# Patient Record
Sex: FEMALE | Race: WHITE | NOT HISPANIC OR LATINO | Employment: OTHER | URBAN - METROPOLITAN AREA
[De-identification: names, ages, dates, MRNs, and addresses within clinical notes are randomized per-mention and may not be internally consistent; named-entity substitution may affect disease eponyms.]

---

## 2023-06-08 ENCOUNTER — OFFICE VISIT (OUTPATIENT)
Dept: PRIMARY CARE | Facility: CLINIC | Age: 73
End: 2023-06-08
Payer: MEDICARE

## 2023-06-08 VITALS
DIASTOLIC BLOOD PRESSURE: 64 MMHG | HEART RATE: 63 BPM | SYSTOLIC BLOOD PRESSURE: 118 MMHG | TEMPERATURE: 96.3 F | BODY MASS INDEX: 28.22 KG/M2 | WEIGHT: 175.6 LBS | OXYGEN SATURATION: 98 % | HEIGHT: 66 IN

## 2023-06-08 DIAGNOSIS — L43.9 LICHEN PLANUS: ICD-10-CM

## 2023-06-08 DIAGNOSIS — F51.01 PRIMARY INSOMNIA: ICD-10-CM

## 2023-06-08 DIAGNOSIS — Z00.00 ROUTINE GENERAL MEDICAL EXAMINATION AT HEALTH CARE FACILITY: Primary | ICD-10-CM

## 2023-06-08 DIAGNOSIS — R39.9 UTI SYMPTOMS: ICD-10-CM

## 2023-06-08 DIAGNOSIS — Z79.899 CONTROLLED SUBSTANCE AGREEMENT SIGNED: ICD-10-CM

## 2023-06-08 LAB
APPEARANCE UR: ABNORMAL
BILIRUB UR QL STRIP: NEGATIVE
COLOR UR: YELLOW
GLUCOSE UR STRIP-MCNC: NEGATIVE MG/DL
HGB UR QL STRIP: ABNORMAL
KETONES UR STRIP-MCNC: NEGATIVE MG/DL
LEUKOCYTE ESTERASE UR QL STRIP: ABNORMAL
NITRITE UR QL STRIP: NEGATIVE
PH UR STRIP: 6 [PH]
PROT UR STRIP-MCNC: ABNORMAL MG/DL
SP GR UR STRIP.AUTO: >=1.03
UROBILINOGEN UR STRIP-ACNC: 0.2 E.U./DL

## 2023-06-08 PROCEDURE — 80346 BENZODIAZEPINES1-12: CPT

## 2023-06-08 PROCEDURE — 80373 DRUG SCREENING TRAMADOL: CPT

## 2023-06-08 PROCEDURE — 99214 OFFICE O/P EST MOD 30 MIN: CPT | Performed by: STUDENT IN AN ORGANIZED HEALTH CARE EDUCATION/TRAINING PROGRAM

## 2023-06-08 PROCEDURE — 80361 OPIATES 1 OR MORE: CPT

## 2023-06-08 PROCEDURE — 80365 DRUG SCREENING OXYCODONE: CPT

## 2023-06-08 PROCEDURE — 80354 DRUG SCREENING FENTANYL: CPT

## 2023-06-08 PROCEDURE — 80368 SEDATIVE HYPNOTICS: CPT

## 2023-06-08 PROCEDURE — 81003 URINALYSIS AUTO W/O SCOPE: CPT | Performed by: STUDENT IN AN ORGANIZED HEALTH CARE EDUCATION/TRAINING PROGRAM

## 2023-06-08 PROCEDURE — 80307 DRUG TEST PRSMV CHEM ANLYZR: CPT

## 2023-06-08 PROCEDURE — 1036F TOBACCO NON-USER: CPT | Performed by: STUDENT IN AN ORGANIZED HEALTH CARE EDUCATION/TRAINING PROGRAM

## 2023-06-08 PROCEDURE — 87086 URINE CULTURE/COLONY COUNT: CPT

## 2023-06-08 PROCEDURE — 87186 SC STD MICRODIL/AGAR DIL: CPT

## 2023-06-08 PROCEDURE — 1159F MED LIST DOCD IN RCRD: CPT | Performed by: STUDENT IN AN ORGANIZED HEALTH CARE EDUCATION/TRAINING PROGRAM

## 2023-06-08 PROCEDURE — 80358 DRUG SCREENING METHADONE: CPT

## 2023-06-08 PROCEDURE — 87077 CULTURE AEROBIC IDENTIFY: CPT

## 2023-06-08 PROCEDURE — 1170F FXNL STATUS ASSESSED: CPT | Performed by: STUDENT IN AN ORGANIZED HEALTH CARE EDUCATION/TRAINING PROGRAM

## 2023-06-08 RX ORDER — LEVOTHYROXINE SODIUM 137 UG/1
137 TABLET ORAL
COMMUNITY
Start: 2021-05-21 | End: 2023-08-14

## 2023-06-08 RX ORDER — CALCIUM ACETATE 667 MG/1
1334 TABLET ORAL DAILY
COMMUNITY
End: 2023-10-11 | Stop reason: SDUPTHER

## 2023-06-08 RX ORDER — NITROFURANTOIN 25; 75 MG/1; MG/1
100 CAPSULE ORAL 2 TIMES DAILY
Qty: 10 CAPSULE | Refills: 0 | Status: SHIPPED | OUTPATIENT
Start: 2023-06-08 | End: 2023-06-19 | Stop reason: SDUPTHER

## 2023-06-08 RX ORDER — ZOLPIDEM TARTRATE 10 MG/1
1 TABLET ORAL NIGHTLY
COMMUNITY
Start: 2014-04-24 | End: 2023-08-07

## 2023-06-08 RX ORDER — ASPIRIN 81 MG/1
1 TABLET ORAL DAILY
COMMUNITY

## 2023-06-08 RX ORDER — ACETAMINOPHEN 500 MG
50 TABLET ORAL DAILY
COMMUNITY

## 2023-06-08 RX ORDER — NAPROXEN 500 MG/1
1 TABLET ORAL 2 TIMES DAILY PRN
COMMUNITY
Start: 2015-06-02 | End: 2023-10-11 | Stop reason: ALTCHOICE

## 2023-06-08 RX ORDER — UMECLIDINIUM 62.5 UG/1
1 AEROSOL, POWDER ORAL DAILY
COMMUNITY
Start: 2020-01-07 | End: 2023-08-14 | Stop reason: SDUPTHER

## 2023-06-08 RX ORDER — ELECTROLYTES/DEXTROSE
2 SOLUTION, ORAL ORAL DAILY
COMMUNITY
End: 2023-10-11 | Stop reason: DRUGHIGH

## 2023-06-08 RX ORDER — EPLERENONE 25 MG/1
1 TABLET, FILM COATED ORAL DAILY
COMMUNITY
Start: 2022-05-23 | End: 2024-02-03

## 2023-06-08 RX ORDER — BISACODYL 5 MG/1
1 TABLET, COATED ORAL DAILY
COMMUNITY
Start: 2018-05-25

## 2023-06-08 RX ORDER — HYDROCODONE BITARTRATE AND ACETAMINOPHEN 5; 325 MG/1; MG/1
1 TABLET ORAL EVERY 6 HOURS PRN
COMMUNITY
End: 2023-10-11 | Stop reason: SDUPTHER

## 2023-06-08 RX ORDER — CHLORHEXIDINE GLUCONATE ORAL RINSE 1.2 MG/ML
SOLUTION DENTAL
COMMUNITY
Start: 2022-11-29 | End: 2023-12-01 | Stop reason: ALTCHOICE

## 2023-06-08 RX ORDER — ZINC GLUCONATE 50 MG
50 TABLET ORAL DAILY
COMMUNITY

## 2023-06-08 RX ORDER — DEXAMETHASONE 0.5 MG/5ML
ELIXIR ORAL
COMMUNITY
Start: 2021-09-08

## 2023-06-08 RX ORDER — HYDROXYCHLOROQUINE SULFATE 200 MG/1
2 TABLET, FILM COATED ORAL DAILY
COMMUNITY

## 2023-06-08 RX ORDER — ALBUTEROL SULFATE 90 UG/1
2 AEROSOL, METERED RESPIRATORY (INHALATION) EVERY 4 HOURS PRN
COMMUNITY
Start: 2019-12-03

## 2023-06-08 RX ORDER — NORTRIPTYLINE HYDROCHLORIDE 25 MG/1
50 CAPSULE ORAL DAILY
COMMUNITY
End: 2023-08-14

## 2023-06-08 RX ORDER — LANSOPRAZOLE 30 MG/1
30 CAPSULE, DELAYED RELEASE ORAL
COMMUNITY
Start: 2017-11-03 | End: 2023-08-24

## 2023-06-08 RX ORDER — BIOTIN 10 MG
2 TABLET ORAL DAILY
COMMUNITY

## 2023-06-08 RX ORDER — ATORVASTATIN CALCIUM 20 MG/1
1 TABLET, FILM COATED ORAL DAILY
COMMUNITY
Start: 2016-08-17 | End: 2023-12-15

## 2023-06-08 ASSESSMENT — ACTIVITIES OF DAILY LIVING (ADL)
TAKING_MEDICATION: INDEPENDENT
DOING_HOUSEWORK: INDEPENDENT
DRESSING: INDEPENDENT
BATHING: INDEPENDENT
GROCERY_SHOPPING: INDEPENDENT
MANAGING_FINANCES: INDEPENDENT

## 2023-06-08 ASSESSMENT — PATIENT HEALTH QUESTIONNAIRE - PHQ9
2. FEELING DOWN, DEPRESSED OR HOPELESS: NOT AT ALL
1. LITTLE INTEREST OR PLEASURE IN DOING THINGS: NOT AT ALL
SUM OF ALL RESPONSES TO PHQ9 QUESTIONS 1 AND 2: 0

## 2023-06-08 ASSESSMENT — ENCOUNTER SYMPTOMS
OCCASIONAL FEELINGS OF UNSTEADINESS: 0
DEPRESSION: 0
LOSS OF SENSATION IN FEET: 0

## 2023-06-08 NOTE — PROGRESS NOTES
Subjective   Reason for Visit: Galina Chao is an 73 y.o. female here for a Medicare Wellness visit.          Reviewed all medications by prescribing practitioner or clinical pharmacist (such as prescriptions, OTCs, herbal therapies and supplements) and documented in the medical record.    HPI      Primary insomnia Long history of this, on Ambien doing well medical floor every 6 months.    UTI type symptoms with burning urination.  I will check a urine today.    COPD does follow with pulmonology doing well very mild case.  Patient Care Team:  Juvencio Gonzalez DO as PCP - General (Internal Medicine)  Blaine Moctezuma MD as PCP - [facility] Attributed Provider     Review of Systems   All other systems reviewed and are negative.  OARRS:  Juvencio Gonzalez DO on 6/8/2023 11:33 AM  I have personally reviewed the OARRS report for Galina Chao. I have considered the risks of abuse, dependence, addiction and diversion    Is the patient prescribed a combination of a benzodiazepine and opioid?  No    Last Urine Drug Screen / ordered today: Yes  Recent Results (from the past 95458 hour(s))   Drug Screen, Urine With Reflex to Confirmation    Collection Time: 11/08/22  2:51 PM   Result Value Ref Range    DRUG SCREEN COMMENT URINE SEE BELOW     Amphetamine Screen, Urine PRESUMPTIVE NEGATIVE NEGATIVE    Barbiturate Screen, Urine PRESUMPTIVE NEGATIVE NEGATIVE    BENZODIAZEPINE (PRESENCE) IN URINE BY SCREEN METHOD PRESUMPTIVE NEGATIVE NEGATIVE    Cannabinoid Screen, Urine PRESUMPTIVE NEGATIVE NEGATIVE    Cocaine Screen, Urine PRESUMPTIVE NEGATIVE NEGATIVE    Fentanyl, Ur PRESUMPTIVE NEGATIVE NEGATIVE    Methadone Screen, Urine PRESUMPTIVE NEGATIVE NEGATIVE    Opiate Screen, Urine PRESUMPTIVE NEGATIVE NEGATIVE    Oxycodone Screen, Ur PRESUMPTIVE NEGATIVE NEGATIVE    PCP Screen, Urine PRESUMPTIVE NEGATIVE NEGATIVE   Zolpidem Confirmation, Urine    Collection Time: 11/08/22  2:51 PM   Result Value Ref Range    Zolpidem 36 (A) Cutoff <25 ng/mL     "Zolpidem Metabolite (ZCA) >1000 (A) Cutoff <25 ng/mL     Results are as expected.     Controlled Substance Agreement:  Date of the Last Agreement: 6/8/2023  Reviewed Controlled Substance Agreement including but not limited to the benefits, risks, and alternatives to treatment with a Controlled Substance medication(s).    Sleep Aids:   What is the patient's goal of therapy? Sleep   Is this being achieved with current treatment? yes    Activities of Daily Living:   Is your overall impression that this patient is benefiting (symptom reduction outweighs side effects) from sleep aid therapy? Yes     1. Physical Functioning: Better  2. Family Relationship: Better  3. Social Relationship: Better  4. Mood: Better  5. Sleep Patterns: Better  6. Overall Function: Better      Objective   Vitals:  /64 (BP Location: Left arm, Patient Position: Sitting)   Pulse 63   Temp 35.7 °C (96.3 °F) (Temporal)   Ht 1.676 m (5' 6\")   Wt 79.7 kg (175 lb 9.6 oz)   SpO2 98%   BMI 28.34 kg/m²       Physical Exam  Constitutional:       Appearance: Normal appearance.   HENT:      Head: Normocephalic and atraumatic.      Right Ear: Tympanic membrane and ear canal normal.      Left Ear: Tympanic membrane and ear canal normal.      Mouth/Throat:      Mouth: Mucous membranes are moist.      Pharynx: Oropharynx is clear.   Eyes:      Extraocular Movements: Extraocular movements intact.      Conjunctiva/sclera: Conjunctivae normal.      Pupils: Pupils are equal, round, and reactive to light.   Cardiovascular:      Rate and Rhythm: Normal rate and regular rhythm.      Pulses: Normal pulses.      Heart sounds: Normal heart sounds.   Pulmonary:      Effort: Pulmonary effort is normal.      Breath sounds: Normal breath sounds.   Abdominal:      General: Abdomen is flat. Bowel sounds are normal.      Palpations: Abdomen is soft.   Musculoskeletal:         General: Normal range of motion.      Cervical back: Normal range of motion and neck supple. "   Skin:     General: Skin is warm and dry.      Capillary Refill: Capillary refill takes 2 to 3 seconds.   Neurological:      General: No focal deficit present.      Mental Status: She is alert and oriented to person, place, and time. Mental status is at baseline.   Psychiatric:         Mood and Affect: Mood normal.         Behavior: Behavior normal.         Thought Content: Thought content normal.         Judgment: Judgment normal.         Assessment/Plan   1. Routine general medical examination at health care facility  Recent labs were all reviewed with her.  - 1 Year Follow Up In Primary Care - Wellness Exam; Future    2. Controlled substance agreement signed    - Opiate/Opioid/Benzo Extended Prescription Compliance    3. UTI symptoms  Sent Macrobid and urine culture.  - POCT UA (Automated) docked device  - Urine Culture    4. Primary insomnia  OARRS reviewed UDS today CSA today.  For as needed.    5. Lichen planus  History of this.  Currently on Plaquenil steroids and pain meds as needed.

## 2023-06-10 LAB — URINE CULTURE: ABNORMAL

## 2023-06-12 ENCOUNTER — TELEPHONE (OUTPATIENT)
Dept: PRIMARY CARE | Facility: CLINIC | Age: 73
End: 2023-06-12
Payer: MEDICARE

## 2023-06-12 LAB
6-ACETYLMORPHINE: <25 NG/ML
7-AMINOCLONAZEPAM: <25 NG/ML
ALPHA-HYDROXYALPRAZOLAM: <25 NG/ML
ALPHA-HYDROXYMIDAZOLAM: <25 NG/ML
ALPRAZOLAM: <25 NG/ML
AMPHETAMINE (PRESENCE) IN URINE BY SCREEN METHOD: ABNORMAL
BARBITURATES PRESENCE IN URINE BY SCREEN METHOD: ABNORMAL
CANNABINOIDS IN URINE BY SCREEN METHOD: ABNORMAL
CHLORDIAZEPOXIDE: <25 NG/ML
CLONAZEPAM: <25 NG/ML
COCAINE (PRESENCE) IN URINE BY SCREEN METHOD: ABNORMAL
CODEINE: <50 NG/ML
CREATINE, URINE FOR DRUG: 114.1 MG/DL
DIAZEPAM: <25 NG/ML
DRUG SCREEN COMMENT URINE: ABNORMAL
EDDP: <25 NG/ML
FENTANYL CONFIRMATION, URINE: <2.5 NG/ML
HYDROCODONE: <25 NG/ML
HYDROMORPHONE: <25 NG/ML
LORAZEPAM: <25 NG/ML
METHADONE CONFIRMATION,URINE: <25 NG/ML
MIDAZOLAM: <25 NG/ML
MORPHINE URINE: <50 NG/ML
NORDIAZEPAM: <25 NG/ML
NORFENTANYL: <2.5 NG/ML
NORHYDROCODONE: <25 NG/ML
NOROXYCODONE: <25 NG/ML
O-DESMETHYLTRAMADOL: <50 NG/ML
OXAZEPAM: <25 NG/ML
OXYCODONE: <25 NG/ML
OXYMORPHONE: <25 NG/ML
PHENCYCLIDINE (PRESENCE) IN URINE BY SCREEN METHOD: ABNORMAL
TEMAZEPAM: <25 NG/ML
TRAMADOL: <50 NG/ML
ZOLPIDEM METABOLITE (ZCA): >1000 NG/ML
ZOLPIDEM: <25 NG/ML

## 2023-06-12 NOTE — TELEPHONE ENCOUNTER
----- Message from Juvencio Gonzalez DO sent at 6/12/2023  9:04 AM EDT -----  UC grew E. Coli, abx should take care of it

## 2023-06-19 DIAGNOSIS — R39.9 UTI SYMPTOMS: ICD-10-CM

## 2023-06-19 RX ORDER — NITROFURANTOIN 25; 75 MG/1; MG/1
100 CAPSULE ORAL 2 TIMES DAILY
Qty: 10 CAPSULE | Refills: 0 | Status: SHIPPED | OUTPATIENT
Start: 2023-06-19 | End: 2023-06-24

## 2023-08-04 DIAGNOSIS — I10 HYPERTENSION, UNSPECIFIED TYPE: Primary | ICD-10-CM

## 2023-08-04 DIAGNOSIS — F51.01 PRIMARY INSOMNIA: Primary | ICD-10-CM

## 2023-08-07 DIAGNOSIS — F51.01 PRIMARY INSOMNIA: ICD-10-CM

## 2023-08-07 RX ORDER — ZOLPIDEM TARTRATE 10 MG/1
TABLET ORAL NIGHTLY
Qty: 90 TABLET | Refills: 1 | Status: SHIPPED | OUTPATIENT
Start: 2023-08-07 | End: 2023-08-07 | Stop reason: SDUPTHER

## 2023-08-07 RX ORDER — ATENOLOL 25 MG/1
25 TABLET ORAL DAILY
Qty: 90 TABLET | Refills: 3 | Status: SHIPPED | OUTPATIENT
Start: 2023-08-07 | End: 2024-05-14 | Stop reason: SDUPTHER

## 2023-08-07 RX ORDER — ZOLPIDEM TARTRATE 10 MG/1
10 TABLET ORAL NIGHTLY
Qty: 90 TABLET | Refills: 1 | Status: SHIPPED | OUTPATIENT
Start: 2023-08-07 | End: 2023-10-11 | Stop reason: SDUPTHER

## 2023-08-07 NOTE — TELEPHONE ENCOUNTER
Patient called office and states needs a 90 refill of [facility]em [facility], states Dr. Moctezuma previously has always did this for patient. patient will be out of medication by 08/24/23.

## 2023-08-12 DIAGNOSIS — E03.9 HYPOTHYROIDISM, UNSPECIFIED TYPE: Primary | ICD-10-CM

## 2023-08-12 DIAGNOSIS — F32.A DEPRESSION, UNSPECIFIED DEPRESSION TYPE: ICD-10-CM

## 2023-08-14 DIAGNOSIS — J43.9 PULMONARY EMPHYSEMA, UNSPECIFIED EMPHYSEMA TYPE (MULTI): Primary | ICD-10-CM

## 2023-08-14 RX ORDER — UMECLIDINIUM 62.5 UG/1
1 AEROSOL, POWDER ORAL DAILY
Qty: 90 CAPSULE | Refills: 3 | Status: SHIPPED | OUTPATIENT
Start: 2023-08-14

## 2023-08-14 RX ORDER — LEVOTHYROXINE SODIUM 137 UG/1
137 TABLET ORAL
Qty: 90 TABLET | Refills: 3 | Status: SHIPPED | OUTPATIENT
Start: 2023-08-14 | End: 2023-08-15 | Stop reason: SDUPTHER

## 2023-08-14 RX ORDER — NORTRIPTYLINE HYDROCHLORIDE 25 MG/1
50 CAPSULE ORAL DAILY
Qty: 90 CAPSULE | Refills: 3 | Status: SHIPPED | OUTPATIENT
Start: 2023-08-14 | End: 2023-08-15 | Stop reason: SDUPTHER

## 2023-08-15 DIAGNOSIS — E03.9 HYPOTHYROIDISM, UNSPECIFIED TYPE: ICD-10-CM

## 2023-08-15 DIAGNOSIS — F32.A DEPRESSION, UNSPECIFIED DEPRESSION TYPE: ICD-10-CM

## 2023-08-16 PROBLEM — G89.28 CHRONIC NECK PAIN WITH HISTORY OF CERVICAL SPINAL SURGERY: Status: ACTIVE | Noted: 2023-08-16

## 2023-08-16 PROBLEM — I83.93 VARICOSE VEINS OF LEGS: Status: ACTIVE | Noted: 2023-08-16

## 2023-08-16 PROBLEM — G89.29 CHRONIC RIGHT-SIDED LOW BACK PAIN WITHOUT SCIATICA: Status: ACTIVE | Noted: 2023-08-16

## 2023-08-16 PROBLEM — R41.3 MEMORY CHANGE: Status: ACTIVE | Noted: 2023-08-16

## 2023-08-16 PROBLEM — R68.2 DRY MOUTH: Status: ACTIVE | Noted: 2023-08-16

## 2023-08-16 PROBLEM — M70.62 TROCHANTERIC BURSITIS OF BOTH HIPS: Status: ACTIVE | Noted: 2023-08-16

## 2023-08-16 PROBLEM — M79.2 NEURALGIA: Status: ACTIVE | Noted: 2023-08-16

## 2023-08-16 PROBLEM — G43.909 MIGRAINES: Status: ACTIVE | Noted: 2023-08-16

## 2023-08-16 PROBLEM — M25.561 BILATERAL KNEE PAIN: Status: ACTIVE | Noted: 2023-08-16

## 2023-08-16 PROBLEM — M35.00 SICCA SYNDROME (MULTI): Status: ACTIVE | Noted: 2023-08-16

## 2023-08-16 PROBLEM — M54.6 THORACIC BACK PAIN: Status: ACTIVE | Noted: 2023-08-16

## 2023-08-16 PROBLEM — R10.9 ABDOMINAL PAIN: Status: ACTIVE | Noted: 2023-08-16

## 2023-08-16 PROBLEM — E78.5 HYPERLIPIDEMIA: Status: ACTIVE | Noted: 2023-08-16

## 2023-08-16 PROBLEM — K12.1 ORAL ULCER: Status: ACTIVE | Noted: 2023-08-16

## 2023-08-16 PROBLEM — M48.02 DEGENERATIVE CERVICAL SPINAL STENOSIS: Status: ACTIVE | Noted: 2023-08-16

## 2023-08-16 PROBLEM — R42 VERTIGO: Status: ACTIVE | Noted: 2023-08-16

## 2023-08-16 PROBLEM — J31.0 CHRONIC RHINITIS: Status: ACTIVE | Noted: 2023-08-16

## 2023-08-16 PROBLEM — R07.89 CHEST TIGHTNESS: Status: ACTIVE | Noted: 2023-08-16

## 2023-08-16 PROBLEM — Z96.652 PRESENCE OF TOTAL LEFT KNEE JOINT PROSTHESIS: Status: ACTIVE | Noted: 2023-08-16

## 2023-08-16 PROBLEM — R22.1 NECK MASS: Status: ACTIVE | Noted: 2023-08-16

## 2023-08-16 PROBLEM — M25.562 BILATERAL KNEE PAIN: Status: ACTIVE | Noted: 2023-08-16

## 2023-08-16 PROBLEM — H81.10 BENIGN PAROXYSMAL POSITIONAL VERTIGO: Status: ACTIVE | Noted: 2023-08-16

## 2023-08-16 PROBLEM — R27.0 ATAXIA: Status: ACTIVE | Noted: 2023-08-16

## 2023-08-16 PROBLEM — M54.12 RIGHT CERVICAL RADICULOPATHY: Status: ACTIVE | Noted: 2023-08-16

## 2023-08-16 PROBLEM — M17.11 ARTHRITIS OF KNEE, RIGHT: Status: ACTIVE | Noted: 2023-08-16

## 2023-08-16 PROBLEM — M54.2 CHRONIC NECK PAIN WITH HISTORY OF CERVICAL SPINAL SURGERY: Status: ACTIVE | Noted: 2023-08-16

## 2023-08-16 PROBLEM — N90.4 LICHEN SCLEROSUS OF FEMALE GENITALIA: Status: ACTIVE | Noted: 2023-08-16

## 2023-08-16 PROBLEM — M81.0 MENOPAUSAL OSTEOPOROSIS: Status: ACTIVE | Noted: 2023-08-16

## 2023-08-16 PROBLEM — Z95.5 PRESENCE OF STENT IN CORONARY ARTERY: Status: ACTIVE | Noted: 2023-08-16

## 2023-08-16 PROBLEM — H90.3 BILATERAL HIGH FREQUENCY SENSORINEURAL HEARING LOSS: Status: ACTIVE | Noted: 2023-08-16

## 2023-08-16 PROBLEM — H04.129 DRY EYE: Status: ACTIVE | Noted: 2023-08-16

## 2023-08-16 PROBLEM — M54.50 CHRONIC RIGHT-SIDED LOW BACK PAIN WITHOUT SCIATICA: Status: ACTIVE | Noted: 2023-08-16

## 2023-08-16 PROBLEM — I25.10 CORONARY ARTERY DISEASE: Status: ACTIVE | Noted: 2023-08-16

## 2023-08-16 PROBLEM — Z98.890 CHRONIC NECK PAIN WITH HISTORY OF CERVICAL SPINAL SURGERY: Status: ACTIVE | Noted: 2023-08-16

## 2023-08-16 PROBLEM — G99.2 MYELOPATHY CONCURRENT WITH AND DUE TO SPINAL STENOSIS OF CERVICAL REGION (MULTI): Status: ACTIVE | Noted: 2023-08-16

## 2023-08-16 PROBLEM — M17.12 ARTHRITIS OF KNEE, LEFT: Status: ACTIVE | Noted: 2023-08-16

## 2023-08-16 PROBLEM — M70.61 TROCHANTERIC BURSITIS OF BOTH HIPS: Status: ACTIVE | Noted: 2023-08-16

## 2023-08-16 PROBLEM — M48.02 MYELOPATHY CONCURRENT WITH AND DUE TO SPINAL STENOSIS OF CERVICAL REGION (MULTI): Status: ACTIVE | Noted: 2023-08-16

## 2023-08-16 PROBLEM — G56.20 ULNAR NEUROPATHY: Status: ACTIVE | Noted: 2023-08-16

## 2023-08-16 RX ORDER — ATORVASTATIN CALCIUM 20 MG/1
20 TABLET, FILM COATED ORAL DAILY
COMMUNITY
Start: 2017-06-01 | End: 2023-10-11 | Stop reason: DRUGHIGH

## 2023-08-16 RX ORDER — NORTRIPTYLINE HYDROCHLORIDE 25 MG/1
50 CAPSULE ORAL DAILY
Qty: 180 CAPSULE | Refills: 3 | Status: SHIPPED | OUTPATIENT
Start: 2023-08-16 | End: 2024-06-10 | Stop reason: SDUPTHER

## 2023-08-16 RX ORDER — NAPROXEN SODIUM 500 MG/1
TABLET, FILM COATED, EXTENDED RELEASE ORAL
COMMUNITY
End: 2023-10-11 | Stop reason: SDUPTHER

## 2023-08-16 RX ORDER — HYDROCODONE BITARTRATE AND ACETAMINOPHEN 5; 325 MG/1; MG/1
1 TABLET ORAL 2 TIMES DAILY PRN
COMMUNITY
End: 2023-10-11 | Stop reason: SDUPTHER

## 2023-08-16 RX ORDER — AMLODIPINE BESYLATE 5 MG/1
TABLET ORAL
COMMUNITY
Start: 2017-06-01 | End: 2023-10-11 | Stop reason: CLARIF

## 2023-08-16 RX ORDER — DESONIDE 0.5 MG/G
OINTMENT TOPICAL 2 TIMES DAILY
COMMUNITY
Start: 2023-08-03 | End: 2023-10-11 | Stop reason: ALTCHOICE

## 2023-08-16 RX ORDER — BIOTIN 1 MG
TABLET ORAL
COMMUNITY
End: 2023-10-11 | Stop reason: DRUGHIGH

## 2023-08-16 RX ORDER — HYDROCODONE BITARTRATE AND ACETAMINOPHEN 5; 300 MG/1; MG/1
1 TABLET ORAL AS NEEDED
COMMUNITY
Start: 2020-06-29 | End: 2023-10-11 | Stop reason: CLARIF

## 2023-08-16 RX ORDER — CLOTRIMAZOLE AND BETAMETHASONE DIPROPIONATE 10; .64 MG/G; MG/G
1 CREAM TOPICAL 2 TIMES DAILY
COMMUNITY
Start: 2020-06-29 | End: 2023-10-11 | Stop reason: ALTCHOICE

## 2023-08-16 RX ORDER — PREDNISOLONE ACETATE 10 MG/ML
1 SUSPENSION/ DROPS OPHTHALMIC
COMMUNITY
Start: 2023-07-31 | End: 2023-10-11 | Stop reason: ALTCHOICE

## 2023-08-16 RX ORDER — LEVOTHYROXINE SODIUM 137 UG/1
1 TABLET ORAL DAILY
COMMUNITY
Start: 2021-05-21 | End: 2023-10-11 | Stop reason: SDUPTHER

## 2023-08-16 RX ORDER — LEVOTHYROXINE SODIUM 137 UG/1
TABLET ORAL
Qty: 90 TABLET | Refills: 3 | Status: SHIPPED | OUTPATIENT
Start: 2023-08-16 | End: 2024-01-23

## 2023-08-16 RX ORDER — LEVOTHYROXINE SODIUM 175 UG/1
TABLET ORAL
COMMUNITY
End: 2023-10-11 | Stop reason: DRUGHIGH

## 2023-08-16 RX ORDER — NORTRIPTYLINE HYDROCHLORIDE 25 MG/1
1 CAPSULE ORAL 3 TIMES DAILY
COMMUNITY
Start: 2019-03-01 | End: 2023-10-11 | Stop reason: DRUGHIGH

## 2023-08-16 RX ORDER — MINOXIDIL 2 %
SOLUTION, NON-ORAL TOPICAL NIGHTLY
COMMUNITY
End: 2024-06-10 | Stop reason: ALTCHOICE

## 2023-08-24 DIAGNOSIS — K21.9 GASTROESOPHAGEAL REFLUX DISEASE WITHOUT ESOPHAGITIS: Primary | ICD-10-CM

## 2023-08-24 PROBLEM — Z96.651 STATUS POST TOTAL RIGHT KNEE REPLACEMENT: Status: ACTIVE | Noted: 2023-08-24

## 2023-08-24 PROBLEM — M25.552 PAIN OF BOTH HIP JOINTS: Status: ACTIVE | Noted: 2023-08-24

## 2023-08-24 PROBLEM — M25.551 PAIN OF BOTH HIP JOINTS: Status: ACTIVE | Noted: 2023-08-24

## 2023-08-24 PROBLEM — M79.605 PAIN IN BOTH LOWER EXTREMITIES: Status: ACTIVE | Noted: 2023-08-24

## 2023-08-24 PROBLEM — M79.604 PAIN IN BOTH LOWER EXTREMITIES: Status: ACTIVE | Noted: 2023-08-24

## 2023-08-24 PROBLEM — M54.9 BACK PAIN WITH HISTORY OF SPINAL SURGERY: Status: ACTIVE | Noted: 2023-08-24

## 2023-08-24 PROBLEM — Z98.890 BACK PAIN WITH HISTORY OF SPINAL SURGERY: Status: ACTIVE | Noted: 2023-08-24

## 2023-08-24 RX ORDER — LANSOPRAZOLE 30 MG/1
30 CAPSULE, DELAYED RELEASE ORAL EVERY MORNING
Qty: 90 CAPSULE | Refills: 3 | Status: SHIPPED | OUTPATIENT
Start: 2023-08-24 | End: 2024-05-21

## 2023-08-24 RX ORDER — CLOBETASOL PROPIONATE 0.5 MG/G
CREAM TOPICAL
COMMUNITY
Start: 2023-08-23 | End: 2023-10-11 | Stop reason: ALTCHOICE

## 2023-09-14 ENCOUNTER — TELEPHONE (OUTPATIENT)
Dept: PRIMARY CARE | Facility: CLINIC | Age: 73
End: 2023-09-14
Payer: MEDICARE

## 2023-09-14 DIAGNOSIS — N39.0 URINARY TRACT INFECTION WITHOUT HEMATURIA, SITE UNSPECIFIED: ICD-10-CM

## 2023-09-14 DIAGNOSIS — J06.9 UPPER RESPIRATORY TRACT INFECTION, UNSPECIFIED TYPE: Primary | ICD-10-CM

## 2023-09-14 RX ORDER — AZITHROMYCIN 250 MG/1
TABLET, FILM COATED ORAL
Qty: 6 TABLET | Refills: 0 | Status: SHIPPED | OUTPATIENT
Start: 2023-09-14 | End: 2023-11-02

## 2023-09-14 RX ORDER — NITROFURANTOIN 25; 75 MG/1; MG/1
100 CAPSULE ORAL 2 TIMES DAILY
Qty: 10 CAPSULE | Refills: 0 | Status: SHIPPED | OUTPATIENT
Start: 2023-09-14 | End: 2023-09-19

## 2023-09-14 NOTE — TELEPHONE ENCOUNTER
Pt called saying she has a uti, asking for antibiotics to be sent to [facility] pharmacy.

## 2023-09-14 NOTE — PROGRESS NOTES
Subjective   Reason for Visit: Galina Caho is an 73 y.o. female here for a Medicare Wellness visit.               HPI    Patient Care Team:  Juvencio Gonzalez DO as PCP - General (Internal Medicine)  Blaine Moctezuma MD as PCP - [facility]     Review of Systems    Objective   Vitals:  There were no vitals taken for this visit.      Physical Exam    Assessment/Plan   Problem List Items Addressed This Visit    None

## 2023-09-21 ENCOUNTER — HOSPITAL ENCOUNTER (OUTPATIENT)
Dept: DATA CONVERSION | Facility: HOSPITAL | Age: 73
Discharge: HOME | End: 2023-09-21
Attending: ANESTHESIOLOGY | Admitting: ANESTHESIOLOGY
Payer: MEDICARE

## 2023-09-21 DIAGNOSIS — M47.812 SPONDYLOSIS WITHOUT MYELOPATHY OR RADICULOPATHY, CERVICAL REGION: ICD-10-CM

## 2023-09-21 DIAGNOSIS — Z53.9 PROCEDURE AND TREATMENT NOT CARRIED OUT, UNSPECIFIED REASON: ICD-10-CM

## 2023-10-04 ENCOUNTER — APPOINTMENT (OUTPATIENT)
Dept: CARDIOLOGY | Facility: CLINIC | Age: 73
End: 2023-10-04
Payer: MEDICARE

## 2023-10-05 ENCOUNTER — APPOINTMENT (OUTPATIENT)
Dept: PAIN MEDICINE | Facility: CLINIC | Age: 73
End: 2023-10-05
Payer: MEDICARE

## 2023-10-11 ENCOUNTER — OFFICE VISIT (OUTPATIENT)
Dept: PRIMARY CARE | Facility: CLINIC | Age: 73
End: 2023-10-11
Payer: MEDICARE

## 2023-10-11 VITALS
WEIGHT: 182.8 LBS | SYSTOLIC BLOOD PRESSURE: 132 MMHG | DIASTOLIC BLOOD PRESSURE: 80 MMHG | BODY MASS INDEX: 29.38 KG/M2 | HEART RATE: 70 BPM | HEIGHT: 66 IN | OXYGEN SATURATION: 99 %

## 2023-10-11 DIAGNOSIS — G89.29 CHRONIC BILATERAL LOW BACK PAIN WITHOUT SCIATICA: Primary | ICD-10-CM

## 2023-10-11 DIAGNOSIS — F51.01 PRIMARY INSOMNIA: ICD-10-CM

## 2023-10-11 DIAGNOSIS — M54.50 CHRONIC BILATERAL LOW BACK PAIN WITHOUT SCIATICA: Primary | ICD-10-CM

## 2023-10-11 PROCEDURE — 1159F MED LIST DOCD IN RCRD: CPT | Performed by: STUDENT IN AN ORGANIZED HEALTH CARE EDUCATION/TRAINING PROGRAM

## 2023-10-11 PROCEDURE — 3075F SYST BP GE 130 - 139MM HG: CPT | Performed by: STUDENT IN AN ORGANIZED HEALTH CARE EDUCATION/TRAINING PROGRAM

## 2023-10-11 PROCEDURE — 1036F TOBACCO NON-USER: CPT | Performed by: STUDENT IN AN ORGANIZED HEALTH CARE EDUCATION/TRAINING PROGRAM

## 2023-10-11 PROCEDURE — 3079F DIAST BP 80-89 MM HG: CPT | Performed by: STUDENT IN AN ORGANIZED HEALTH CARE EDUCATION/TRAINING PROGRAM

## 2023-10-11 PROCEDURE — 99214 OFFICE O/P EST MOD 30 MIN: CPT | Performed by: STUDENT IN AN ORGANIZED HEALTH CARE EDUCATION/TRAINING PROGRAM

## 2023-10-11 PROCEDURE — 1125F AMNT PAIN NOTED PAIN PRSNT: CPT | Performed by: STUDENT IN AN ORGANIZED HEALTH CARE EDUCATION/TRAINING PROGRAM

## 2023-10-11 RX ORDER — HYDROCODONE BITARTRATE AND ACETAMINOPHEN 5; 325 MG/1; MG/1
1 TABLET ORAL 2 TIMES DAILY
Qty: 30 TABLET | Refills: 0 | Status: SHIPPED | OUTPATIENT
Start: 2023-10-11 | End: 2023-10-26

## 2023-10-11 RX ORDER — ZOLPIDEM TARTRATE 10 MG/1
10 TABLET ORAL NIGHTLY
Qty: 90 TABLET | Refills: 1 | Status: SHIPPED | OUTPATIENT
Start: 2023-10-11 | End: 2024-06-06

## 2023-10-11 NOTE — PROGRESS NOTES
Subjective   Patient ID: Galina Chao is a 73 y.o. female who presents for Hypertension, Hyperlipidemia, and Insomnia.    HPI     Primary insomnia Long history of this, on Ambien doing well medical floor every 6 months.     Patient has no chronic back issues.  She does take some  patient has 1-2 refills a year just takes only as needed.  From her old doctor.  She has pain management as well for this.     COPD does follow with pulmonology doing well very mild case.     Review of Systems   All other systems reviewed and are negative.  OARRS:  Juvencio Gonzalez DO on 6/8/2023 11:33 AM  I have personally reviewed the OARRS report for Galina Chao. I have considered the risks of abuse, dependence, addiction and diversion     Is the patient prescribed a combination of a benzodiazepine and opioid?  No     Last Urine Drug Screen / ordered today: Yes  Recent Results         Recent Results (from the past 24321 hour(s))   Drug Screen, Urine With Reflex to Confirmation     Collection Time: 11/08/22  2:51 PM   Result Value Ref Range     DRUG SCREEN COMMENT URINE SEE BELOW       Amphetamine Screen, Urine PRESUMPTIVE NEGATIVE NEGATIVE     Barbiturate Screen, Urine PRESUMPTIVE NEGATIVE NEGATIVE     BENZODIAZEPINE (PRESENCE) IN URINE BY SCREEN METHOD PRESUMPTIVE NEGATIVE NEGATIVE     Cannabinoid Screen, Urine PRESUMPTIVE NEGATIVE NEGATIVE     Cocaine Screen, Urine PRESUMPTIVE NEGATIVE NEGATIVE     Fentanyl, Ur PRESUMPTIVE NEGATIVE NEGATIVE     Methadone Screen, Urine PRESUMPTIVE NEGATIVE NEGATIVE     Opiate Screen, Urine PRESUMPTIVE NEGATIVE NEGATIVE     Oxycodone Screen, Ur PRESUMPTIVE NEGATIVE NEGATIVE     PCP Screen, Urine PRESUMPTIVE NEGATIVE NEGATIVE   Zolpidem Confirmation, Urine     Collection Time: 11/08/22  2:51 PM   Result Value Ref Range     Zolpidem 36 (A) Cutoff <25 ng/mL     Zolpidem Metabolite (ZCA) >1000 (A) Cutoff <25 ng/mL         Results are as expected.      Controlled Substance Agreement:  Date of the Last  "Agreement: 6/8/2023  Reviewed Controlled Substance Agreement including but not limited to the benefits, risks, and alternatives to treatment with a Controlled Substance medication(s).     Sleep Aids:   What is the patient's goal of therapy? Sleep   Is this being achieved with current treatment? yes     Activities of Daily Living:   Is your overall impression that this patient is benefiting (symptom reduction outweighs side effects) from sleep aid therapy? Yes      1. Physical Functioning: Better  2. Family Relationship: Better  3. Social Relationship: Better  4. Mood: Better  5. Sleep Patterns: Better  6. Overall Function: Better    Review of Systems   All other systems reviewed and are negative.      Objective   /80 (BP Location: Right arm, Patient Position: Sitting)   Pulse 70   Ht 1.676 m (5' 6\")   Wt 82.9 kg (182 lb 12.8 oz)   SpO2 99%   BMI 29.50 kg/m²     Physical Exam  Constitutional:       Appearance: Normal appearance.   HENT:      Head: Normocephalic and atraumatic.      Right Ear: Tympanic membrane and ear canal normal.      Left Ear: Tympanic membrane and ear canal normal.      Mouth/Throat:      Mouth: Mucous membranes are moist.      Pharynx: Oropharynx is clear.   Eyes:      Extraocular Movements: Extraocular movements intact.      Conjunctiva/sclera: Conjunctivae normal.      Pupils: Pupils are equal, round, and reactive to light.   Cardiovascular:      Rate and Rhythm: Normal rate and regular rhythm.      Pulses: Normal pulses.      Heart sounds: Normal heart sounds.   Pulmonary:      Effort: Pulmonary effort is normal.      Breath sounds: Normal breath sounds.   Abdominal:      General: Abdomen is flat. Bowel sounds are normal.      Palpations: Abdomen is soft.   Musculoskeletal:         General: Normal range of motion.      Cervical back: Normal range of motion and neck supple.   Skin:     General: Skin is warm and dry.      Capillary Refill: Capillary refill takes 2 to 3 seconds. "   Neurological:      General: No focal deficit present.      Mental Status: She is alert and oriented to person, place, and time. Mental status is at baseline.   Psychiatric:         Mood and Affect: Mood normal.         Behavior: Behavior normal.         Thought Content: Thought content normal.         Judgment: Judgment normal.         Assessment/Plan   1. Primary insomnia    - zolpidem (Ambien) 10 mg tablet; Take 1 tablet (10 mg) by mouth once daily at bedtime.  Dispense: 90 tablet; Refill: 1    2. Chronic bilateral low back pain without sciatica  Give refill OARRS reviewed.  To take sparingly.  Or severe as well.  Takes Ambien stable this.  No-show this time.  - HYDROcodone-acetaminophen (Norco) 5-325 mg tablet; Take 1 tablet by mouth 2 times a day for 15 days.  Dispense: 30 tablet; Refill: 0

## 2023-10-23 ENCOUNTER — TELEPHONE (OUTPATIENT)
Dept: PAIN MEDICINE | Facility: CLINIC | Age: 73
End: 2023-10-23
Payer: MEDICARE

## 2023-10-23 NOTE — TELEPHONE ENCOUNTER
Patient called and stated they started using desonide cream, and wanted to know if that was ok, since he is having a procedure Tomorrow

## 2023-10-24 ENCOUNTER — HOSPITAL ENCOUNTER (OUTPATIENT)
Dept: PAIN MEDICINE | Facility: CLINIC | Age: 73
Discharge: HOME | End: 2023-10-24
Payer: MEDICARE

## 2023-10-24 ENCOUNTER — ANCILLARY PROCEDURE (OUTPATIENT)
Dept: RADIOLOGY | Facility: CLINIC | Age: 73
End: 2023-10-24
Payer: MEDICARE

## 2023-10-24 VITALS
BODY MASS INDEX: 29.49 KG/M2 | DIASTOLIC BLOOD PRESSURE: 77 MMHG | SYSTOLIC BLOOD PRESSURE: 157 MMHG | TEMPERATURE: 96.6 F | WEIGHT: 177 LBS | HEIGHT: 65 IN | RESPIRATION RATE: 16 BRPM | HEART RATE: 77 BPM | OXYGEN SATURATION: 96 %

## 2023-10-24 DIAGNOSIS — M47.812 CERVICAL SPONDYLOSIS: ICD-10-CM

## 2023-10-24 DIAGNOSIS — M47.817 FACET ARTHROPATHY, LUMBOSACRAL: ICD-10-CM

## 2023-10-24 DIAGNOSIS — M47.812 CERVICAL SPONDYLOSIS: Primary | ICD-10-CM

## 2023-10-24 PROCEDURE — 77003 FLUOROGUIDE FOR SPINE INJECT: CPT

## 2023-10-24 PROCEDURE — 2500000005 HC RX 250 GENERAL PHARMACY W/O HCPCS

## 2023-10-24 PROCEDURE — 64490 INJ PARAVERT F JNT C/T 1 LEV: CPT | Performed by: ANESTHESIOLOGY

## 2023-10-24 PROCEDURE — 7100000009 HC PHASE TWO TIME - INITIAL BASE CHARGE: Performed by: ANESTHESIOLOGY

## 2023-10-24 PROCEDURE — 7100000010 HC PHASE TWO TIME - EACH INCREMENTAL 1 MINUTE: Performed by: ANESTHESIOLOGY

## 2023-10-24 PROCEDURE — 2500000004 HC RX 250 GENERAL PHARMACY W/ HCPCS (ALT 636 FOR OP/ED)

## 2023-10-24 PROCEDURE — 64491 INJ PARAVERT F JNT C/T 2 LEV: CPT | Performed by: ANESTHESIOLOGY

## 2023-10-24 RX ORDER — TRIAMCINOLONE ACETONIDE 40 MG/ML
INJECTION, SUSPENSION INTRA-ARTICULAR; INTRAMUSCULAR
Status: COMPLETED
Start: 2023-10-24 | End: 2023-10-24

## 2023-10-24 RX ORDER — ROPIVACAINE HYDROCHLORIDE 5 MG/ML
INJECTION, SOLUTION EPIDURAL; INFILTRATION; PERINEURAL
Status: COMPLETED
Start: 2023-10-24 | End: 2023-10-24

## 2023-10-24 RX ORDER — SODIUM CHLORIDE 9 MG/ML
INJECTION, SOLUTION INTRAMUSCULAR; INTRAVENOUS; SUBCUTANEOUS
Status: DISCONTINUED
Start: 2023-10-24 | End: 2023-10-24 | Stop reason: WASHOUT

## 2023-10-24 RX ORDER — DEXAMETHASONE SODIUM PHOSPHATE 10 MG/ML
INJECTION INTRAMUSCULAR; INTRAVENOUS
Status: DISCONTINUED
Start: 2023-10-24 | End: 2023-10-24 | Stop reason: WASHOUT

## 2023-10-24 RX ORDER — LIDOCAINE HYDROCHLORIDE 5 MG/ML
INJECTION, SOLUTION INFILTRATION; INTRAVENOUS
Status: COMPLETED
Start: 2023-10-24 | End: 2023-10-24

## 2023-10-24 RX ADMIN — ROPIVACAINE HYDROCHLORIDE 100 MG: 5 INJECTION, SOLUTION EPIDURAL; INFILTRATION; PERINEURAL at 09:28

## 2023-10-24 RX ADMIN — TRIAMCINOLONE ACETONIDE 40 MG: 40 INJECTION, SUSPENSION INTRA-ARTICULAR; INTRAMUSCULAR at 09:29

## 2023-10-24 RX ADMIN — LIDOCAINE HYDROCHLORIDE 250 MG: 5 INJECTION, SOLUTION INFILTRATION at 09:27

## 2023-10-24 ASSESSMENT — PAIN DESCRIPTION - DESCRIPTORS: DESCRIPTORS: ACHING

## 2023-10-24 ASSESSMENT — COLUMBIA-SUICIDE SEVERITY RATING SCALE - C-SSRS
1. IN THE PAST MONTH, HAVE YOU WISHED YOU WERE DEAD OR WISHED YOU COULD GO TO SLEEP AND NOT WAKE UP?: NO
2. HAVE YOU ACTUALLY HAD ANY THOUGHTS OF KILLING YOURSELF?: NO
6. HAVE YOU EVER DONE ANYTHING, STARTED TO DO ANYTHING, OR PREPARED TO DO ANYTHING TO END YOUR LIFE?: NO

## 2023-10-24 ASSESSMENT — PAIN - FUNCTIONAL ASSESSMENT
PAIN_FUNCTIONAL_ASSESSMENT: 0-10
PAIN_FUNCTIONAL_ASSESSMENT: 0-10

## 2023-10-24 ASSESSMENT — PAIN SCALES - GENERAL
PAINLEVEL_OUTOF10: 7
PAINLEVEL_OUTOF10: 3

## 2023-10-24 NOTE — H&P
History Of Present Illness  Galina Chao is a 73 y.o. female presenting with neck pain.      Past Medical History  Past Medical History:   Diagnosis Date    Allergic     Arthritis     COPD (chronic obstructive pulmonary disease) (CMS/HCC)     Disease of thyroid gland     Diverticulosis of intestine, part unspecified, without perforation or abscess without bleeding     Diverticulosis    Emphysema of lung (CMS/HCC)     GERD (gastroesophageal reflux disease)     Heart murmur     Hypertension     Inflammatory bowel disease     Obesity, unspecified     Adult-onset obesity    Personal history of other diseases of the circulatory system     History of hypertension    Personal history of other diseases of the circulatory system 10/09/2020    History of hypertension    Personal history of other diseases of the circulatory system     History of cardiac murmur    Personal history of other diseases of the digestive system     History of gastroesophageal reflux (GERD)    Personal history of other diseases of the digestive system     History of irritable bowel syndrome    Personal history of other diseases of the musculoskeletal system and connective tissue     History of arthritis    Personal history of other diseases of the musculoskeletal system and connective tissue     History of arthritis    Personal history of other diseases of the musculoskeletal system and connective tissue     History of fibromyalgia    Personal history of other diseases of the respiratory system     History of chronic obstructive lung disease    Personal history of other diseases of the respiratory system     History of pulmonary emphysema    Personal history of other endocrine, nutritional and metabolic disease     History of thyroid disorder       Surgical History  Past Surgical History:   Procedure Laterality Date    APPENDECTOMY  04/27/2018    Appendectomy    BACK SURGERY  10/12/2017    Back Surgery    CARDIAC CATHETERIZATION      CARDIAC SURGERY   "10/12/2017    Heart Surgery    CHOLECYSTECTOMY  05/24/2016    Cholecystectomy    ENDOMETRIAL ABLATION      JOINT REPLACEMENT      OTHER SURGICAL HISTORY  06/15/2022    Knee replacement    OTHER SURGICAL HISTORY  06/15/2022    Endometrial ablation    OTHER SURGICAL HISTORY  04/27/2018    Previous Stent Placement    OTHER SURGICAL HISTORY  04/27/2018    Abdominal Surgery        Social History  She reports that she quit smoking about 27 years ago. Her smoking use included cigarettes. She started smoking about 60 years ago. She has a 60.00 pack-year smoking history. She has never used smokeless tobacco. She reports current alcohol use. She reports that she does not use drugs.    Family History  Family History   Problem Relation Name Age of Onset    Cancer Mother JEFRY GRANT     Hypertension Mother JEFRY GRANT     Cancer Father ISA ALMENDAREZDAMIAN ZHOU     Hypertension Father ISA GRANT      Leukemia Father ISA ALMENDAREZDAMIAN ZHOU     Hypertension Sister      Hyperlipidemia Sister      Kidney disease Brother ISA JUANITA ZHOU     Stroke Brother ISA JUANITA ZHOU     Other (ischemic heart disease) Other          Allergies  Ciprofloxacin, Indomethacin, Sulfa (sulfonamide antibiotics), and Nsaids (non-steroidal anti-inflammatory drug)    Review of Systems     Physical Exam     Last Recorded Vitals  Blood pressure (!) 189/82, pulse 81, temperature 35.9 °C (96.6 °F), resp. rate 18, height 1.651 m (5' 5\"), weight 80.3 kg (177 lb), SpO2 96 %.       Assessment/Plan     Cervical spondylosis     Cervical medial branch nerve blocks bilateral at C4/5 and C5/6 with fluoro guidance.     Iban Ashley MD    "

## 2023-10-24 NOTE — OP NOTE
Date of Procedure: 10-     PRE-PROCEDURE DIAGNOSIS: Cervical Spondylosis    POST-PROCEDURE DIAGNOSIS: Cervical Spondylosis    PROCEDURE:    1. Bilateral Cervical medial branch blocks at C4/5 and C5/6    2. Under Fluoroscopic Guidance    ANESTHESIA: Local    COMPLICATIONS: None    CLINICAL DATA: The patient is a 72yo F presenting with a history of pain in her neck.     PROCEDURE: The patient was identified in the preop area. After risks and benefits were explained, informed consent was obtained. The patient was brought back to the operating room and placed in the prone position. Standard ASA monitors were applied and monitored throughout the procedure. The vitals signs were stable throughout.    The patient's cervical spine was prepped and draped in usual sterile fashion. Using fluoroscopic guidance the skin overlying the trajectory to the bilateral C4/5 and C5/6 facets was anesthetized with a total of 4 ml of 0.5% Lidocaine. Thereafter, 3 - 2 inch long 22G Quincke needles were advanced under fluoroscopic guidance initially in the AP view and then confirmed in the lateral view to their appropriate anatomical targets. Next, a total of 4 mL of 0.5% Ropivicaine and 40 mg of Kenalog was injected in equal and divided doses amongst all 4 sites. The needles were removed and a sterile dressing was applied. The patient tolerated the procedure well and the patient was transported to recovery in good condition.

## 2023-11-01 DIAGNOSIS — J06.9 UPPER RESPIRATORY TRACT INFECTION, UNSPECIFIED TYPE: ICD-10-CM

## 2023-11-02 RX ORDER — AZITHROMYCIN 250 MG/1
TABLET, FILM COATED ORAL
Qty: 6 TABLET | Refills: 72 | Status: SHIPPED | OUTPATIENT
Start: 2023-11-02 | End: 2023-12-01 | Stop reason: ALTCHOICE

## 2023-11-17 ENCOUNTER — LAB (OUTPATIENT)
Dept: LAB | Facility: LAB | Age: 73
End: 2023-11-17
Payer: MEDICARE

## 2023-11-17 ENCOUNTER — CONSULT (OUTPATIENT)
Dept: ALLERGY | Facility: CLINIC | Age: 73
End: 2023-11-17
Payer: MEDICARE

## 2023-11-17 ENCOUNTER — TELEPHONE (OUTPATIENT)
Dept: PAIN MEDICINE | Facility: CLINIC | Age: 73
End: 2023-11-17

## 2023-11-17 VITALS
WEIGHT: 175 LBS | HEART RATE: 74 BPM | OXYGEN SATURATION: 95 % | DIASTOLIC BLOOD PRESSURE: 78 MMHG | TEMPERATURE: 98.3 F | SYSTOLIC BLOOD PRESSURE: 130 MMHG | RESPIRATION RATE: 18 BRPM | BODY MASS INDEX: 29.12 KG/M2

## 2023-11-17 DIAGNOSIS — T78.3XXA ANGIOEDEMA, INITIAL ENCOUNTER: ICD-10-CM

## 2023-11-17 DIAGNOSIS — E03.9 HYPOTHYROIDISM, UNSPECIFIED TYPE: ICD-10-CM

## 2023-11-17 DIAGNOSIS — R23.2 FLUSHING: Primary | ICD-10-CM

## 2023-11-17 DIAGNOSIS — R23.2 FLUSHING: ICD-10-CM

## 2023-11-17 DIAGNOSIS — R22.0 FACIAL SWELLING: ICD-10-CM

## 2023-11-17 DIAGNOSIS — L50.9 HIVES: ICD-10-CM

## 2023-11-17 DIAGNOSIS — L43.9 LICHEN PLANUS: ICD-10-CM

## 2023-11-17 DIAGNOSIS — J31.0 CHRONIC RHINITIS: ICD-10-CM

## 2023-11-17 DIAGNOSIS — M54.12 RIGHT CERVICAL RADICULOPATHY: Primary | ICD-10-CM

## 2023-11-17 LAB
ALBUMIN SERPL BCP-MCNC: 4.3 G/DL (ref 3.4–5)
ALP SERPL-CCNC: 77 U/L (ref 33–136)
ALT SERPL W P-5'-P-CCNC: 13 U/L (ref 7–45)
ANION GAP SERPL CALC-SCNC: 13 MMOL/L (ref 10–20)
AST SERPL W P-5'-P-CCNC: 21 U/L (ref 9–39)
BASOPHILS # BLD AUTO: 0.06 X10*3/UL (ref 0–0.1)
BASOPHILS NFR BLD AUTO: 0.9 %
BILIRUB SERPL-MCNC: 0.7 MG/DL (ref 0–1.2)
BUN SERPL-MCNC: 13 MG/DL (ref 6–23)
C4 SERPL-MCNC: 47 MG/DL (ref 10–50)
CALCIUM SERPL-MCNC: 9.7 MG/DL (ref 8.6–10.6)
CHLORIDE SERPL-SCNC: 102 MMOL/L (ref 98–107)
CO2 SERPL-SCNC: 29 MMOL/L (ref 21–32)
CREAT SERPL-MCNC: 0.8 MG/DL (ref 0.5–1.05)
CRP SERPL-MCNC: 0.37 MG/DL
EOSINOPHIL # BLD AUTO: 0.48 X10*3/UL (ref 0–0.4)
EOSINOPHIL NFR BLD AUTO: 7 %
ERYTHROCYTE [DISTWIDTH] IN BLOOD BY AUTOMATED COUNT: 13.8 % (ref 11.5–14.5)
ERYTHROCYTE [SEDIMENTATION RATE] IN BLOOD BY WESTERGREN METHOD: 13 MM/H (ref 0–30)
GFR SERPL CREATININE-BSD FRML MDRD: 78 ML/MIN/1.73M*2
GLUCOSE SERPL-MCNC: 77 MG/DL (ref 74–99)
HCT VFR BLD AUTO: 40.2 % (ref 36–46)
HGB BLD-MCNC: 12.7 G/DL (ref 12–16)
IMM GRANULOCYTES # BLD AUTO: 0.01 X10*3/UL (ref 0–0.5)
IMM GRANULOCYTES NFR BLD AUTO: 0.1 % (ref 0–0.9)
LYMPHOCYTES # BLD AUTO: 1.39 X10*3/UL (ref 0.8–3)
LYMPHOCYTES NFR BLD AUTO: 20.4 %
MCH RBC QN AUTO: 29.6 PG (ref 26–34)
MCHC RBC AUTO-ENTMCNC: 31.6 G/DL (ref 32–36)
MCV RBC AUTO: 94 FL (ref 80–100)
MONOCYTES # BLD AUTO: 0.74 X10*3/UL (ref 0.05–0.8)
MONOCYTES NFR BLD AUTO: 10.8 %
NEUTROPHILS # BLD AUTO: 4.15 X10*3/UL (ref 1.6–5.5)
NEUTROPHILS NFR BLD AUTO: 60.8 %
NRBC BLD-RTO: 0 /100 WBCS (ref 0–0)
PLATELET # BLD AUTO: 263 X10*3/UL (ref 150–450)
POTASSIUM SERPL-SCNC: 4.2 MMOL/L (ref 3.5–5.3)
PROT SERPL-MCNC: 6.3 G/DL (ref 6.4–8.2)
RBC # BLD AUTO: 4.29 X10*6/UL (ref 4–5.2)
SODIUM SERPL-SCNC: 140 MMOL/L (ref 136–145)
THYROPEROXIDASE AB SERPL-ACNC: <28 IU/ML
WBC # BLD AUTO: 6.8 X10*3/UL (ref 4.4–11.3)

## 2023-11-17 PROCEDURE — 86376 MICROSOMAL ANTIBODY EACH: CPT

## 2023-11-17 PROCEDURE — 36415 COLL VENOUS BLD VENIPUNCTURE: CPT

## 2023-11-17 PROCEDURE — 86160 COMPLEMENT ANTIGEN: CPT

## 2023-11-17 PROCEDURE — 80053 COMPREHEN METABOLIC PANEL: CPT

## 2023-11-17 PROCEDURE — 86038 ANTINUCLEAR ANTIBODIES: CPT

## 2023-11-17 PROCEDURE — 85025 COMPLETE CBC W/AUTO DIFF WBC: CPT

## 2023-11-17 PROCEDURE — 83835 ASSAY OF METANEPHRINES: CPT

## 2023-11-17 PROCEDURE — 86161 COMPLEMENT/FUNCTION ACTIVITY: CPT

## 2023-11-17 PROCEDURE — 99204 OFFICE O/P NEW MOD 45 MIN: CPT | Performed by: ALLERGY & IMMUNOLOGY

## 2023-11-17 PROCEDURE — 82308 ASSAY OF CALCITONIN: CPT

## 2023-11-17 PROCEDURE — 86140 C-REACTIVE PROTEIN: CPT

## 2023-11-17 PROCEDURE — 84586 ASSAY OF VIP: CPT

## 2023-11-17 PROCEDURE — 85652 RBC SED RATE AUTOMATED: CPT

## 2023-11-17 PROCEDURE — 83520 IMMUNOASSAY QUANT NOS NONAB: CPT

## 2023-11-17 ASSESSMENT — ENCOUNTER SYMPTOMS
HEMATOLOGIC/LYMPHATIC NEGATIVE: 1
PSYCHIATRIC NEGATIVE: 1
MUSCULOSKELETAL NEGATIVE: 1
RESPIRATORY NEGATIVE: 1
NEUROLOGICAL NEGATIVE: 1
EYES NEGATIVE: 1
GASTROINTESTINAL NEGATIVE: 1
ENDOCRINE NEGATIVE: 1
CONSTITUTIONAL NEGATIVE: 1
CARDIOVASCULAR NEGATIVE: 1

## 2023-11-17 NOTE — TELEPHONE ENCOUNTER
She would like a 90 day supply of Gabapentin with 3 refills please. Things are going well on it.  [facility].

## 2023-11-17 NOTE — PATIENT INSTRUCTIONS
Try taking nightly antihistamine such as allegra or xyzal  I will call with results of labs when they are all back.  Plan a follow up visit in 6 months when you are back from [place]

## 2023-11-17 NOTE — PROGRESS NOTES
Subjective   Patient ID: Galina Chao is a 73 y.o. female.    Chief Complaint: NPV referred by Dr. Gonzalez  72 yo with history of flushing rash and feeling hot.  Just used to get pink and flushed but is worse for the last 6 months.  Saw NP at [place] and referred to allergy.  She was given a topical ointment, but it did not help. She had previously used this topical and it did not help.    The rash comes out of nowhere. Lasts 3 days.  There is associated swelling, some ithcing, but mostly burning. Sometimes uses topicals, sometimes just cool water.  Denies any systemic symptoms.  She does have a history of COPD and Empheysema.  Breathing is improved since losing 62 lbs  She has a history of hypothyroid.  She has GERD and IBS. She does not associated the foods.  She has Lichen planus on the skin and in the mouth.  She is on Hydroxychloroquine for the lichen planus.  No new medications.  History of thyroid disorder.  No changes in medications.  No recent travel history.      Environmental History: no pets, no new exposures.    Review of Systems   Constitutional: Negative.    HENT: Negative.     Eyes: Negative.    Respiratory: Negative.     Cardiovascular: Negative.    Gastrointestinal: Negative.    Endocrine: Negative.    Genitourinary: Negative.    Musculoskeletal: Negative.    Neurological: Negative.    Hematological: Negative.    Psychiatric/Behavioral: Negative.     /78   Pulse 74   Temp 36.8 °C (98.3 °F)   Resp 18   Wt 79.4 kg (175 lb)   SpO2 95%   BMI 29.12 kg/m²     Objective   Physical Exam  Vitals and nursing note reviewed.   Constitutional:       Appearance: Normal appearance.   HENT:      Right Ear: Tympanic membrane normal.      Left Ear: Tympanic membrane normal.      Nose: Nose normal.      Mouth/Throat:      Mouth: Mucous membranes are moist.   Eyes:      Conjunctiva/sclera: Conjunctivae normal.      Pupils: Pupils are equal, round, and reactive to light.   Cardiovascular:      Rate and Rhythm:  Normal rate and regular rhythm.      Heart sounds: Normal heart sounds.   Pulmonary:      Effort: Pulmonary effort is normal.      Breath sounds: Normal breath sounds.   Abdominal:      General: Bowel sounds are normal.      Palpations: Abdomen is soft.   Musculoskeletal:         General: Normal range of motion.   Skin:     General: Skin is warm and dry.      Capillary Refill: Capillary refill takes less than 2 seconds.   Neurological:      General: No focal deficit present.      Mental Status: She is alert and oriented to person, place, and time.   Psychiatric:         Mood and Affect: Mood normal.         Laboratory:   Recent labs reviewed    Assessment/Plan    74 yo with history of flushing of face and neck, burning and itching sensation without an obvious trigger.  -obtain evaluation for flushing and swelling via labs  -I will call results. Will plan in person follow up once she returns from [place] in the spring.  -start nightly antihistamine. Xyzal sample provided to try.

## 2023-11-19 LAB — CALCIT SERPL-MCNC: <2 PG/ML (ref 0–5.1)

## 2023-11-20 LAB
ANA SER QL HEP2 SUBST: NEGATIVE
C1INH SERPL-MCNC: 45 MG/DL (ref 21–38)
TRYPTASE SERPL-MCNC: 6.2 UG/L

## 2023-11-21 ENCOUNTER — APPOINTMENT (OUTPATIENT)
Dept: PAIN MEDICINE | Facility: CLINIC | Age: 73
End: 2023-11-21
Payer: MEDICARE

## 2023-11-21 ENCOUNTER — APPOINTMENT (OUTPATIENT)
Dept: RADIOLOGY | Facility: CLINIC | Age: 73
End: 2023-11-21
Payer: MEDICARE

## 2023-11-21 LAB
ANNOTATION COMMENT IMP: ABNORMAL
METANEPHS SERPL-SCNC: 0.16 NMOL/L (ref 0–0.49)
NORMETANEPH FREE SERPL-SCNC: 1.28 NMOL/L (ref 0–0.89)

## 2023-11-21 RX ORDER — GABAPENTIN 300 MG/1
300 CAPSULE ORAL 2 TIMES DAILY
Qty: 180 CAPSULE | Refills: 3 | Status: SHIPPED | OUTPATIENT
Start: 2023-11-21 | End: 2024-05-13 | Stop reason: SDUPTHER

## 2023-11-21 NOTE — TELEPHONE ENCOUNTER
OARRS reviewed and their refill has been sent. Please notify the patient that their refill has been sent in electronically.

## 2023-11-22 ENCOUNTER — APPOINTMENT (OUTPATIENT)
Dept: PAIN MEDICINE | Facility: CLINIC | Age: 73
End: 2023-11-22
Payer: MEDICARE

## 2023-11-29 LAB — C1Q SERPL-MCNC: 12.3 MG/DL (ref 10.3–20.5)

## 2023-12-01 ENCOUNTER — OFFICE VISIT (OUTPATIENT)
Dept: PAIN MEDICINE | Facility: CLINIC | Age: 73
End: 2023-12-01
Payer: MEDICARE

## 2023-12-01 VITALS
TEMPERATURE: 97 F | HEART RATE: 80 BPM | WEIGHT: 175 LBS | BODY MASS INDEX: 28.12 KG/M2 | HEIGHT: 66 IN | RESPIRATION RATE: 15 BRPM | DIASTOLIC BLOOD PRESSURE: 74 MMHG | SYSTOLIC BLOOD PRESSURE: 177 MMHG

## 2023-12-01 DIAGNOSIS — G89.28 CHRONIC NECK PAIN WITH HISTORY OF CERVICAL SPINAL SURGERY: ICD-10-CM

## 2023-12-01 DIAGNOSIS — M47.812 CERVICAL SPONDYLOSIS: ICD-10-CM

## 2023-12-01 DIAGNOSIS — Z98.890 CHRONIC NECK PAIN WITH HISTORY OF CERVICAL SPINAL SURGERY: ICD-10-CM

## 2023-12-01 DIAGNOSIS — M54.12 RIGHT CERVICAL RADICULOPATHY: Primary | ICD-10-CM

## 2023-12-01 DIAGNOSIS — M54.2 CHRONIC NECK PAIN WITH HISTORY OF CERVICAL SPINAL SURGERY: ICD-10-CM

## 2023-12-01 PROCEDURE — 99214 OFFICE O/P EST MOD 30 MIN: CPT | Performed by: ANESTHESIOLOGY

## 2023-12-01 RX ORDER — HYDROCODONE BITARTRATE AND ACETAMINOPHEN 5; 325 MG/1; MG/1
1 TABLET ORAL 3 TIMES DAILY PRN
COMMUNITY
End: 2023-12-12 | Stop reason: SDUPTHER

## 2023-12-01 SDOH — ECONOMIC STABILITY: FOOD INSECURITY: WITHIN THE PAST 12 MONTHS, YOU WORRIED THAT YOUR FOOD WOULD RUN OUT BEFORE YOU GOT MONEY TO BUY MORE.: NEVER TRUE

## 2023-12-01 SDOH — ECONOMIC STABILITY: FOOD INSECURITY: WITHIN THE PAST 12 MONTHS, THE FOOD YOU BOUGHT JUST DIDN'T LAST AND YOU DIDN'T HAVE MONEY TO GET MORE.: NEVER TRUE

## 2023-12-01 ASSESSMENT — PAIN SCALES - GENERAL: PAINLEVEL: 5

## 2023-12-01 ASSESSMENT — COLUMBIA-SUICIDE SEVERITY RATING SCALE - C-SSRS
6. HAVE YOU EVER DONE ANYTHING, STARTED TO DO ANYTHING, OR PREPARED TO DO ANYTHING TO END YOUR LIFE?: NO
2. HAVE YOU ACTUALLY HAD ANY THOUGHTS OF KILLING YOURSELF?: NO
1. IN THE PAST MONTH, HAVE YOU WISHED YOU WERE DEAD OR WISHED YOU COULD GO TO SLEEP AND NOT WAKE UP?: NO

## 2023-12-01 ASSESSMENT — LIFESTYLE VARIABLES
HOW OFTEN DO YOU HAVE SIX OR MORE DRINKS ON ONE OCCASION: NEVER
AUDIT-C TOTAL SCORE: 0
HOW OFTEN DO YOU HAVE A DRINK CONTAINING ALCOHOL: NEVER
HOW MANY STANDARD DRINKS CONTAINING ALCOHOL DO YOU HAVE ON A TYPICAL DAY: PATIENT DOES NOT DRINK
SKIP TO QUESTIONS 9-10: 1

## 2023-12-01 ASSESSMENT — PATIENT HEALTH QUESTIONNAIRE - PHQ9
1. LITTLE INTEREST OR PLEASURE IN DOING THINGS: NOT AT ALL
SUM OF ALL RESPONSES TO PHQ9 QUESTIONS 1 AND 2: 0
2. FEELING DOWN, DEPRESSED OR HOPELESS: NOT AT ALL

## 2023-12-01 NOTE — PROGRESS NOTES
History Of Present Illness  Galina Chao is a 73 y.o. female presenting with   Chief Complaint   Patient presents with   • Neck Pain       Patient who is RHD RETURNS for chronic neck pain and also chronic low back pain low back. The pt is s/p lumbar surgery x 3 in the 1970s and POSTERIOR Cervical surgery x 1 as well. The pain is constant, worse with activity and better with rest. The pain is sharp, stabbing and shooting to the B/L UE worse on the left side. Denies LE weakness, saddle anesthesia, bowel or bladder incontinence. To manage this pain the patient has attempted Tylenol and reports she takes Hydrocodone rarely. The patients chronic HTN, DLD, GERD, Hypothyroidism, Insomnia are stable.     PAIN SCORE: 7-9/10.     PSHx  1973, 1976, 1989 - Lumbar surgery  1986 - Cervical surgery   -endometrial ablasion  -oophorectomy   -emilia  -Right tibia fx  -PCI x 1  -Bilateral TKA - Dr. 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 Gonzalez (was Dr. Moctezuma)   Surgeon: Dr. Recio     Past Medical History  She has a past medical history of Allergic, Arthritis, COPD (chronic obstructive pulmonary disease) (CMS/HCC), Disease of thyroid gland, Diverticulosis of intestine, part unspecified, without perforation or abscess without bleeding, Emphysema of lung (CMS/HCC), GERD (gastroesophageal reflux disease), Heart murmur, Hypertension, Inflammatory bowel disease, Obesity, unspecified, Personal history of other diseases of the circulatory system, Personal history of other diseases of the circulatory system (10/09/2020), Personal history of other diseases of the circulatory system, Personal history of other diseases of the digestive system, Personal history of other diseases of the digestive system, Personal history of other diseases of the musculoskeletal system and connective tissue, Personal history of other diseases of the musculoskeletal system and  connective tissue, Personal history of other diseases of the musculoskeletal system and connective tissue, Personal history of other diseases of the respiratory system, Personal history of other diseases of the respiratory system, and Personal history of other endocrine, nutritional and metabolic disease.    Surgical History  She has a past surgical history that includes Cholecystectomy (05/24/2016); Other surgical history (06/15/2022); Other surgical history (06/15/2022); Other surgical history (04/27/2018); Other surgical history (04/27/2018); Appendectomy (04/27/2018); Cardiac surgery (10/12/2017); Back surgery (10/12/2017); Cardiac catheterization; Endometrial ablation; and Joint replacement.     Social History  She reports that she quit smoking about 27 years ago. Her smoking use included cigarettes. She started smoking about 60 years ago. She has a 60.00 pack-year smoking history. She has never used smokeless tobacco. She reports current alcohol use. She reports that she does not use drugs.    Family History  Family History   Problem Relation Name Age of Onset   • Cancer Mother JEFRY GRANT    • Hypertension Mother JEFRY GRANT    • Cancer Father ISA GRANT JR    • Hypertension Father ISA GRANT JR    • Leukemia Father ISA GRANT JR    • Hypertension Sister     • Hyperlipidemia Sister     • Kidney disease Brother ISA GRANT JR    • Stroke Brother ISA GRANT JR    • Other (ischemic heart disease) Other          Allergies  Ciprofloxacin, Indomethacin, Sulfa (sulfonamide antibiotics), and Nsaids (non-steroidal anti-inflammatory drug)    Review of Systems    All other systems reviewed and negative for any deficits. Pertinent positives and negatives were considered in the medical decision making process.        Physical Exam  /74   Pulse 80   Temp 36.1 °C (97 °F)   Resp 15   Wt 79.4 kg (175 lb)     General: Pt appears stated age     Eyes: Conjunctiva non-icteric and lids without obvious rash or  drooping. Pupils are symmetric     ENT: External ears are without deformity or rash. Hearing is grossly intact     Neck: No JVD noted, tracheal position midline.      Respiratory: No gasping or shortness of breath noted, no use of accessory muscles noted     Cardiovascular: Extremities show no edema or varicosities     Skin: No rashes or open lesions/ulcers identified on skin. No induration/tightening noted with palpation of skin     Musculoskeletal: Gait is grossly normal     Digits/nails show no clubbing or cyanosis     Exam of muscles/joints/bones shows no gross atrophy and no abnormal/involuntary movements in the head/neckNo asymmetry or masses noted in the head/neck     Stability: no subluxation noted on movement of bilateral upper extremities or head/neck     Strength: 5/5 in B/L upper and lower extremities      Range of Motion: WNL      Neurologic: Reflexes: 2+      Sensation: In tact      Cranial nerves 2-12 are grossly intact     Psychiatric: Pt is alert and oriented to time, place and person.            Assessment/Plan   1. Right cervical radiculopathy        2. Chronic neck pain with history of cervical spinal surgery        3. Cervical spondylosis             · Chronic neck pain with history of cervical spinal surgery (723.1,338.29,V45.89)  (M54.2,G89.28,Z98.890)   · Chronic low back pain (724.2,338.29) (M54.50,G89.29)   · Cervical postlaminectomy syndrome (722.81) (M96.1)   · Lumbar postlaminectomy syndrome (722.83) (M96.1)   · Chronic neck pain (723.1,338.29) (M54.2,G89.29)   · Spondylosis of cervical region without myelopathy or radiculopathy (721.0) (M47.812)     Provider Impressions     1. I have provided the patient with a list of physical therapy exercises to learn and perform to strengthen core, maintain stabilization, and reduce pain. We reviewed the exercises in detail and I encouraged them to perform them on a regular basis.     2. I would recommend the pt start on Gabapentin to help with  nerve related pain. We discussed the risks, benefits, and side effects to this medication including the mechanism of action and the pt understands and agrees.     3. I again reviewed the patients MRI findings in detail, including review of the actual images and provided a detailed explanation of the findings using a spine model.      The pt had an MRI (5-2023) and it showed degenerative marrow signal a the C6/7 level and she is s/p posterior decompression from C5/6 thru C7/T1.      There are disc osteophyte complexes at C2/3 and C3/4 there is small disc protrusion at the C4/5 level.      There is noted canal stenosis worst at the C3/4 level that is mild to moderate.     Her CT scan 2023 as noted several levels of facet hypertrophy.      4. The patient is a candidate for a Cervical facet blocks at the bilateral C4/5, C5/6 with fluoroscopic guidance versus treat neck. I spent time with the patient discussing all of the risks, benefits, and alternatives to this measure. Including but not limited to spinal infection, epidural hematoma/abscess, paralysis, nerve injury, steroid effects, and spinal headache. The patient understands and agrees to proceed.     Pt has failed over 3 months of conservative therapy including heat/ice and massage.     Her last diagnostic block was a medial branch block at C4/5 and C5/6 on 10- and she obtained 90% relief of her pain that lasted for several weeks time. She reports she was able to turn her head with less pain and was able to sleep with less pain.     Will plan dx block number 2 on 12-.      I spent time with the patient reviewing their imaging and discussing the risks benefits and alternatives to the above plan. A total of 30 minutes was spent reviewing the data and greater than 50% of that time was with the patient during the face to face encounter discussing treatment options both surgical, non-surgical, and minimally invasive techniques.    Iban Ashley MD

## 2023-12-04 ENCOUNTER — HOSPITAL ENCOUNTER (EMERGENCY)
Facility: HOSPITAL | Age: 73
Discharge: HOME | End: 2023-12-04
Payer: MEDICARE

## 2023-12-04 ENCOUNTER — APPOINTMENT (OUTPATIENT)
Dept: RADIOLOGY | Facility: HOSPITAL | Age: 73
End: 2023-12-04
Payer: MEDICARE

## 2023-12-04 VITALS
OXYGEN SATURATION: 98 % | SYSTOLIC BLOOD PRESSURE: 170 MMHG | DIASTOLIC BLOOD PRESSURE: 80 MMHG | BODY MASS INDEX: 28.45 KG/M2 | RESPIRATION RATE: 15 BRPM | HEART RATE: 68 BPM | WEIGHT: 177 LBS | HEIGHT: 66 IN | TEMPERATURE: 98.6 F

## 2023-12-04 DIAGNOSIS — S09.90XA CLOSED HEAD INJURY, INITIAL ENCOUNTER: Primary | ICD-10-CM

## 2023-12-04 PROCEDURE — 99285 EMERGENCY DEPT VISIT HI MDM: CPT | Mod: 25

## 2023-12-04 PROCEDURE — 70450 CT HEAD/BRAIN W/O DYE: CPT | Performed by: STUDENT IN AN ORGANIZED HEALTH CARE EDUCATION/TRAINING PROGRAM

## 2023-12-04 PROCEDURE — 94760 N-INVAS EAR/PLS OXIMETRY 1: CPT

## 2023-12-04 PROCEDURE — 72125 CT NECK SPINE W/O DYE: CPT

## 2023-12-04 PROCEDURE — 70450 CT HEAD/BRAIN W/O DYE: CPT

## 2023-12-04 PROCEDURE — 72125 CT NECK SPINE W/O DYE: CPT | Performed by: STUDENT IN AN ORGANIZED HEALTH CARE EDUCATION/TRAINING PROGRAM

## 2023-12-04 ASSESSMENT — LIFESTYLE VARIABLES
EVER HAD A DRINK FIRST THING IN THE MORNING TO STEADY YOUR NERVES TO GET RID OF A HANGOVER: NO
HAVE PEOPLE ANNOYED YOU BY CRITICIZING YOUR DRINKING: NO
HAVE YOU EVER FELT YOU SHOULD CUT DOWN ON YOUR DRINKING: NO
EVER FELT BAD OR GUILTY ABOUT YOUR DRINKING: NO
REASON UNABLE TO ASSESS: NO

## 2023-12-04 ASSESSMENT — COLUMBIA-SUICIDE SEVERITY RATING SCALE - C-SSRS
2. HAVE YOU ACTUALLY HAD ANY THOUGHTS OF KILLING YOURSELF?: NO
6. HAVE YOU EVER DONE ANYTHING, STARTED TO DO ANYTHING, OR PREPARED TO DO ANYTHING TO END YOUR LIFE?: NO
1. IN THE PAST MONTH, HAVE YOU WISHED YOU WERE DEAD OR WISHED YOU COULD GO TO SLEEP AND NOT WAKE UP?: NO

## 2023-12-04 ASSESSMENT — PAIN - FUNCTIONAL ASSESSMENT: PAIN_FUNCTIONAL_ASSESSMENT: 0-10

## 2023-12-04 ASSESSMENT — PAIN DESCRIPTION - LOCATION: LOCATION: HEAD

## 2023-12-04 ASSESSMENT — PAIN SCALES - GENERAL: PAINLEVEL_OUTOF10: 7

## 2023-12-04 ASSESSMENT — PAIN DESCRIPTION - PAIN TYPE: TYPE: ACUTE PAIN

## 2023-12-04 NOTE — ED TRIAGE NOTES
73-year-old female presents ED mechanical fall when she was trying to dislodge a piece of carpet she fell backwards hitting her head on a wooden piece of her couch, patient takes low-dose aspirin daily, she denies any loss of consciousness, she is complaining of a moderate headache, she also notes having chronic cervical pain and is scheduled for nerve block, she denies any upper/lower extremity weakness numbness or paresthesia denies any nausea vomiting no visual acuity changes.  Denies any midline thoracic or lumbar tenderness.    Normal physical exam:    General: Vitals noted, no distress. Afebrile. Alert and oriented  x 3.     EENT: TMs clear. Posterior oropharynx unremarkable. No meningismus. No LAD.   Small hematoma on the parietal scalp right side    Cardiac: Regular, rate, rhythm, no murmurs rubs or gallops.     Pulmonary: Lungs clear bilaterally with good aeration. No adventitious breath sounds. No wheezes rales or rhonchi.     Abdomen: Soft, nonsurgical. Nontender. No peritoneal signs. Normoactive bowel sounds. No pulsatile masses.     Extremities: No peripheral edema. Negative Homans bilaterally, no cords.    Skin: No rash. Intact.     Neuro: No focal neurologic deficits, NIH score of 0. Cranial nerves normal as tested from II through XII.

## 2023-12-05 DIAGNOSIS — M47.817 FACET ARTHROPATHY, LUMBOSACRAL: Primary | ICD-10-CM

## 2023-12-05 DIAGNOSIS — M47.812 CERVICAL SPONDYLOSIS: ICD-10-CM

## 2023-12-07 ENCOUNTER — ANCILLARY PROCEDURE (OUTPATIENT)
Dept: RADIOLOGY | Facility: CLINIC | Age: 73
End: 2023-12-07
Payer: MEDICARE

## 2023-12-07 ENCOUNTER — HOSPITAL ENCOUNTER (OUTPATIENT)
Dept: PAIN MEDICINE | Facility: CLINIC | Age: 73
Discharge: HOME | End: 2023-12-07
Payer: MEDICARE

## 2023-12-07 VITALS
OXYGEN SATURATION: 96 % | DIASTOLIC BLOOD PRESSURE: 66 MMHG | HEART RATE: 67 BPM | SYSTOLIC BLOOD PRESSURE: 149 MMHG | RESPIRATION RATE: 20 BRPM | TEMPERATURE: 96.4 F

## 2023-12-07 DIAGNOSIS — M47.812 CERVICAL SPONDYLOSIS: Primary | ICD-10-CM

## 2023-12-07 DIAGNOSIS — M47.812 CERVICAL SPONDYLOSIS: ICD-10-CM

## 2023-12-07 DIAGNOSIS — M47.817 FACET ARTHROPATHY, LUMBOSACRAL: ICD-10-CM

## 2023-12-07 PROCEDURE — 77003 FLUOROGUIDE FOR SPINE INJECT: CPT

## 2023-12-07 PROCEDURE — 64491 INJ PARAVERT F JNT C/T 2 LEV: CPT

## 2023-12-07 PROCEDURE — 7100000009 HC PHASE TWO TIME - INITIAL BASE CHARGE

## 2023-12-07 PROCEDURE — 64490 INJ PARAVERT F JNT C/T 1 LEV: CPT | Performed by: ANESTHESIOLOGY

## 2023-12-07 PROCEDURE — 2500000004 HC RX 250 GENERAL PHARMACY W/ HCPCS (ALT 636 FOR OP/ED)

## 2023-12-07 PROCEDURE — 2500000005 HC RX 250 GENERAL PHARMACY W/O HCPCS

## 2023-12-07 PROCEDURE — 7100000010 HC PHASE TWO TIME - EACH INCREMENTAL 1 MINUTE

## 2023-12-07 RX ORDER — ROPIVACAINE HYDROCHLORIDE 5 MG/ML
INJECTION, SOLUTION EPIDURAL; INFILTRATION; PERINEURAL
Status: COMPLETED
Start: 2023-12-07 | End: 2023-12-07

## 2023-12-07 RX ORDER — TRIAMCINOLONE ACETONIDE 40 MG/ML
INJECTION, SUSPENSION INTRA-ARTICULAR; INTRAMUSCULAR
Status: COMPLETED
Start: 2023-12-07 | End: 2023-12-07

## 2023-12-07 RX ORDER — LIDOCAINE HYDROCHLORIDE 5 MG/ML
INJECTION, SOLUTION INFILTRATION; INTRAVENOUS
Status: COMPLETED
Start: 2023-12-07 | End: 2023-12-07

## 2023-12-07 RX ADMIN — ROPIVACAINE HYDROCHLORIDE 150 MG: 5 INJECTION, SOLUTION EPIDURAL; INFILTRATION; PERINEURAL at 14:38

## 2023-12-07 RX ADMIN — TRIAMCINOLONE ACETONIDE 40 MG: 40 INJECTION, SUSPENSION INTRA-ARTICULAR; INTRAMUSCULAR at 14:38

## 2023-12-07 RX ADMIN — LIDOCAINE HYDROCHLORIDE 250 MG: 5 INJECTION, SOLUTION INFILTRATION at 14:38

## 2023-12-07 ASSESSMENT — PAIN SCALES - GENERAL
PAINLEVEL_OUTOF10: 8
PAINLEVEL_OUTOF10: 8

## 2023-12-07 ASSESSMENT — ENCOUNTER SYMPTOMS
OCCASIONAL FEELINGS OF UNSTEADINESS: 1
DEPRESSION: 0
LOSS OF SENSATION IN FEET: 0

## 2023-12-07 ASSESSMENT — PAIN - FUNCTIONAL ASSESSMENT
PAIN_FUNCTIONAL_ASSESSMENT: 0-10
PAIN_FUNCTIONAL_ASSESSMENT: 0-10

## 2023-12-07 NOTE — Clinical Note
Prepped with ChloraPrep, a minimum of 3 minute dry time, longer if needed, no pooling noted, patient draped in sterile fashion. POSTERIOR NECK

## 2023-12-07 NOTE — Clinical Note
Universal procedure checklist and airway assessment completed. [Fever] : no fever [Chills] : no chills [Eye Pain] : no eye pain [Red Eyes] : eyes not red [Earache] : no earache [Loss Of Hearing] : no hearing loss [Heart Rate Is Slow] : the heart rate was not slow [Heart Rate Is Fast] : the heart rate was not fast [Shortness Of Breath] : no shortness of breath [Wheezing] : no wheezing [Abdominal Pain] : no abdominal pain [Vomiting] : no vomiting [Arthralgias] : no arthralgias [Joint Pain] : no joint pain [Skin Lesions] : no skin lesions [Skin Wound] : no skin wound [Confused] : no confusion [Convulsions] : no convulsions [Proptosis] : no proptosis [Hot Flashes] : no hot flashes [Easy Bleeding] : no tendency for easy bleeding [Easy Bruising] : no tendency for easy bruising [Negative] : Heme/Lymph

## 2023-12-07 NOTE — OP NOTE
Date of Procedure: 12/7/2023     PRE-PROCEDURE DIAGNOSIS: Cervical Spondylosis    POST-PROCEDURE DIAGNOSIS: Cervical Spondylosis    PROCEDURE:    1. Bilateral Cervical medial branch blocks at C4/5 and C5/6  2. Under Fluoroscopic Guidance    ANESTHESIA: Local    COMPLICATIONS: None    CLINICAL DATA: The patient is a 73 y.o. female presenting with a history of pain across their neck.     PROCEDURE: The patient was identified in the preop area. After risks and benefits were explained, informed consent was obtained. The patient was brought back to the operating room and placed in the prone position. Standard ASA monitors were applied and monitored throughout the procedure. The vitals signs were stable throughout.    The patient's cervical spine was prepped and draped in usual sterile fashion. Using fluoroscopic guidance the skin overlying the trajectory to the bilateral C4/5 and C5/6 facets was anesthetized with a total of 4 ml of 0.5% Lidocaine. Thereafter, four - 3.5 inch long 22G Quincke needles were advanced under fluoroscopic guidance initially in the AP view and then confirmed in the lateral view to their appropriate anatomical targets. Next, a total of 4 mL of 0.5% Ropivicaine was injected in equal and divided doses amongst all 4 sites. The needles were removed and a sterile dressing was applied. The patient tolerated the procedure well and the patient was transported to recovery in good condition.    Plan: The patient will follow up with us in the office to review the results of her procedure. Discharge instructions were reviewed with the patient in recovery.

## 2023-12-08 ENCOUNTER — APPOINTMENT (OUTPATIENT)
Dept: PAIN MEDICINE | Facility: CLINIC | Age: 73
End: 2023-12-08
Payer: MEDICARE

## 2023-12-08 ENCOUNTER — APPOINTMENT (OUTPATIENT)
Dept: RADIOLOGY | Facility: CLINIC | Age: 73
End: 2023-12-08
Payer: MEDICARE

## 2023-12-12 ENCOUNTER — TELEPHONE (OUTPATIENT)
Dept: CARDIOLOGY | Facility: CLINIC | Age: 73
End: 2023-12-12
Payer: MEDICARE

## 2023-12-12 DIAGNOSIS — M54.50 CHRONIC BILATERAL LOW BACK PAIN WITHOUT SCIATICA: Primary | ICD-10-CM

## 2023-12-12 DIAGNOSIS — G89.29 CHRONIC BILATERAL LOW BACK PAIN WITHOUT SCIATICA: Primary | ICD-10-CM

## 2023-12-12 NOTE — TELEPHONE ENCOUNTER
Called pt who states BP has been elevated for the last month. Pt has been seeing various doctors and BP elevated at appointments.     Pt started checking BP at home: 188/90, 192/79.    Pt having eye surgery and was told she has a blood spot on her eye.    Current meds: Atenolol 25mg daily, eplerenone 25mg daily.    Please advise. Thanks.

## 2023-12-13 DIAGNOSIS — I10 ESSENTIAL HYPERTENSION: ICD-10-CM

## 2023-12-13 RX ORDER — HYDROCODONE BITARTRATE AND ACETAMINOPHEN 5; 325 MG/1; MG/1
1 TABLET ORAL 3 TIMES DAILY PRN
Qty: 20 TABLET | Refills: 0 | Status: SHIPPED | OUTPATIENT
Start: 2023-12-13 | End: 2024-01-23 | Stop reason: SDUPTHER

## 2023-12-14 RX ORDER — AMLODIPINE BESYLATE 5 MG/1
5 TABLET ORAL DAILY
Qty: 30 TABLET | Refills: 5 | Status: SHIPPED | OUTPATIENT
Start: 2023-12-14 | End: 2023-12-19 | Stop reason: SDUPTHER

## 2023-12-15 ENCOUNTER — APPOINTMENT (OUTPATIENT)
Dept: RADIOLOGY | Facility: CLINIC | Age: 73
End: 2023-12-15
Payer: MEDICARE

## 2023-12-15 ENCOUNTER — APPOINTMENT (OUTPATIENT)
Dept: PAIN MEDICINE | Facility: CLINIC | Age: 73
End: 2023-12-15
Payer: MEDICARE

## 2023-12-15 DIAGNOSIS — E78.5 HYPERLIPIDEMIA, UNSPECIFIED HYPERLIPIDEMIA TYPE: ICD-10-CM

## 2023-12-15 RX ORDER — ATORVASTATIN CALCIUM 20 MG/1
20 TABLET, FILM COATED ORAL DAILY
Qty: 90 TABLET | Refills: 3 | Status: SHIPPED | OUTPATIENT
Start: 2023-12-15

## 2023-12-19 DIAGNOSIS — I10 ESSENTIAL HYPERTENSION: ICD-10-CM

## 2023-12-19 NOTE — PROGRESS NOTES
Reviewed available labs with patient.  VIP is still pending. Patient frustrated that it is taking so long and that other labs do not take that long. I let her know I talked to the lab and there was a delay with the reagent and I will call her when it is back.

## 2023-12-20 RX ORDER — AMLODIPINE BESYLATE 5 MG/1
5 TABLET ORAL DAILY
Qty: 90 TABLET | Refills: 3 | Status: SHIPPED | OUTPATIENT
Start: 2023-12-20 | End: 2024-03-04 | Stop reason: SDUPTHER

## 2024-01-02 LAB — VIP SERPL-MCNC: <13 PG/ML (ref 0–60)

## 2024-01-03 ENCOUNTER — TELEPHONE (OUTPATIENT)
Dept: ALLERGY | Facility: CLINIC | Age: 74
End: 2024-01-03
Payer: MEDICARE

## 2024-01-03 DIAGNOSIS — R23.2 FLUSHING: Primary | ICD-10-CM

## 2024-01-03 NOTE — TELEPHONE ENCOUNTER
Reviewed labs  I will order additional labs/urine test and asked patient to follow up to review thereafter.

## 2024-01-09 ENCOUNTER — APPOINTMENT (OUTPATIENT)
Dept: PAIN MEDICINE | Facility: CLINIC | Age: 74
End: 2024-01-09
Payer: COMMERCIAL

## 2024-01-11 ENCOUNTER — TELEPHONE (OUTPATIENT)
Dept: PAIN MEDICINE | Facility: CLINIC | Age: 74
End: 2024-01-11

## 2024-01-11 ENCOUNTER — APPOINTMENT (OUTPATIENT)
Dept: PAIN MEDICINE | Facility: CLINIC | Age: 74
End: 2024-01-11
Payer: COMMERCIAL

## 2024-01-11 ENCOUNTER — OFFICE VISIT (OUTPATIENT)
Dept: PAIN MEDICINE | Facility: CLINIC | Age: 74
End: 2024-01-11
Payer: COMMERCIAL

## 2024-01-11 VITALS
RESPIRATION RATE: 18 BRPM | HEIGHT: 67 IN | BODY MASS INDEX: 27.78 KG/M2 | SYSTOLIC BLOOD PRESSURE: 138 MMHG | DIASTOLIC BLOOD PRESSURE: 64 MMHG | HEART RATE: 71 BPM | WEIGHT: 177 LBS | TEMPERATURE: 97.5 F

## 2024-01-11 DIAGNOSIS — M54.12 RIGHT CERVICAL RADICULOPATHY: ICD-10-CM

## 2024-01-11 DIAGNOSIS — M47.817 FACET ARTHRITIS OF LUMBOSACRAL REGION: ICD-10-CM

## 2024-01-11 DIAGNOSIS — M47.812 CERVICAL SPONDYLOSIS: Primary | ICD-10-CM

## 2024-01-11 PROCEDURE — 99214 OFFICE O/P EST MOD 30 MIN: CPT | Performed by: ANESTHESIOLOGY

## 2024-01-11 RX ORDER — PREDNISOLONE ACETATE 10 MG/ML
1 SUSPENSION/ DROPS OPHTHALMIC 3 TIMES DAILY
COMMUNITY
End: 2024-05-28 | Stop reason: ALTCHOICE

## 2024-01-11 ASSESSMENT — ENCOUNTER SYMPTOMS
OCCASIONAL FEELINGS OF UNSTEADINESS: 1
DEPRESSION: 0
LOSS OF SENSATION IN FEET: 1

## 2024-01-11 ASSESSMENT — PAIN SCALES - GENERAL: PAINLEVEL: 8

## 2024-01-11 NOTE — PROGRESS NOTES
Pt is a follow up visit and is complaining of pain in her neck and between shoulder blades. Pt states her pain level is an 8/10 on the pain scale. Pt had a cervical medial branch block 12/7/23 and states she received 95% pain relief for 2 weeks. Pt has had 1 neck surgery and 3 back surgeries.

## 2024-01-11 NOTE — PROGRESS NOTES
History Of Present Illness  Galina Chao is a 73 y.o. female presenting with   Chief Complaint   Patient presents with    Follow-up       Patient who is RHD follows up after medial branch blocks for chronic neck pain and also chronic low back pain low back. The pt is s/p lumbar surgery x 3 in the 1970s and POSTERIOR Cervical surgery x 1 as well. The pain is constant, worse with activity and better with rest. The pain is sharp, stabbing and shooting to the B/L UE worse on the left side. Denies LE weakness, saddle anesthesia, bowel or bladder incontinence. To manage this pain the patient has attempted Tylenol and reports she takes Hydrocodone rarely. The patients chronic HTN, DLD, GERD, Hypothyroidism, Insomnia are stable.     PAIN SCORE:  8 / 10.     PSHx  1973, 1976, 1989 - Lumbar surgery  1986 - Cervical surgery   -endometrial ablasion  -oophorectomy   -emilia  -Right tibia fx  -PCI x 1  -Bilateral TKA - Dr. 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 Gonzalez (was Dr. Moctezuma)   Surgeon: Dr. Recio     Past Medical History  She has a past medical history of Allergic, Arthritis, COPD (chronic obstructive pulmonary disease) (CMS/HCC), Disease of thyroid gland, Diverticulosis of intestine, part unspecified, without perforation or abscess without bleeding, Emphysema of lung (CMS/HCC), GERD (gastroesophageal reflux disease), Heart murmur, Hypertension, Inflammatory bowel disease, Obesity, unspecified, Personal history of other diseases of the circulatory system, Personal history of other diseases of the circulatory system (10/09/2020), Personal history of other diseases of the circulatory system, Personal history of other diseases of the digestive system, Personal history of other diseases of the digestive system, Personal history of other diseases of the musculoskeletal system and connective tissue, Personal history of other diseases of the  musculoskeletal system and connective tissue, Personal history of other diseases of the musculoskeletal system and connective tissue, Personal history of other diseases of the respiratory system, Personal history of other diseases of the respiratory system, and Personal history of other endocrine, nutritional and metabolic disease.    Surgical History  She has a past surgical history that includes Cholecystectomy (05/24/2016); Other surgical history (06/15/2022); Other surgical history (06/15/2022); Other surgical history (04/27/2018); Other surgical history (04/27/2018); Appendectomy (04/27/2018); Cardiac surgery (10/12/2017); Back surgery (10/12/2017); Cardiac catheterization; Endometrial ablation; and Joint replacement.     Social History  She reports that she quit smoking about 28 years ago. Her smoking use included cigarettes. She started smoking about 61 years ago. She has a 60.00 pack-year smoking history. She has never used smokeless tobacco. She reports current alcohol use. She reports that she does not use drugs.    Family History  Family History   Problem Relation Name Age of Onset    Cancer Mother JEFRY GRANT     Hypertension Mother JEFRY GRANT     Cancer Father ISA GRANT JR     Hypertension Father ISA GRANT JR     Leukemia Father ISA GRANT      Hypertension Sister      Hyperlipidemia Sister      Kidney disease Brother ISA ALMENDAREZDAMIAN ZHOU     Stroke Brother ISA GRANT      Other (ischemic heart disease) Other          Allergies  Ciprofloxacin, Indomethacin, Sulfa (sulfonamide antibiotics), and Nsaids (non-steroidal anti-inflammatory drug)    Review of Systems    All other systems reviewed and negative for any deficits. Pertinent positives and negatives were considered in the medical decision making process.        Physical Exam  /64   Pulse 71   Temp 36.4 °C (97.5 °F)   Resp 18   Wt 80.3 kg (177 lb)     General: Pt appears stated age     Eyes: Conjunctiva non-icteric and lids without  obvious rash or drooping. Pupils are symmetric     ENT: External ears are without deformity or rash. Hearing is grossly intact     Neck: No JVD noted, tracheal position midline.      Respiratory: No gasping or shortness of breath noted, no use of accessory muscles noted     Cardiovascular: Extremities show no edema or varicosities     Skin: No rashes or open lesions/ulcers identified on skin. No induration/tightening noted with palpation of skin     Musculoskeletal: Gait is grossly normal     Digits/nails show no clubbing or cyanosis     Exam of muscles/joints/bones shows no gross atrophy and no abnormal/involuntary movements in the head/neckNo asymmetry or masses noted in the head/neck     Stability: no subluxation noted on movement of bilateral upper extremities or head/neck     Strength: 5/5 in B/L upper and lower extremities    ---positive facet loading in cervical spine.     Range of Motion: DEC CERVICAL Rotation bilaterally due ot pain    Sensation: In tact      Cranial nerves 2-12 are grossly intact     Psychiatric: Pt is alert and oriented to time, place and person.            Assessment/Plan   1. Cervical spondylosis  [place] pain management TC      2. Facet arthritis of lumbosacral region  Radiofrequency Ablation      3. Right cervical radiculopathy             · Chronic neck pain with history of cervical spinal surgery (723.1,338.29,V45.89)  (M54.2,G89.28,Z98.890)   · Chronic low back pain (724.2,338.29) (M54.50,G89.29)   · Cervical postlaminectomy syndrome (722.81) (M96.1)   · Lumbar postlaminectomy syndrome (722.83) (M96.1)   · Chronic neck pain (723.1,338.29) (M54.2,G89.29)   · Spondylosis of cervical region without myelopathy or radiculopathy (721.0) (M47.812)     Provider Impressions     1. I have provided the patient with a list of physical therapy exercises to learn and perform to strengthen core, maintain stabilization, and reduce pain. We reviewed the exercises in detail and I encouraged them to  perform them on a regular basis.     2. I would recommend the pt start on Gabapentin to help with nerve related pain. We discussed the risks, benefits, and side effects to this medication including the mechanism of action and the pt understands and agrees.     3. I again reviewed the patients MRI findings in detail, including review of the actual images and provided a detailed explanation of the findings using a spine model.      The pt had an MRI (5-2023) and it showed degenerative marrow signal a the C6/7 level and she is s/p posterior decompression from C5/6 thru C7/T1.      There are disc osteophyte complexes at C2/3 and C3/4 there is small disc protrusion at the C4/5 level.      There is noted canal stenosis worst at the C3/4 level that is mild to moderate.     Her CT scan 2023 as noted several levels of facet hypertrophy.      4. The patient is a candidate for a Cervical facet blocks at the bilateral C4/5, C5/6 with fluoroscopic guidance versus treat neck. I spent time with the patient discussing all of the risks, benefits, and alternatives to this measure. Including but not limited to spinal infection, epidural hematoma/abscess, paralysis, nerve injury, steroid effects, and spinal headache. The patient understands and agrees to proceed.     Pt has failed over 3 months of conservative therapy including heat/ice and massage.     Her last diagnostic block was a medial branch block at C4/5 and C5/6 on 10- and she obtained 90% relief of her pain that lasted for several weeks time. She reports she was able to turn her head with less pain and was able to sleep with less pain.     She underwent diagnostic block #2 on 12- and reported 95% relief of her pain that lasted for 2 weeks as well. She is now a candidate for Cervical medial branch nerve radiofrequency ablation at the bilateral C4/5 and C5/6 levels under fluoroscopic guidance.      I spent time with the patient reviewing their imaging and  discussing the risks benefits and alternatives to the above plan. A total of 30 minutes was spent reviewing the data and greater than 50% of that time was with the patient during the face to face encounter discussing treatment options both surgical, non-surgical, and minimally invasive techniques.    Iban Ashley MD

## 2024-01-11 NOTE — H&P (VIEW-ONLY)
History Of Present Illness  Galina Chao is a 73 y.o. female presenting with   Chief Complaint   Patient presents with    Follow-up       Patient who is RHD follows up after medial branch blocks for chronic neck pain and also chronic low back pain low back. The pt is s/p lumbar surgery x 3 in the 1970s and POSTERIOR Cervical surgery x 1 as well. The pain is constant, worse with activity and better with rest. The pain is sharp, stabbing and shooting to the B/L UE worse on the left side. Denies LE weakness, saddle anesthesia, bowel or bladder incontinence. To manage this pain the patient has attempted Tylenol and reports she takes Hydrocodone rarely. The patients chronic HTN, DLD, GERD, Hypothyroidism, Insomnia are stable.     PAIN SCORE:  8 / 10.     PSHx  1973, 1976, 1989 - Lumbar surgery  1986 - Cervical surgery   -endometrial ablasion  -oophorectomy   -emilia  -Right tibia fx  -PCI x 1  -Bilateral TKA - Dr. 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 Gonzalez (was Dr. Moctezuma)   Surgeon: Dr. Recio     Past Medical History  She has a past medical history of Allergic, Arthritis, COPD (chronic obstructive pulmonary disease) (CMS/HCC), Disease of thyroid gland, Diverticulosis of intestine, part unspecified, without perforation or abscess without bleeding, Emphysema of lung (CMS/HCC), GERD (gastroesophageal reflux disease), Heart murmur, Hypertension, Inflammatory bowel disease, Obesity, unspecified, Personal history of other diseases of the circulatory system, Personal history of other diseases of the circulatory system (10/09/2020), Personal history of other diseases of the circulatory system, Personal history of other diseases of the digestive system, Personal history of other diseases of the digestive system, Personal history of other diseases of the musculoskeletal system and connective tissue, Personal history of other diseases of the  musculoskeletal system and connective tissue, Personal history of other diseases of the musculoskeletal system and connective tissue, Personal history of other diseases of the respiratory system, Personal history of other diseases of the respiratory system, and Personal history of other endocrine, nutritional and metabolic disease.    Surgical History  She has a past surgical history that includes Cholecystectomy (05/24/2016); Other surgical history (06/15/2022); Other surgical history (06/15/2022); Other surgical history (04/27/2018); Other surgical history (04/27/2018); Appendectomy (04/27/2018); Cardiac surgery (10/12/2017); Back surgery (10/12/2017); Cardiac catheterization; Endometrial ablation; and Joint replacement.     Social History  She reports that she quit smoking about 28 years ago. Her smoking use included cigarettes. She started smoking about 61 years ago. She has a 60.00 pack-year smoking history. She has never used smokeless tobacco. She reports current alcohol use. She reports that she does not use drugs.    Family History  Family History   Problem Relation Name Age of Onset    Cancer Mother JEFRY GRANT     Hypertension Mother JEFRY GRANT     Cancer Father ISA GRANT JR     Hypertension Father ISA GRANT JR     Leukemia Father ISA GRANT      Hypertension Sister      Hyperlipidemia Sister      Kidney disease Brother ISA ALMENDAREZDAMIAN ZHOU     Stroke Brother ISA GRANT      Other (ischemic heart disease) Other          Allergies  Ciprofloxacin, Indomethacin, Sulfa (sulfonamide antibiotics), and Nsaids (non-steroidal anti-inflammatory drug)    Review of Systems    All other systems reviewed and negative for any deficits. Pertinent positives and negatives were considered in the medical decision making process.        Physical Exam  /64   Pulse 71   Temp 36.4 °C (97.5 °F)   Resp 18   Wt 80.3 kg (177 lb)     General: Pt appears stated age     Eyes: Conjunctiva non-icteric and lids without  obvious rash or drooping. Pupils are symmetric     ENT: External ears are without deformity or rash. Hearing is grossly intact     Neck: No JVD noted, tracheal position midline.      Respiratory: No gasping or shortness of breath noted, no use of accessory muscles noted     Cardiovascular: Extremities show no edema or varicosities     Skin: No rashes or open lesions/ulcers identified on skin. No induration/tightening noted with palpation of skin     Musculoskeletal: Gait is grossly normal     Digits/nails show no clubbing or cyanosis     Exam of muscles/joints/bones shows no gross atrophy and no abnormal/involuntary movements in the head/neckNo asymmetry or masses noted in the head/neck     Stability: no subluxation noted on movement of bilateral upper extremities or head/neck     Strength: 5/5 in B/L upper and lower extremities    ---positive facet loading in cervical spine.     Range of Motion: DEC CERVICAL Rotation bilaterally due ot pain    Sensation: In tact      Cranial nerves 2-12 are grossly intact     Psychiatric: Pt is alert and oriented to time, place and person.            Assessment/Plan   1. Cervical spondylosis  [place] pain management TC      2. Facet arthritis of lumbosacral region  Radiofrequency Ablation      3. Right cervical radiculopathy             · Chronic neck pain with history of cervical spinal surgery (723.1,338.29,V45.89)  (M54.2,G89.28,Z98.890)   · Chronic low back pain (724.2,338.29) (M54.50,G89.29)   · Cervical postlaminectomy syndrome (722.81) (M96.1)   · Lumbar postlaminectomy syndrome (722.83) (M96.1)   · Chronic neck pain (723.1,338.29) (M54.2,G89.29)   · Spondylosis of cervical region without myelopathy or radiculopathy (721.0) (M47.812)     Provider Impressions     1. I have provided the patient with a list of physical therapy exercises to learn and perform to strengthen core, maintain stabilization, and reduce pain. We reviewed the exercises in detail and I encouraged them to  perform them on a regular basis.     2. I would recommend the pt start on Gabapentin to help with nerve related pain. We discussed the risks, benefits, and side effects to this medication including the mechanism of action and the pt understands and agrees.     3. I again reviewed the patients MRI findings in detail, including review of the actual images and provided a detailed explanation of the findings using a spine model.      The pt had an MRI (5-2023) and it showed degenerative marrow signal a the C6/7 level and she is s/p posterior decompression from C5/6 thru C7/T1.      There are disc osteophyte complexes at C2/3 and C3/4 there is small disc protrusion at the C4/5 level.      There is noted canal stenosis worst at the C3/4 level that is mild to moderate.     Her CT scan 2023 as noted several levels of facet hypertrophy.      4. The patient is a candidate for a Cervical facet blocks at the bilateral C4/5, C5/6 with fluoroscopic guidance versus treat neck. I spent time with the patient discussing all of the risks, benefits, and alternatives to this measure. Including but not limited to spinal infection, epidural hematoma/abscess, paralysis, nerve injury, steroid effects, and spinal headache. The patient understands and agrees to proceed.     Pt has failed over 3 months of conservative therapy including heat/ice and massage.     Her last diagnostic block was a medial branch block at C4/5 and C5/6 on 10- and she obtained 90% relief of her pain that lasted for several weeks time. She reports she was able to turn her head with less pain and was able to sleep with less pain.     She underwent diagnostic block #2 on 12- and reported 95% relief of her pain that lasted for 2 weeks as well. She is now a candidate for Cervical medial branch nerve radiofrequency ablation at the bilateral C4/5 and C5/6 levels under fluoroscopic guidance.      I spent time with the patient reviewing their imaging and  discussing the risks benefits and alternatives to the above plan. A total of 30 minutes was spent reviewing the data and greater than 50% of that time was with the patient during the face to face encounter discussing treatment options both surgical, non-surgical, and minimally invasive techniques.    Iban Ashley MD

## 2024-01-12 NOTE — TELEPHONE ENCOUNTER
I called patient and left a VM.  I let her know that once she has her Cervical RFA she will be given a FUV and at that visit she can discuss the lumbar pain and can request an MRI of the lumbar at that time.

## 2024-01-12 NOTE — TELEPHONE ENCOUNTER
She just saw me yesterday and didn't mention her back at all. Unable to order an MRI without proper documentation. Will need another office visit in the future.

## 2024-01-23 DIAGNOSIS — G89.29 CHRONIC BILATERAL LOW BACK PAIN WITHOUT SCIATICA: ICD-10-CM

## 2024-01-23 DIAGNOSIS — M54.50 CHRONIC BILATERAL LOW BACK PAIN WITHOUT SCIATICA: ICD-10-CM

## 2024-01-23 DIAGNOSIS — E03.9 HYPOTHYROIDISM, UNSPECIFIED TYPE: ICD-10-CM

## 2024-01-23 RX ORDER — HYDROCODONE BITARTRATE AND ACETAMINOPHEN 5; 325 MG/1; MG/1
1 TABLET ORAL 3 TIMES DAILY PRN
Qty: 20 TABLET | Refills: 0 | Status: SHIPPED | OUTPATIENT
Start: 2024-01-23 | End: 2024-03-15 | Stop reason: SDUPTHER

## 2024-01-23 RX ORDER — LEVOTHYROXINE SODIUM 137 UG/1
TABLET ORAL
Qty: 78 TABLET | Refills: 3 | Status: SHIPPED | OUTPATIENT
Start: 2024-01-23

## 2024-02-02 ENCOUNTER — HOSPITAL ENCOUNTER (OUTPATIENT)
Dept: PAIN MEDICINE | Facility: CLINIC | Age: 74
Discharge: HOME | End: 2024-02-02
Payer: COMMERCIAL

## 2024-02-02 ENCOUNTER — HOSPITAL ENCOUNTER (OUTPATIENT)
Dept: RADIOLOGY | Facility: CLINIC | Age: 74
Discharge: HOME | End: 2024-02-02
Payer: COMMERCIAL

## 2024-02-02 VITALS
OXYGEN SATURATION: 97 % | SYSTOLIC BLOOD PRESSURE: 115 MMHG | DIASTOLIC BLOOD PRESSURE: 60 MMHG | TEMPERATURE: 97.2 F | HEART RATE: 71 BPM | RESPIRATION RATE: 15 BRPM

## 2024-02-02 DIAGNOSIS — M47.812 CERVICAL SPONDYLOSIS: ICD-10-CM

## 2024-02-02 DIAGNOSIS — H81.10 BENIGN PAROXYSMAL POSITIONAL VERTIGO, UNSPECIFIED LATERALITY: ICD-10-CM

## 2024-02-02 DIAGNOSIS — M47.817 FACET ARTHRITIS OF LUMBOSACRAL REGION: ICD-10-CM

## 2024-02-02 PROCEDURE — 2500000004 HC RX 250 GENERAL PHARMACY W/ HCPCS (ALT 636 FOR OP/ED)

## 2024-02-02 PROCEDURE — 64634 DESTROY C/TH FACET JNT ADDL: CPT | Mod: RT

## 2024-02-02 PROCEDURE — 77003 FLUOROGUIDE FOR SPINE INJECT: CPT

## 2024-02-02 PROCEDURE — 64633 DESTROY CERV/THOR FACET JNT: CPT | Mod: 50

## 2024-02-02 PROCEDURE — 2500000005 HC RX 250 GENERAL PHARMACY W/O HCPCS

## 2024-02-02 RX ORDER — LIDOCAINE HYDROCHLORIDE 5 MG/ML
INJECTION, SOLUTION INFILTRATION; INTRAVENOUS
Status: COMPLETED
Start: 2024-02-02 | End: 2024-02-02

## 2024-02-02 RX ORDER — ROPIVACAINE HYDROCHLORIDE 5 MG/ML
INJECTION, SOLUTION EPIDURAL; INFILTRATION; PERINEURAL
Status: COMPLETED
Start: 2024-02-02 | End: 2024-02-02

## 2024-02-02 RX ADMIN — LIDOCAINE HYDROCHLORIDE 250 MG: 5 INJECTION, SOLUTION INFILTRATION at 13:35

## 2024-02-02 RX ADMIN — ROPIVACAINE HYDROCHLORIDE 100 MG: 5 INJECTION, SOLUTION EPIDURAL; INFILTRATION; PERINEURAL at 13:35

## 2024-02-02 ASSESSMENT — ENCOUNTER SYMPTOMS
LOSS OF SENSATION IN FEET: 0
OCCASIONAL FEELINGS OF UNSTEADINESS: 1
DEPRESSION: 0

## 2024-02-02 ASSESSMENT — PAIN - FUNCTIONAL ASSESSMENT: PAIN_FUNCTIONAL_ASSESSMENT: 0-10

## 2024-02-02 ASSESSMENT — PAIN SCALES - GENERAL
PAINLEVEL_OUTOF10: 9
PAINLEVEL_OUTOF10: 8

## 2024-02-02 NOTE — OP NOTE
DATE OF PROCEDURE: 2-2-2024    PRE-PROCEDURE DIAGNOSIS: Cervical Spondylosis    POST-PROCEDURE DIAGNOSIS: Cervical Spondylosis    PROCEDURE:    1. Radiofrequency of cervical facet joint medial branch blocks at bilateral C4/5, and C5/6     2. Fluoroscopic guidance for the above procedure    ATTENDING PHYSICIAN: Iban Ashley M.D.    ANESTHESIA: Local    Blood Loss: None    Complications: None    CLINICAL DATA: The patient is a 74yo F presenting with a history of pain in the cervical spine bilaterally.     PROCEDURE: The patient was taken to the Procedure room and informed consent was obtained after discussing the risks, benefits, and alternatives. The patient was placed in the prone position. The skin overlying the cervical spine was prepped with Chloraprep and appropriately draped in sterile fashion. Fluoroscopic guidance was used to chavez the skin for initial needle placement. The skin and the subcutaneous tissues were subsequently anesthetized with 3ml of 0.5% Lidocaine. A 20 g 3 1/2 inch radiofrequency (RF) needles were advanced under fluoroscopic guidance towards the mid points of the articular process of  BILATERAL C4/5 AND C5/6 : along the lateral margin of the vertebral body. After negative aspiration for blood and verification of needle position under fluoroscopy, the impedance was verified to be between 200 to 300 Ohms, sensory stimulation at 50 Hz between 0.3 to 0.6 Volts and motor stimulation at 2 Hz negative at three times the sensory stimulation. 1ml of 1% Lidocaine was injected at each location. Then pulse RF was carried out at 90 degrees C for a duration of 180 sec at each location for a total of 4 injections. Then 4ml of 0.5% Ropivicaine was injected in equally divided doses at the three sites. Then the needles were removed, hemostasis obtained and needle entry sites covered with a dressing.    The patient tolerated the procedure well without any problems and vital signs remained stable throughout.  The patient was then taken to the Recovery Area, allowed to recover and discharged to home in stable condition.

## 2024-02-03 RX ORDER — EPLERENONE 25 MG/1
25 TABLET, FILM COATED ORAL DAILY
Qty: 90 TABLET | Refills: 3 | Status: SHIPPED | OUTPATIENT
Start: 2024-02-03 | End: 2024-03-04 | Stop reason: SDUPTHER

## 2024-02-08 ENCOUNTER — APPOINTMENT (OUTPATIENT)
Dept: PAIN MEDICINE | Facility: CLINIC | Age: 74
End: 2024-02-08
Payer: COMMERCIAL

## 2024-02-16 ENCOUNTER — APPOINTMENT (OUTPATIENT)
Dept: NEUROSURGERY | Facility: CLINIC | Age: 74
End: 2024-02-16
Payer: MEDICARE

## 2024-03-04 DIAGNOSIS — H81.10 BENIGN PAROXYSMAL POSITIONAL VERTIGO, UNSPECIFIED LATERALITY: ICD-10-CM

## 2024-03-04 DIAGNOSIS — I10 ESSENTIAL HYPERTENSION: ICD-10-CM

## 2024-03-04 RX ORDER — AMLODIPINE BESYLATE 5 MG/1
5 TABLET ORAL DAILY
Qty: 90 TABLET | Refills: 3 | Status: SHIPPED | OUTPATIENT
Start: 2024-03-04 | End: 2025-02-27

## 2024-03-04 RX ORDER — EPLERENONE 25 MG/1
25 TABLET, FILM COATED ORAL DAILY
Qty: 90 TABLET | Refills: 3 | Status: SHIPPED | OUTPATIENT
Start: 2024-03-04

## 2024-03-13 ENCOUNTER — APPOINTMENT (OUTPATIENT)
Dept: PAIN MEDICINE | Facility: CLINIC | Age: 74
End: 2024-03-13
Payer: COMMERCIAL

## 2024-03-15 DIAGNOSIS — M54.50 CHRONIC BILATERAL LOW BACK PAIN WITHOUT SCIATICA: ICD-10-CM

## 2024-03-15 DIAGNOSIS — G89.29 CHRONIC BILATERAL LOW BACK PAIN WITHOUT SCIATICA: ICD-10-CM

## 2024-03-15 RX ORDER — HYDROCODONE BITARTRATE AND ACETAMINOPHEN 5; 325 MG/1; MG/1
1 TABLET ORAL 3 TIMES DAILY PRN
Qty: 20 TABLET | Refills: 0 | Status: SHIPPED | OUTPATIENT
Start: 2024-03-15 | End: 2024-06-10 | Stop reason: SDUPTHER

## 2024-05-13 ENCOUNTER — OFFICE VISIT (OUTPATIENT)
Dept: PAIN MEDICINE | Facility: CLINIC | Age: 74
End: 2024-05-13
Payer: COMMERCIAL

## 2024-05-13 VITALS
SYSTOLIC BLOOD PRESSURE: 176 MMHG | WEIGHT: 177 LBS | RESPIRATION RATE: 15 BRPM | HEART RATE: 67 BPM | BODY MASS INDEX: 27.72 KG/M2 | DIASTOLIC BLOOD PRESSURE: 86 MMHG | OXYGEN SATURATION: 97 % | TEMPERATURE: 97.7 F

## 2024-05-13 DIAGNOSIS — M54.2 CHRONIC NECK PAIN WITH HISTORY OF CERVICAL SPINAL SURGERY: Primary | ICD-10-CM

## 2024-05-13 DIAGNOSIS — M47.817 FACET ARTHRITIS OF LUMBOSACRAL REGION: ICD-10-CM

## 2024-05-13 DIAGNOSIS — M96.1 CERVICAL POSTLAMINECTOMY SYNDROME: ICD-10-CM

## 2024-05-13 DIAGNOSIS — M47.812 CERVICAL SPONDYLOSIS: ICD-10-CM

## 2024-05-13 DIAGNOSIS — M54.12 RIGHT CERVICAL RADICULOPATHY: ICD-10-CM

## 2024-05-13 DIAGNOSIS — G89.28 CHRONIC NECK PAIN WITH HISTORY OF CERVICAL SPINAL SURGERY: Primary | ICD-10-CM

## 2024-05-13 DIAGNOSIS — Z98.890 CHRONIC NECK PAIN WITH HISTORY OF CERVICAL SPINAL SURGERY: Primary | ICD-10-CM

## 2024-05-13 DIAGNOSIS — M54.12 CERVICAL RADICULITIS: ICD-10-CM

## 2024-05-13 PROCEDURE — 99214 OFFICE O/P EST MOD 30 MIN: CPT | Performed by: ANESTHESIOLOGY

## 2024-05-13 RX ORDER — GABAPENTIN 300 MG/1
300 CAPSULE ORAL 2 TIMES DAILY
Qty: 180 CAPSULE | Refills: 3 | Status: SHIPPED | OUTPATIENT
Start: 2024-05-13 | End: 2024-06-10 | Stop reason: SDUPTHER

## 2024-05-13 ASSESSMENT — ENCOUNTER SYMPTOMS
LOSS OF SENSATION IN FEET: 0
OCCASIONAL FEELINGS OF UNSTEADINESS: 1
DEPRESSION: 0

## 2024-05-13 ASSESSMENT — PAIN SCALES - GENERAL
PAINLEVEL: 9
PAINLEVEL_OUTOF10: 9

## 2024-05-13 ASSESSMENT — PAIN DESCRIPTION - DESCRIPTORS: DESCRIPTORS: SHARP;ACHING

## 2024-05-13 ASSESSMENT — PAIN - FUNCTIONAL ASSESSMENT: PAIN_FUNCTIONAL_ASSESSMENT: 0-10

## 2024-05-13 NOTE — PROGRESS NOTES
History Of Present Illness  Galina Chao is a 73 y.o. female presenting with   Chief Complaint   Patient presents with    Follow-up       Patient who is RHD follows up after medial branch blocks for chronic neck pain and also chronic low back pain low back. The pt is s/p lumbar surgery x 3 in the 1970s and POSTERIOR Cervical surgery x 1 as well. The pain is constant, worse with activity and better with rest. The pain is sharp, stabbing and shooting to the B/L UE worse on the left side. Denies LE weakness, saddle anesthesia, bowel or bladder incontinence. To manage this pain the patient has attempted Tylenol and reports she takes Hydrocodone rarely. The patients chronic HTN, DLD, GERD, Hypothyroidism, Insomnia are stable.     PAIN SCORE:  8 / 10.     PSHx  1973, 1976, 1989 - Lumbar surgery  1986 - Cervical surgery   -endometrial ablasion  -oophorectomy   -emilia  -Right tibia fx  -PCI x 1  -Bilateral TKA - Dr. 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 Gonzalez (was Dr. Moctezuma)   Surgeon: Dr. Recio     Past Medical History  She has a past medical history of Allergic, Arthritis, COPD (chronic obstructive pulmonary disease) (Multi), Disease of thyroid gland, Diverticulosis of intestine, part unspecified, without perforation or abscess without bleeding, Emphysema of lung (Multi), GERD (gastroesophageal reflux disease), Heart murmur, Hypertension, Inflammatory bowel disease, Obesity, unspecified, Personal history of other diseases of the circulatory system, Personal history of other diseases of the circulatory system (10/09/2020), Personal history of other diseases of the circulatory system, Personal history of other diseases of the digestive system, Personal history of other diseases of the digestive system, Personal history of other diseases of the musculoskeletal system and connective tissue, Personal history of other diseases of the  musculoskeletal system and connective tissue, Personal history of other diseases of the musculoskeletal system and connective tissue, Personal history of other diseases of the respiratory system, Personal history of other diseases of the respiratory system, and Personal history of other endocrine, nutritional and metabolic disease.    Surgical History  She has a past surgical history that includes Cholecystectomy (05/24/2016); Other surgical history (06/15/2022); Other surgical history (06/15/2022); Other surgical history (04/27/2018); Other surgical history (04/27/2018); Appendectomy (04/27/2018); Cardiac surgery (10/12/2017); Back surgery (10/12/2017); Cardiac catheterization; Endometrial ablation; and Joint replacement.     Social History  She reports that she quit smoking about 28 years ago. Her smoking use included cigarettes. She started smoking about 61 years ago. She has a 66 pack-year smoking history. She has never used smokeless tobacco. She reports current alcohol use. She reports that she does not use drugs.    Family History  Family History   Problem Relation Name Age of Onset    Cancer Mother JEFRY GRANT     Hypertension Mother JEFRY GRANT     Cancer Father ISA GRANT JR     Hypertension Father ISA GRANT JR     Leukemia Father ISA GRANT JR     Hypertension Sister      Hyperlipidemia Sister      Kidney disease Brother ISA GRANT      Stroke Brother ISA GRANT      Other (ischemic heart disease) Other          Allergies  Ciprofloxacin, Indomethacin, Sulfa (sulfonamide antibiotics), and Nsaids (non-steroidal anti-inflammatory drug)    Review of Systems    All other systems reviewed and negative for any deficits. Pertinent positives and negatives were considered in the medical decision making process.        Physical Exam  /86   Pulse 67   Temp 36.5 °C (97.7 °F)   Resp 15   Wt 80.3 kg (177 lb)   SpO2 97%     General: Pt appears stated age     Eyes: Conjunctiva non-icteric and  lids without obvious rash or drooping. Pupils are symmetric     ENT: External ears are without deformity or rash. Hearing is grossly intact     Neck: No JVD noted, tracheal position midline.      Respiratory: No gasping or shortness of breath noted, no use of accessory muscles noted     Cardiovascular: Extremities show no edema or varicosities     Musculoskeletal: Gait is grossly normal     Digits/nails show no clubbing or cyanosis     Exam of muscles/joints/bones shows no gross atrophy and no abnormal/involuntary movements in the head/neckNo asymmetry or masses noted in the head/neck     Stability: no subluxation noted on movement of bilateral upper extremities or head/neck     Strength: 5/5 in B/L upper and lower extremities    ---positive facet loading in cervical spine.     Range of Motion: DEC CERVICAL Rotation bilaterally due ot pain    Sensation: In tact      Cranial nerves 2-12 are grossly intact     Psychiatric: Pt is alert and oriented to time, place and person.            Assessment/Plan   1. Chronic neck pain with history of cervical spinal surgery        2. Cervical radiculitis        3. Cervical spondylosis               · Chronic neck pain with history of cervical spinal surgery (723.1,338.29,V45.89)  (M54.2,G89.28,Z98.890)   · Chronic low back pain (724.2,338.29) (M54.50,G89.29)   · Cervical postlaminectomy syndrome (722.81) (M96.1)   · Lumbar postlaminectomy syndrome (722.83) (M96.1)   · Chronic neck pain (723.1,338.29) (M54.2,G89.29)   · Spondylosis of cervical region without myelopathy or radiculopathy (721.0) (M47.812)     Provider Impressions     1. I have provided the patient with a list of physical therapy exercises to learn and perform to strengthen core, maintain stabilization, and reduce pain. We reviewed the exercises in detail and I encouraged them to perform them on a regular basis.     2. I would recommend the pt start on Gabapentin to help with nerve related pain. We discussed the  risks, benefits, and side effects to this medication including the mechanism of action and the pt understands and agrees.     3. I again reviewed the patients MRI findings in detail, including review of the actual images and provided a detailed explanation of the findings using a spine model.      The pt had an MRI (5-2023) and it showed degenerative marrow signal a the C6/7 level and she is s/p posterior decompression from C5/6 thru C7/T1.      There are disc osteophyte complexes at C2/3 and C3/4 there is small disc protrusion at the C4/5 level.      There is noted canal stenosis worst at the C3/4 level that is mild to moderate.     Her CT scan 2023 as noted several levels of facet hypertrophy.      4. The patient is a candidate for a Cervical facet blocks at the bilateral C4/5, C5/6 with fluoroscopic guidance versus treat neck. I spent time with the patient discussing all of the risks, benefits, and alternatives to this measure. Including but not limited to spinal infection, epidural hematoma/abscess, paralysis, nerve injury, steroid effects, and spinal headache. The patient understands and agrees to proceed.     Pt has failed over 3 months of conservative therapy including heat/ice and massage.     Her last diagnostic block was a medial branch block at C4/5 and C5/6 on 10- and she obtained 90% relief of her pain that lasted for several weeks time. She reports she was able to turn her head with less pain and was able to sleep with less pain.     She underwent diagnostic Cervical RFA on 2-2-2024  with 80% relief of her pain. She underwent a dx block #2 on 12- and reported 95% relief of her pain that lasted for 2 weeks as well. She is now a candidate for Cervical medial branch nerve radiofrequency ablation at the bilateral C4/5 and C5/6 levels under fluoroscopic guidance.      I spent time with the patient reviewing their imaging and discussing the risks benefits and alternatives to the above plan. A  total of 30 minutes was spent reviewing the data and greater than 50% of that time was with the patient during the face to face encounter discussing treatment options both surgical, non-surgical, and minimally invasive techniques.    Iban Ashley MD

## 2024-05-14 DIAGNOSIS — I10 HYPERTENSION, UNSPECIFIED TYPE: ICD-10-CM

## 2024-05-14 RX ORDER — ATENOLOL 25 MG/1
25 TABLET ORAL DAILY
Qty: 90 TABLET | Refills: 3 | Status: SHIPPED | OUTPATIENT
Start: 2024-05-14

## 2024-05-16 PROBLEM — I83.90 VARICOSE VEINS OF LOWER EXTREMITY: Status: ACTIVE | Noted: 2023-08-16

## 2024-05-16 PROBLEM — R06.09 DYSPNEA ON EXERTION: Status: ACTIVE | Noted: 2024-05-16

## 2024-05-16 PROBLEM — J44.9 MODERATE COPD (CHRONIC OBSTRUCTIVE PULMONARY DISEASE) (MULTI): Status: ACTIVE | Noted: 2019-12-18

## 2024-05-16 PROBLEM — E66.9 ADULT-ONSET OBESITY: Status: ACTIVE | Noted: 2024-05-16

## 2024-05-16 PROBLEM — M96.1 CERVICAL POSTLAMINECTOMY SYNDROME: Status: ACTIVE | Noted: 2023-05-15

## 2024-05-16 PROBLEM — U07.1 DISEASE DUE TO SEVERE ACUTE RESPIRATORY SYNDROME CORONAVIRUS 2 (SARS-COV-2): Status: ACTIVE | Noted: 2024-05-16

## 2024-05-16 PROBLEM — M96.1 LUMBAR POSTLAMINECTOMY SYNDROME: Status: ACTIVE | Noted: 2024-05-16

## 2024-05-21 DIAGNOSIS — K21.9 GASTROESOPHAGEAL REFLUX DISEASE WITHOUT ESOPHAGITIS: ICD-10-CM

## 2024-05-21 RX ORDER — LANSOPRAZOLE 30 MG/1
30 CAPSULE, DELAYED RELEASE ORAL
Qty: 90 CAPSULE | Refills: 3 | Status: SHIPPED | OUTPATIENT
Start: 2024-05-21

## 2024-05-28 ENCOUNTER — OFFICE VISIT (OUTPATIENT)
Dept: NEUROSURGERY | Facility: CLINIC | Age: 74
End: 2024-05-28
Payer: COMMERCIAL

## 2024-05-28 VITALS
HEART RATE: 67 BPM | RESPIRATION RATE: 16 BRPM | DIASTOLIC BLOOD PRESSURE: 79 MMHG | TEMPERATURE: 98.3 F | SYSTOLIC BLOOD PRESSURE: 157 MMHG | WEIGHT: 177 LBS | BODY MASS INDEX: 27.78 KG/M2 | OXYGEN SATURATION: 97 % | HEIGHT: 67 IN

## 2024-05-28 DIAGNOSIS — M54.12 CERVICAL RADICULOPATHY: ICD-10-CM

## 2024-05-28 DIAGNOSIS — R26.89 IMBALANCE: Primary | ICD-10-CM

## 2024-05-28 DIAGNOSIS — Z98.890 HX OF EXCISION OF LAMINA OF CERVICAL VERTEBRA FOR DECOMPRESSION OF SPINAL CORD: ICD-10-CM

## 2024-05-28 PROCEDURE — 1159F MED LIST DOCD IN RCRD: CPT | Performed by: NURSE PRACTITIONER

## 2024-05-28 PROCEDURE — 99215 OFFICE O/P EST HI 40 MIN: CPT | Performed by: NURSE PRACTITIONER

## 2024-05-28 PROCEDURE — 3078F DIAST BP <80 MM HG: CPT | Performed by: NURSE PRACTITIONER

## 2024-05-28 PROCEDURE — 3077F SYST BP >= 140 MM HG: CPT | Performed by: NURSE PRACTITIONER

## 2024-05-28 PROCEDURE — 1160F RVW MEDS BY RX/DR IN RCRD: CPT | Performed by: NURSE PRACTITIONER

## 2024-05-28 PROCEDURE — 1125F AMNT PAIN NOTED PAIN PRSNT: CPT | Performed by: NURSE PRACTITIONER

## 2024-05-28 RX ORDER — DIAZEPAM 5 MG/1
TABLET ORAL
Qty: 2 TABLET | Refills: 0 | Status: SHIPPED | OUTPATIENT
Start: 2024-05-28 | End: 2024-06-10 | Stop reason: WASHOUT

## 2024-05-28 ASSESSMENT — PAIN DESCRIPTION - DESCRIPTORS: DESCRIPTORS: ACHING

## 2024-05-28 ASSESSMENT — PAIN SCALES - GENERAL
PAINLEVEL: 8
PAINLEVEL_OUTOF10: 8

## 2024-05-28 ASSESSMENT — PAIN - FUNCTIONAL ASSESSMENT: PAIN_FUNCTIONAL_ASSESSMENT: 0-10

## 2024-05-28 ASSESSMENT — ENCOUNTER SYMPTOMS
DEPRESSION: 0
OCCASIONAL FEELINGS OF UNSTEADINESS: 1
LOSS OF SENSATION IN FEET: 0

## 2024-05-28 NOTE — PROGRESS NOTES
"Galina Chao is here today in follow up for neck pain.      To review, she was evaluated on 07/21/2023, by Dr. Jerome Recio. She  reported significant neck pain and BUE hand paresthesia. On exam, she had known + Rolando's bilaterally, no clonus. Orders were written for EMG / NCS.    Today, she continues with same pain, BUE hand paresthesia, with imbalance she reports is worsening. She had EMG / NCS, which was negative for cervical / nerve injury    TREATMENTS:  Pain management: cervical MBB; cervical RFA 02/02/24: 80% relief with MBB - none with the RFA. She has another one scheduled in August 2024  PT: None  Home Exercise Program: None  Posterior C5 - T1 laminectomy in 1986  L4 laminectomy by Dr Munoz at [facility]   Additional Lumbar surgery x 1 Dr Gant    SMOKER: Former (remote)  ANTICOAGULANT USE: YES: Daily baby ASA    ROS x 10 is, otherwise, negative unless documented in HPI above    /79   Pulse 67   Temp 36.8 °C (98.3 °F)   Resp 16   Ht 1.702 m (5' 7\")   Wt 80.3 kg (177 lb)   LMP 09/30/2023   SpO2 97%   BMI 27.72 kg/m²     On Exam: Appears comfortable  A&O x 4, speech clear / fluent  Cervical Spine ROM: mild limitation with lateral flexion / rotation  Respirations even / unlabored  Abdomen without distension  MOTOR: Mild motor weakness in RUE; 5 / 5 in LUE  SENSORY: Decreased sensory in RIGHT C6, C7 dermatomes; otherwise, SILT in BUE  Reflexes: 1+ BUE, Right Rolando's present. Neg on left  Gait: normal, TANDEM GAIT is unsteady    Galina Chao has symptoms / signs of cervical myelopathy. We reviewed cervical spine MRI images from 5/18/2023, with no cervical cord impingement, but she feels her imbalance and weakness are progressing. We discussed rationale for MRI Cervical Spine to evaluate for cervical cord compression above or below prior decompression, and or cervical nerve root compression to explain right sensory / motor deficits on exam. Discussed PT for HEP. She is agreeable with " plan and will follow up to review MRI findings to decide surgical vs non-surgical management. She agrees to continue with Pain Management for injections as well.  Glo Villalta, APRN-CNP

## 2024-06-03 NOTE — PROGRESS NOTES
Subjective   Galina Chao is a 74 y.o. female.    Chief Complaint:  Follow-up coronary artery disease, hypertension, hyperlipidemia.    HPI    Over the past year she has had no exacerbations of her coronary artery disease.  Denies anginal symptoms.  No increased dyspnea with exertion.  Trying to lose some weight and has been struggling with weight loss.  Blood pressures have been well-controlled.  No problems with her medications    Cardiac catheterization in July 2019 demonstrated mild coronary artery disease with a patent stent in the right coronary artery. There were 30% stenoses in her vessels.     In 2016 she was found to have a positive calcium score of 1011. Risk factors for coronary artery disease include hypertension and heavy smoking history although she did quit at 41. She has a positive family history of heart disease in that her brother had a myocardial infarction.       In 2016 she underwent cardiac catheterization angioplasty and stent placement of a right coronary artery stenosis. The anterior descending had mild disease. The right coronary artery had a 99% stenosis. Renal arteries were normal.     Other medical problems include history of degenerative arthritis and hypertension. There is a history of hypothyroidism. She's had a cholecystectomy. She's had bilateral knee replacements.     She has had a severe reaction to spironolactone.     Allergies  Medication    · Cipro   Recorded By: Iris Osorio; 4/24/2014 4:38:26 PM   · Indocin   Recorded By: Iris Osorio; 4/24/2014 4:38:26 PM   · sulfa   Recorded By: Iris Osorio; 4/24/2014 4:38:26 PM     Family History  Mother    · Family history of hypertension (V17.49) (Z82.49)   · Family history of malignant neoplasm (V16.9) (Z80.9)  Father    · Family history of hypertension (V17.49) (Z82.49)   · Family history of leukemia (V16.6) (Z80.6)   · Family history of malignant neoplasm (V16.9) (Z80.9)  Sister    · Family history of hypertension (V17.49)  (Z82.49)   · Family history of High cholesterol  Brother    · Family history of cerebrovascular accident (CVA) (V17.1) (Z82.3)  Multiple Family Members    · Family history of ischemic heart disease (V17.3) (Z82.49)     Social History  Problems    · Daily caffeinated coffee consumption   · Former smoker (V15.82) (Z87.891)   · quit smoking 23 years ago   · Lives with family   ·    · No alcohol use    Review of Systems   Constitutional: Positive for malaise/fatigue.   Cardiovascular:  Positive for dyspnea on exertion.   Musculoskeletal:  Positive for arthritis and joint pain.   All other systems reviewed and are negative.      Current Outpatient Medications   Medication Sig Dispense Refill    albuterol 90 mcg/actuation inhaler Inhale 2 puffs every 4 hours if needed for shortness of breath.      amLODIPine (Norvasc) 5 mg tablet Take 1 tablet (5 mg) by mouth once daily. 90 tablet 3    aspirin 81 mg EC tablet Take 1 tablet (81 mg) by mouth once daily.      atenolol (Tenormin) 25 mg tablet Take 1 tablet (25 mg) by mouth once daily. 90 tablet 3    atorvastatin (Lipitor) 20 mg tablet TAKE 1 TABLET DAILY 90 tablet 3    biotin 10 mg tablet Take 2 tablets (20 mg) by mouth once daily.      calcium carbonate (CALCIUM 600 ORAL) 2 times a day.      cholecalciferol (Vitamin D-3) 50 mcg (2,000 unit) capsule Take 1 capsule (50 mcg) by mouth early in the morning..      dexAMETHasone 0.5 mg/5 mL elixir Take every 6 hours      diazePAM (Valium) 5 mg tablet Take one tablet ONE HOUR BEFORE THE MRI. Then take another tablet on arrival to MRI facility 2 tablet 0    eplerenone (Inspra) 25 mg tablet Take 1 tablet (25 mg) by mouth once daily. 90 tablet 3    gabapentin (Neurontin) 300 mg capsule Take 1 capsule (300 mg) by mouth 2 times a day. 180 capsule 3    HYDROcodone-acetaminophen (Norco) 5-325 mg tablet Take 1 tablet by mouth 3 times a day as needed for severe pain (7 - 10). 20 tablet 0    hydroxychloroquine (Plaquenil) 200 mg tablet  "Take 2 tablets (400 mg) by mouth once daily.      Incruse Ellipta 62.5 mcg/actuation inhalation Inhale 1 puff (62.5 mcg) once daily. 90 capsule 3    lansoprazole (Prevacid) 30 mg DR capsule TAKE 1 CAPSULE BY MOUTH EVERY  MORNING 90 capsule 3    levothyroxine (Synthroid, Levoxyl) 137 mcg tablet TAKE 1 TABLET BY MOUTH 6 DAYS  PER WEEK 78 tablet 3    minoxidil (Rogaine) 2 % external solution once daily at bedtime. Apply as directed      multivitamin-iron-minerals-folic acid (Multivitamin 50 Plus) tablet Take 1 tablet by mouth once daily.      nortriptyline (Pamelor) 25 mg capsule Take 2 capsules (50 mg) by mouth once daily. 180 capsule 3    zinc gluconate 50 mg tablet Take 1 tablet (50 mg of elemental zinc) by mouth once daily.      minoxidil (Loniten) 2.5 mg tablet Take 0.5 tablets (1.25 mg) by mouth once daily. 45 tablet 3    zolpidem (Ambien) 10 mg tablet Take 1 tablet (10 mg) by mouth once daily at bedtime. 90 tablet 1     No current facility-administered medications for this visit.        Visit Vitals  /74 (BP Location: Right arm)   Pulse 82   Ht 1.702 m (5' 7\")   Wt 83.5 kg (184 lb)   LMP 09/30/2023   SpO2 95%   BMI 28.82 kg/m²   OB Status Postmenopausal   Smoking Status Former   BSA 1.99 m²        Objective     Constitutional:       Appearance: Not in distress.   Neck:      Vascular: JVD normal.   Pulmonary:      Breath sounds: Normal breath sounds.   Cardiovascular:      Normal rate. Regular rhythm. Normal S1. Normal S2.       Murmurs: There is no murmur.      No gallop.    Pulses:     Intact distal pulses.   Edema:     Peripheral edema absent.   Abdominal:      General: There is no distension.      Palpations: Abdomen is soft.   Neurological:      Mental Status: Alert.           Assessment:    1.  Coronary disease.  No anginal symptoms.  Latest stress thallium study was in 2022.  She has had a stent placed in the right coronary artery.    2.  Hyperlipidemia.  Latest LDL is 56.  Will be getting labs " through her primary care physician.    3.  Hypertension.  Blood pressures are well-controlled.    4.  Alopecia.  Will add low-dose minoxidil 1.25 mg daily.

## 2024-06-04 ENCOUNTER — OFFICE VISIT (OUTPATIENT)
Dept: CARDIOLOGY | Facility: CLINIC | Age: 74
End: 2024-06-04
Payer: COMMERCIAL

## 2024-06-04 VITALS
DIASTOLIC BLOOD PRESSURE: 74 MMHG | HEART RATE: 82 BPM | OXYGEN SATURATION: 95 % | HEIGHT: 67 IN | WEIGHT: 184 LBS | SYSTOLIC BLOOD PRESSURE: 118 MMHG | BODY MASS INDEX: 28.88 KG/M2

## 2024-06-04 DIAGNOSIS — I10 ESSENTIAL HYPERTENSION: Primary | ICD-10-CM

## 2024-06-04 PROCEDURE — 3074F SYST BP LT 130 MM HG: CPT | Performed by: INTERNAL MEDICINE

## 2024-06-04 PROCEDURE — 1036F TOBACCO NON-USER: CPT | Performed by: INTERNAL MEDICINE

## 2024-06-04 PROCEDURE — 3078F DIAST BP <80 MM HG: CPT | Performed by: INTERNAL MEDICINE

## 2024-06-04 PROCEDURE — 99213 OFFICE O/P EST LOW 20 MIN: CPT | Performed by: INTERNAL MEDICINE

## 2024-06-04 PROCEDURE — 1159F MED LIST DOCD IN RCRD: CPT | Performed by: INTERNAL MEDICINE

## 2024-06-04 RX ORDER — MINOXIDIL 2.5 MG/1
1.25 TABLET ORAL DAILY
Qty: 45 TABLET | Refills: 3 | Status: SHIPPED | OUTPATIENT
Start: 2024-06-04 | End: 2025-06-04

## 2024-06-04 ASSESSMENT — ENCOUNTER SYMPTOMS: DYSPNEA ON EXERTION: 1

## 2024-06-05 DIAGNOSIS — F51.01 PRIMARY INSOMNIA: ICD-10-CM

## 2024-06-06 RX ORDER — ZOLPIDEM TARTRATE 10 MG/1
10 TABLET ORAL NIGHTLY
Qty: 90 TABLET | Refills: 1 | Status: SHIPPED | OUTPATIENT
Start: 2024-06-06

## 2024-06-10 ENCOUNTER — TELEPHONE (OUTPATIENT)
Dept: NEUROSURGERY | Facility: CLINIC | Age: 74
End: 2024-06-10

## 2024-06-10 ENCOUNTER — OFFICE VISIT (OUTPATIENT)
Dept: PRIMARY CARE | Facility: CLINIC | Age: 74
End: 2024-06-10
Payer: MEDICARE

## 2024-06-10 VITALS
BODY MASS INDEX: 28.98 KG/M2 | SYSTOLIC BLOOD PRESSURE: 130 MMHG | HEART RATE: 63 BPM | OXYGEN SATURATION: 100 % | WEIGHT: 185 LBS | DIASTOLIC BLOOD PRESSURE: 72 MMHG

## 2024-06-10 DIAGNOSIS — M35.00 SICCA SYNDROME (MULTI): ICD-10-CM

## 2024-06-10 DIAGNOSIS — Z98.890 HX OF EXCISION OF LAMINA OF CERVICAL VERTEBRA FOR DECOMPRESSION OF SPINAL CORD: ICD-10-CM

## 2024-06-10 DIAGNOSIS — G99.2 MYELOPATHY CONCURRENT WITH AND DUE TO SPINAL STENOSIS OF CERVICAL REGION (MULTI): ICD-10-CM

## 2024-06-10 DIAGNOSIS — G89.29 CHRONIC BILATERAL LOW BACK PAIN WITHOUT SCIATICA: ICD-10-CM

## 2024-06-10 DIAGNOSIS — M48.02 MYELOPATHY CONCURRENT WITH AND DUE TO SPINAL STENOSIS OF CERVICAL REGION (MULTI): ICD-10-CM

## 2024-06-10 DIAGNOSIS — R73.01 IMPAIRED FASTING BLOOD SUGAR: ICD-10-CM

## 2024-06-10 DIAGNOSIS — M54.12 RIGHT CERVICAL RADICULOPATHY: ICD-10-CM

## 2024-06-10 DIAGNOSIS — F32.A DEPRESSION, UNSPECIFIED DEPRESSION TYPE: ICD-10-CM

## 2024-06-10 DIAGNOSIS — M54.50 CHRONIC BILATERAL LOW BACK PAIN WITHOUT SCIATICA: ICD-10-CM

## 2024-06-10 DIAGNOSIS — M54.12 CERVICAL RADICULOPATHY: ICD-10-CM

## 2024-06-10 DIAGNOSIS — R26.89 IMBALANCE: ICD-10-CM

## 2024-06-10 DIAGNOSIS — Z79.899 CONTROLLED SUBSTANCE AGREEMENT SIGNED: ICD-10-CM

## 2024-06-10 LAB
ALBUMIN SERPL BCP-MCNC: 4.4 G/DL (ref 3.4–5)
ALP SERPL-CCNC: 80 U/L (ref 33–136)
ALT SERPL W P-5'-P-CCNC: 11 U/L (ref 7–45)
ANION GAP SERPL CALC-SCNC: 15 MMOL/L (ref 10–20)
AST SERPL W P-5'-P-CCNC: 21 U/L (ref 9–39)
BASOPHILS # BLD AUTO: 0.08 X10*3/UL (ref 0–0.1)
BASOPHILS NFR BLD AUTO: 1.3 %
BILIRUB SERPL-MCNC: 0.4 MG/DL (ref 0–1.2)
BUN SERPL-MCNC: 18 MG/DL (ref 6–23)
CALCIUM SERPL-MCNC: 9.9 MG/DL (ref 8.6–10.6)
CHLORIDE SERPL-SCNC: 104 MMOL/L (ref 98–107)
CO2 SERPL-SCNC: 26 MMOL/L (ref 21–32)
CREAT SERPL-MCNC: 0.67 MG/DL (ref 0.5–1.05)
EGFRCR SERPLBLD CKD-EPI 2021: >90 ML/MIN/1.73M*2
EOSINOPHIL # BLD AUTO: 0.43 X10*3/UL (ref 0–0.4)
EOSINOPHIL NFR BLD AUTO: 6.9 %
ERYTHROCYTE [DISTWIDTH] IN BLOOD BY AUTOMATED COUNT: 12.7 % (ref 11.5–14.5)
EST. AVERAGE GLUCOSE BLD GHB EST-MCNC: 100 MG/DL
GLUCOSE SERPL-MCNC: 81 MG/DL (ref 74–99)
HBA1C MFR BLD: 5.1 %
HCT VFR BLD AUTO: 40.4 % (ref 36–46)
HGB BLD-MCNC: 12.9 G/DL (ref 12–16)
IMM GRANULOCYTES # BLD AUTO: 0.01 X10*3/UL (ref 0–0.5)
IMM GRANULOCYTES NFR BLD AUTO: 0.2 % (ref 0–0.9)
LYMPHOCYTES # BLD AUTO: 1.71 X10*3/UL (ref 0.8–3)
LYMPHOCYTES NFR BLD AUTO: 27.5 %
MCH RBC QN AUTO: 30.7 PG (ref 26–34)
MCHC RBC AUTO-ENTMCNC: 31.9 G/DL (ref 32–36)
MCV RBC AUTO: 96 FL (ref 80–100)
MONOCYTES # BLD AUTO: 0.69 X10*3/UL (ref 0.05–0.8)
MONOCYTES NFR BLD AUTO: 11.1 %
NEUTROPHILS # BLD AUTO: 3.29 X10*3/UL (ref 1.6–5.5)
NEUTROPHILS NFR BLD AUTO: 53 %
NRBC BLD-RTO: 0 /100 WBCS (ref 0–0)
PLATELET # BLD AUTO: 278 X10*3/UL (ref 150–450)
POTASSIUM SERPL-SCNC: 4.7 MMOL/L (ref 3.5–5.3)
PROT SERPL-MCNC: 6.4 G/DL (ref 6.4–8.2)
RBC # BLD AUTO: 4.2 X10*6/UL (ref 4–5.2)
SODIUM SERPL-SCNC: 140 MMOL/L (ref 136–145)
TSH SERPL-ACNC: 1.47 MIU/L (ref 0.44–3.98)
WBC # BLD AUTO: 6.2 X10*3/UL (ref 4.4–11.3)

## 2024-06-10 PROCEDURE — 80368 SEDATIVE HYPNOTICS: CPT

## 2024-06-10 PROCEDURE — 80373 DRUG SCREENING TRAMADOL: CPT

## 2024-06-10 PROCEDURE — 3075F SYST BP GE 130 - 139MM HG: CPT | Performed by: STUDENT IN AN ORGANIZED HEALTH CARE EDUCATION/TRAINING PROGRAM

## 2024-06-10 PROCEDURE — 85025 COMPLETE CBC W/AUTO DIFF WBC: CPT

## 2024-06-10 PROCEDURE — 83036 HEMOGLOBIN GLYCOSYLATED A1C: CPT

## 2024-06-10 PROCEDURE — 80307 DRUG TEST PRSMV CHEM ANLYZR: CPT

## 2024-06-10 PROCEDURE — 80361 OPIATES 1 OR MORE: CPT

## 2024-06-10 PROCEDURE — 80346 BENZODIAZEPINES1-12: CPT

## 2024-06-10 PROCEDURE — 82570 ASSAY OF URINE CREATININE: CPT

## 2024-06-10 PROCEDURE — 80358 DRUG SCREENING METHADONE: CPT

## 2024-06-10 PROCEDURE — 1036F TOBACCO NON-USER: CPT | Performed by: STUDENT IN AN ORGANIZED HEALTH CARE EDUCATION/TRAINING PROGRAM

## 2024-06-10 PROCEDURE — 84443 ASSAY THYROID STIM HORMONE: CPT

## 2024-06-10 PROCEDURE — 99214 OFFICE O/P EST MOD 30 MIN: CPT | Performed by: STUDENT IN AN ORGANIZED HEALTH CARE EDUCATION/TRAINING PROGRAM

## 2024-06-10 PROCEDURE — 1124F ACP DISCUSS-NO DSCNMKR DOCD: CPT | Performed by: STUDENT IN AN ORGANIZED HEALTH CARE EDUCATION/TRAINING PROGRAM

## 2024-06-10 PROCEDURE — 80354 DRUG SCREENING FENTANYL: CPT

## 2024-06-10 PROCEDURE — 80053 COMPREHEN METABOLIC PANEL: CPT

## 2024-06-10 PROCEDURE — 3078F DIAST BP <80 MM HG: CPT | Performed by: STUDENT IN AN ORGANIZED HEALTH CARE EDUCATION/TRAINING PROGRAM

## 2024-06-10 PROCEDURE — 80365 DRUG SCREENING OXYCODONE: CPT

## 2024-06-10 PROCEDURE — 36415 COLL VENOUS BLD VENIPUNCTURE: CPT

## 2024-06-10 PROCEDURE — 1159F MED LIST DOCD IN RCRD: CPT | Performed by: STUDENT IN AN ORGANIZED HEALTH CARE EDUCATION/TRAINING PROGRAM

## 2024-06-10 RX ORDER — HYDROCODONE BITARTRATE AND ACETAMINOPHEN 5; 325 MG/1; MG/1
1 TABLET ORAL 3 TIMES DAILY PRN
Qty: 20 TABLET | Refills: 0 | Status: SHIPPED | OUTPATIENT
Start: 2024-06-10

## 2024-06-10 RX ORDER — GABAPENTIN 300 MG/1
300 CAPSULE ORAL 2 TIMES DAILY
Qty: 180 CAPSULE | Refills: 3 | Status: SHIPPED | OUTPATIENT
Start: 2024-06-10

## 2024-06-10 RX ORDER — DIAZEPAM 5 MG/1
TABLET ORAL
Qty: 2 TABLET | Refills: 0 | Status: SHIPPED | OUTPATIENT
Start: 2024-06-10

## 2024-06-10 RX ORDER — NORTRIPTYLINE HYDROCHLORIDE 25 MG/1
50 CAPSULE ORAL DAILY
Qty: 180 CAPSULE | Refills: 3 | Status: SHIPPED | OUTPATIENT
Start: 2024-06-10 | End: 2025-06-10

## 2024-06-10 ASSESSMENT — PATIENT HEALTH QUESTIONNAIRE - PHQ9
1. LITTLE INTEREST OR PLEASURE IN DOING THINGS: NOT AT ALL
2. FEELING DOWN, DEPRESSED OR HOPELESS: NOT AT ALL
SUM OF ALL RESPONSES TO PHQ9 QUESTIONS 1 AND 2: 0

## 2024-06-10 ASSESSMENT — ENCOUNTER SYMPTOMS: DEPRESSION: 0

## 2024-06-10 NOTE — PROGRESS NOTES
Subjective   Reason for Visit: Galina Chao is an 74 y.o. female here for a officevisit.       HPI    Primary insomnia Long history of this, on Ambien doing well medical floor every 6 months.     Patient has no chronic back issues.  She does take some  patient has 1-2 refills a year just takes only as needed.  From her old doctor.  She has pain management as well for this.     COPD does follow with pulmonology doing well very mild case.    OARRS:  No data recorded  I have personally reviewed the OARRS report for Galina Chao. I have considered the risks of abuse, dependence, addiction and diversion    Is the patient prescribed a combination of a benzodiazepine and opioid?  No    Last Urine Drug Screen / ordered today: Yes  No results found for this or any previous visit (from the past 8760 hour(s)).  Results are as expected.           Controlled Substance Agreement:  Date of the Last Agreement: 6/10  Reviewed Controlled Substance Agreement including but not limited to the benefits, risks, and alternatives to treatment with a Controlled Substance medication(s).    Opioids:  What is the patient's goal of therapy? Pain conrol  Is this being achieved with current treatment? yes    I have calculated the patient's Morphine Dose Equivalent (MED):   I have considered referral to Pain Management and/or a specialist, and do not feel it is necessary at this time.    I feel that it is clinically indicated to continue this current medication regimen after consideration of alternative therapies, and other non-opioid treatment.    Opioid Risk Screening:  No data recorded    Pain Assessment:  No data recorded and Sleep Aids:   What is the patient's goal of therapy? sleep  Is this being achieved with current treatment? cash    Activities of Daily Living:   Is your overall impression that this patient is benefiting (symptom reduction outweighs side effects) from sleep aid therapy? Yes     1. Physical Functioning: Better  2.  Family Relationship: Better  3. Social Relationship: Better  4. Mood: Better  5. Sleep Patterns: Better  6. Overall Function: Better      Patient Care Team:  Juvencio Gonzalez DO as PCP - General (Internal Medicine)  Iban Ashely MD as Anesthesiologist (Pain Medicine)     Review of Systems   All other systems reviewed and are negative.      Objective   Vitals:  Vitals:    06/10/24 1056   BP: 130/72   Pulse: 63   SpO2: 100%           Physical Exam  Constitutional:       Appearance: Normal appearance.   HENT:      Head: Normocephalic and atraumatic.      Right Ear: Tympanic membrane and ear canal normal.      Left Ear: Tympanic membrane and ear canal normal.      Mouth/Throat:      Mouth: Mucous membranes are moist.      Pharynx: Oropharynx is clear.   Eyes:      Extraocular Movements: Extraocular movements intact.      Conjunctiva/sclera: Conjunctivae normal.      Pupils: Pupils are equal, round, and reactive to light.   Cardiovascular:      Rate and Rhythm: Normal rate and regular rhythm.      Pulses: Normal pulses.      Heart sounds: Normal heart sounds.   Pulmonary:      Effort: Pulmonary effort is normal.      Breath sounds: Normal breath sounds.   Abdominal:      General: Abdomen is flat. Bowel sounds are normal.      Palpations: Abdomen is soft.   Musculoskeletal:         General: Normal range of motion.      Cervical back: Normal range of motion and neck supple.   Skin:     General: Skin is warm and dry.      Capillary Refill: Capillary refill takes 2 to 3 seconds.   Neurological:      General: No focal deficit present.      Mental Status: She is alert and oriented to person, place, and time. Mental status is at baseline.   Psychiatric:         Mood and Affect: Mood normal.         Behavior: Behavior normal.         Thought Content: Thought content normal.         Judgment: Judgment normal.       Assessment/Plan   1. Chronic bilateral low back pain without sciatica  Takes sparingly  Follow pain management  Doing  ok  - HYDROcodone-acetaminophen (Norco) 5-325 mg tablet; Take 1 tablet by mouth 3 times a day as needed for severe pain (7 - 10).  Dispense: 20 tablet; Refill: 0    2. Right cervical radiculopathy    - gabapentin (Neurontin) 300 mg capsule; Take 1 capsule (300 mg) by mouth 2 times a day.  Dispense: 180 capsule; Refill: 3    3. Depression, unspecified depression type  stable  - nortriptyline (Pamelor) 25 mg capsule; Take 2 capsules (50 mg) by mouth once daily.  Dispense: 180 capsule; Refill: 3    4. Myelopathy concurrent with and due to spinal stenosis of cervical region (Multi)  Stable  Follows neurosurgyer    5. Sicca syndrome (Multi)  - follows rheum  Monitor labs  - CBC and Auto Differential  - Comprehensive Metabolic Panel  - TSH with reflex to Free T4 if abnormal  - Hemoglobin A1C    6. Controlled substance agreement signed    - Opiate/Opioid/Benzo Prescription Compliance    7. Impaired fasting blood sugar    - Hemoglobin A1C  d

## 2024-06-10 NOTE — PROGRESS NOTES
Subjective   Patient ID: Galina Chao is a 74 y.o. female who presents for Follow-up.    HPI     Review of Systems    Objective   /72 (BP Location: Left arm, Patient Position: Sitting, BP Cuff Size: Large adult)   Pulse 63   Wt 83.9 kg (185 lb)   LMP 09/30/2023   SpO2 100%   BMI 28.98 kg/m²     Physical Exam    Assessment/Plan

## 2024-06-11 LAB
AMPHETAMINES UR QL SCN: NORMAL
BARBITURATES UR QL SCN: NORMAL
BZE UR QL SCN: NORMAL
CANNABINOIDS UR QL SCN: NORMAL
CREAT UR-MCNC: 36 MG/DL (ref 20–320)
PCP UR QL SCN: NORMAL

## 2024-06-12 ENCOUNTER — APPOINTMENT (OUTPATIENT)
Dept: OBSTETRICS AND GYNECOLOGY | Facility: CLINIC | Age: 74
End: 2024-06-12
Payer: MEDICARE

## 2024-06-12 ENCOUNTER — HOSPITAL ENCOUNTER (OUTPATIENT)
Dept: RADIOLOGY | Facility: HOSPITAL | Age: 74
Discharge: HOME | End: 2024-06-12
Payer: MEDICARE

## 2024-06-12 DIAGNOSIS — R26.89 IMBALANCE: ICD-10-CM

## 2024-06-12 DIAGNOSIS — M54.12 CERVICAL RADICULOPATHY: ICD-10-CM

## 2024-06-12 DIAGNOSIS — Z98.890 HX OF EXCISION OF LAMINA OF CERVICAL VERTEBRA FOR DECOMPRESSION OF SPINAL CORD: ICD-10-CM

## 2024-06-12 PROCEDURE — 72141 MRI NECK SPINE W/O DYE: CPT

## 2024-06-12 PROCEDURE — 72141 MRI NECK SPINE W/O DYE: CPT | Performed by: RADIOLOGY

## 2024-06-13 ENCOUNTER — EVALUATION (OUTPATIENT)
Dept: PHYSICAL THERAPY | Facility: CLINIC | Age: 74
End: 2024-06-13
Payer: MEDICARE

## 2024-06-13 DIAGNOSIS — R26.89 IMBALANCE: ICD-10-CM

## 2024-06-13 DIAGNOSIS — M54.12 CERVICAL RADICULOPATHY: Primary | ICD-10-CM

## 2024-06-13 LAB
1OH-MIDAZOLAM UR CFM-MCNC: <25 NG/ML
6MAM UR CFM-MCNC: <25 NG/ML
7AMINOCLONAZEPAM UR CFM-MCNC: <25 NG/ML
A-OH ALPRAZ UR CFM-MCNC: <25 NG/ML
ALPRAZ UR CFM-MCNC: <25 NG/ML
CHLORDIAZEP UR CFM-MCNC: <25 NG/ML
CLONAZEPAM UR CFM-MCNC: <25 NG/ML
CODEINE UR CFM-MCNC: <50 NG/ML
DIAZEPAM UR CFM-MCNC: <25 NG/ML
EDDP UR CFM-MCNC: <25 NG/ML
FENTANYL UR CFM-MCNC: <2.5 NG/ML
HYDROCODONE CTO UR CFM-MCNC: <25 NG/ML
HYDROMORPHONE UR CFM-MCNC: <25 NG/ML
LORAZEPAM UR CFM-MCNC: <25 NG/ML
METHADONE UR CFM-MCNC: <25 NG/ML
MIDAZOLAM UR CFM-MCNC: <25 NG/ML
MORPHINE UR CFM-MCNC: <50 NG/ML
NORDIAZEPAM UR CFM-MCNC: <25 NG/ML
NORFENTANYL UR CFM-MCNC: <2.5 NG/ML
NORHYDROCODONE UR CFM-MCNC: <25 NG/ML
NOROXYCODONE UR CFM-MCNC: <25 NG/ML
NORTRAMADOL UR-MCNC: <50 NG/ML
OXAZEPAM UR CFM-MCNC: <25 NG/ML
OXYCODONE UR CFM-MCNC: <25 NG/ML
OXYMORPHONE UR CFM-MCNC: <25 NG/ML
TEMAZEPAM UR CFM-MCNC: <25 NG/ML
TRAMADOL UR CFM-MCNC: <50 NG/ML
ZOLPIDEM UR CFM-MCNC: 441 NG/ML
ZOLPIDEM UR-MCNC: <25 NG/ML

## 2024-06-13 PROCEDURE — 97161 PT EVAL LOW COMPLEX 20 MIN: CPT | Mod: GP

## 2024-06-13 PROCEDURE — 97110 THERAPEUTIC EXERCISES: CPT | Mod: GP

## 2024-06-13 NOTE — PROGRESS NOTES
Patient Name Galina Chao  MRN: [id number]  Today's Date: 24  Time Calculation  Start Time: 400  Stop Time: 445  Time Calculation (min): 45 min    Therapy Diagnoses:   Problem List Items Addressed This Visit             ICD-10-CM    Cervical radiculopathy - Primary M54.12    Relevant Orders    Follow Up In Physical Therapy    Imbalance R26.89    Relevant Orders    Follow Up In Physical Therapy     General:  Reason for visit: cervical radiculopathy and imbalance   Referred by: Glo Gutiérrez MD appt:  2024  Preferred Name:  Galina  Script:  Pt eval and treat  Onset Date:  2024    Insurance:   Visit #: 1  Insurance Reviewed  (per information provided by  pre-cert team)   Authorization required:  N  Approved # of visits: MN    Assessment:      Patient with exacerbation of CS OA, and presence of imbalance. Needs work on ROM, flexibility, posture, scapular and dynamic stabilization, strength,closed chain activities, soft tissue mobility, gait and stairs for improved overall function.    Problems:    Pain:  _x__  Posture/Body mechanics deficits:  _x__  Decreased knowledge HEP:  _x__  Decreased knowledge of Precautions:  ___  Activity Limitations:   ___  ADL's/IADL's/Self-care skills:  ___  ROM/Joint Mobility:  _x__  Strength:  _x__  Decreased functional level:   __x_  Flexibility:  _x__  Tenderness/decreased soft tissue mobility:  _x__  Gait/Stairs:  _x__  Fall Risk:  _xx__  Balance:  ___  Edema:  ___  Participation restrictions:  ___  Sensory:  ___  Transfers:  ___  Decreased knowledge of brace:   ___  Other:  ___    X Indicates included in problem list    Goals:      ST weeks  Increased postural awareness    Pt will hold SL stance for 5 seconds    Compliant with HEP.     LTG:  by discharge  Increased postural awareness and posture WFL.     pain to cervical region by 50% and patient I with self-management of symptoms.     Decreased pain and increased function with ADL's and IADL's.   Improve NDI to 20 % limitation of function and ABC Scale to 75%    Normal gait on level and uneven surfaces community level distances while minimizing swaying for improved function in the community.    Normal reciprocal pattern on stairs with 1 hand rail support rather than 2 for improved function to all levels of home.      Increase cervical AROM by 10-20 deg each plane for improved function with dressing and driving.      Increase cervical and trunk strength and stability and R/L UE LE strength to WFL for improved function with chores.      Increase B upper quarter/LE flexibility to WFL.      Decrease B upper quarter tenderness 50-75% per patient report.    I and compliant with HEP and proper neck care.       Rehab potential to achieve the above goals is good.    Patient is aware of diagnosis and prognosis and agrees with established plan of care and goals.    Plan:   Skilled PT 1-2 x/week for 4-6 weeks( Until goals achieved, maximum rehab potential has been achieved, or patient has plateaued)  for:    Aquatics  _____  CP   ____  Dry needling  ____  Education  __x__  Electrical Stimulation  __x__  Gait training  ____  HEP  __x__  HP  ____  Manual  __x__  Mechanical Traction  __x__  NMR  __x__  Self-care/home management  __x__  Therapeutic Exercise   __x__  Therapeutic Activities  __x__    ____  Work Conditioning  ____  Re-assessment  __x__  Other  ____    X Indicates included in treatment plan    PT for Nu-step for functional mobility and soft tissue warm up for more efficient stretching, work on     Subjective:    HPI: Chronic history of neck pain for a few years.  Had laminectomy in 1984. Also, complains of balance issues, but denies any falls in the past 6 months. She notes that she is unsteady when first standing up, and then also feels as if her legs don't go where they should, then she overcompensates.  She feels uncomfortable in a department store where this isn't something to reach and touch    Pain:   3-4 low pain first thing in the morning goes up to 10  Pain     Type of pain:  constant bilateral posterior neck, UT, to shoulders. Pt denies paresthesias of arms/hands.  What increases pain: cleaning the  house, lifting, looking up and reaching top shelf, moving head side to side, and looking down at the computer  What decreases pain:  Extra Strength Arthritis Tylenol, occasional Vicoden, recliner    Medical Hx/ Precautions: HTN, Emphysema, Lung Disease, Headaches, Thyroid Disorder       Adult Screening Risk:      Fall Risk:  low      Patient goal:  no pain or less pain  Patient is aware of diagnosis and prognosis.    Living Environment:    Social Support:  lives with:  spouse  DME: none    Prior Function:  Pt  has been slightly limited by reduced of neck extension for some time, otherwise has carried out most daily activities    Function:    A and O x 3  Sleep:  undisturbed  ADL's: indep  Chores: limited with looking up and reaching higher shelves  Driving: indep  Work: retired    Sitting: increased pain especially with looking down        Objective:        Outcome Measures:  Other Measures  Activities - Specific Balance Confidence Scale: 68.13  Neck Disability Index: 42     Posture: forward head and rounded shoulders    Gait/Stairs: can go up and down 4 steps with 2 hand rails  Balance: can't balance 1 second on each leg  Palpation: tender/tight to cervical and UT region    ROM:  Cervical   flexion: full  Ext: 12  RSB: 22  LSB: 17  RR: 40  LR: 45    MMT:  Cervical:  B UE myotomes: WFL  Poor scapular and core stability    Flexibility:  Pectorals: mod  Upper trapezius: mod    Treatment:    Evaluation:  30 minutes    Treatment: Therapeutic ex: 15 minutes  Access Code: [email or web address]  URL: [email or web address]  Date: 06/14/2024  Prepared by: Lucy Marquez    Exercises  - Seated Shoulder Rolls  - 1-2 x daily - 7 x weekly - 1-2 sets - 10 reps  - Neck Sidebending  - 1-2 x daily - 7 x weekly - 1 sets -  "10 reps - 5\" hold  - Neck Rotation  - 1-2 x daily - 7 x weekly - 1 sets - 10 reps - 5\" hold  - Seated Correct Posture   - Standing Tandem Balance with Counter Support  - 1 x daily - 7 x weekly - 1 sets - 1-2 reps - 30 seconds hold  - Side Stepping with Counter Support  - 1-2 x daily - 7 x weekly - 1 sets - 4-6 reps  - Heel Toe Raises with Unilateral Counter Support  - 1-2 x daily - 7 x weekly - 1 sets - 10 reps  Education:  poc, anatomy, physiology, posture, body mechanics, safety awareness, HEP.  Preferred learning:  pictures, demonstration.  Demonstrated good understanding.                                       "

## 2024-06-14 PROBLEM — R26.89 IMBALANCE: Status: ACTIVE | Noted: 2024-06-14

## 2024-06-14 ASSESSMENT — COLUMBIA-SUICIDE SEVERITY RATING SCALE - C-SSRS
6. HAVE YOU EVER DONE ANYTHING, STARTED TO DO ANYTHING, OR PREPARED TO DO ANYTHING TO END YOUR LIFE?: NO
1. IN THE PAST MONTH, HAVE YOU WISHED YOU WERE DEAD OR WISHED YOU COULD GO TO SLEEP AND NOT WAKE UP?: NO
2. HAVE YOU ACTUALLY HAD ANY THOUGHTS OF KILLING YOURSELF?: NO
6. HAVE YOU EVER DONE ANYTHING, STARTED TO DO ANYTHING, OR PREPARED TO DO ANYTHING TO END YOUR LIFE?: NO
2. HAVE YOU ACTUALLY HAD ANY THOUGHTS OF KILLING YOURSELF?: NO
1. IN THE PAST MONTH, HAVE YOU WISHED YOU WERE DEAD OR WISHED YOU COULD GO TO SLEEP AND NOT WAKE UP?: NO

## 2024-06-14 ASSESSMENT — PATIENT HEALTH QUESTIONNAIRE - PHQ9
SUM OF ALL RESPONSES TO PHQ9 QUESTIONS 1 AND 2: 0
2. FEELING DOWN, DEPRESSED OR HOPELESS: NOT AT ALL
1. LITTLE INTEREST OR PLEASURE IN DOING THINGS: NOT AT ALL

## 2024-06-14 ASSESSMENT — ENCOUNTER SYMPTOMS
OCCASIONAL FEELINGS OF UNSTEADINESS: 0
LOSS OF SENSATION IN FEET: 0
DEPRESSION: 0

## 2024-06-21 ENCOUNTER — APPOINTMENT (OUTPATIENT)
Dept: NEUROSURGERY | Facility: CLINIC | Age: 74
End: 2024-06-21
Payer: MEDICARE

## 2024-06-21 VITALS
WEIGHT: 182 LBS | SYSTOLIC BLOOD PRESSURE: 138 MMHG | DIASTOLIC BLOOD PRESSURE: 78 MMHG | HEART RATE: 65 BPM | BODY MASS INDEX: 29.25 KG/M2 | TEMPERATURE: 97.1 F | HEIGHT: 66 IN

## 2024-06-21 DIAGNOSIS — M54.12 CERVICAL RADICULOPATHY: Primary | ICD-10-CM

## 2024-06-21 DIAGNOSIS — R26.89 IMBALANCE: ICD-10-CM

## 2024-06-21 DIAGNOSIS — M48.02 CERVICAL STENOSIS OF SPINAL CANAL: ICD-10-CM

## 2024-06-21 PROCEDURE — 3075F SYST BP GE 130 - 139MM HG: CPT | Performed by: NURSE PRACTITIONER

## 2024-06-21 PROCEDURE — 1125F AMNT PAIN NOTED PAIN PRSNT: CPT | Performed by: NURSE PRACTITIONER

## 2024-06-21 PROCEDURE — 3078F DIAST BP <80 MM HG: CPT | Performed by: NURSE PRACTITIONER

## 2024-06-21 PROCEDURE — 1160F RVW MEDS BY RX/DR IN RCRD: CPT | Performed by: NURSE PRACTITIONER

## 2024-06-21 PROCEDURE — 1159F MED LIST DOCD IN RCRD: CPT | Performed by: NURSE PRACTITIONER

## 2024-06-21 PROCEDURE — 1036F TOBACCO NON-USER: CPT | Performed by: NURSE PRACTITIONER

## 2024-06-21 PROCEDURE — 99215 OFFICE O/P EST HI 40 MIN: CPT | Performed by: NURSE PRACTITIONER

## 2024-06-21 ASSESSMENT — LIFESTYLE VARIABLES
SKIP TO QUESTIONS 9-10: 1
HOW MANY STANDARD DRINKS CONTAINING ALCOHOL DO YOU HAVE ON A TYPICAL DAY: 1 OR 2
AUDIT-C TOTAL SCORE: 1
HOW OFTEN DO YOU HAVE A DRINK CONTAINING ALCOHOL: MONTHLY OR LESS
HOW OFTEN DO YOU HAVE SIX OR MORE DRINKS ON ONE OCCASION: NEVER

## 2024-06-21 ASSESSMENT — PAIN SCALES - GENERAL: PAINLEVEL: 8

## 2024-06-21 ASSESSMENT — PATIENT HEALTH QUESTIONNAIRE - PHQ9
SUM OF ALL RESPONSES TO PHQ9 QUESTIONS 1 & 2: 0
2. FEELING DOWN, DEPRESSED OR HOPELESS: NOT AT ALL
1. LITTLE INTEREST OR PLEASURE IN DOING THINGS: NOT AT ALL

## 2024-06-21 NOTE — PROGRESS NOTES
Galina Chao is here today, with her , in follow up for cervical radiculopathy, cervical myelopathy, and to review images.     To review, she was evaluated on 07/21/2023, by Dr. Jerome Recio. She  reported significant neck pain and BUE hand paresthesia. On exam, she had known + Rolando's bilaterally, no clonus. Orders were written for EMG / NCS.     At her visit 05/28/2024, she continued with same pain, BUE hand paresthesia, with imbalance which were worsening. She had EMG / NCS, which was negative for cervical / nerve injury. On exam, she had motor and sensory deficits in RUE, right Rolando's sign present, tandem gait was unsteady. MRI C Spine was requested along with PT. She was encouraged to continue with Pain Management.    Today, she has gone to PT, but she continues to use countertops / walls / furniture to get around her home for balance. She admits to falling onto her back (fell backwards from a step) approx 2 months ago while carrying a box. Symptoms are no worse or better since falling. However, balance worsening is progressive over the past year. She had imaging completed and is here to review findings.    MRI C SPINE on 06/12/2024:  IMPRESSION:  Multilevel degenerative changes of the cervical spine, worst at C3-4  with moderate spinal canal stenosis and cord deformation without cord  signal abnormality. Laminectomy at the C5-C7 levels.   Signed by: Seb Carrington      TREATMENTS:  Pain management: cervical MBB; cervical RFA 02/02/24: 80% relief with MBB - none with the RFA. She has another one scheduled in August 2024  PT: one  visit thus far  Home Exercise Program: daily cervical spine stretches  Gabapentin   Posterior C5 - T1 laminectomy in 1986  L4 laminectomy by Dr Munoz at [facility]   Additional Lumbar surgery x 1 Dr Gant    SMOKER: Former  ANTICOAGULANT USE: YES: Daily baby ASA    ROS x 10 is, otherwise, negative unless documented in HPI above    /78 (BP Location: Left arm,  "Patient Position: Sitting, BP Cuff Size: Adult)   Pulse 65   Temp 36.2 °C (97.1 °F) (Temporal)   Ht 1.676 m (5' 6\")   Wt 82.6 kg (182 lb)   LMP 09/30/2023   BMI 29.38 kg/m²     On Exam: Appears comfortable  A&O x 4, speech clear / fluent  Respirations even / unlabored  Abdomen without distension  VAUGHAN  Gait is steady    Galina Chao has cervical myelopathy. We reviewed MRI C Spine with note of severe canal stenosis at C3 - 4 with cord deformity, but no cord injury. Comparison between 2023 MRI and current MRI shows stenosis has, indeed, progressed at C2 - 3, C3 -4. We discussed follow up with Dr. Recio to discuss surgical options, with CT C Spine prior to visit. She verbalizes understanding and agreement. She has cervical RFA scheduled with Dr. Ashley on 08/06/2024.  Glo Villalta, APRN-CNP          "

## 2024-07-01 ENCOUNTER — HOSPITAL ENCOUNTER (OUTPATIENT)
Dept: RADIOLOGY | Facility: HOSPITAL | Age: 74
Discharge: HOME | End: 2024-07-01
Payer: MEDICARE

## 2024-07-01 ENCOUNTER — APPOINTMENT (OUTPATIENT)
Dept: PHYSICAL THERAPY | Facility: CLINIC | Age: 74
End: 2024-07-01
Payer: MEDICARE

## 2024-07-01 DIAGNOSIS — R26.89 IMBALANCE: ICD-10-CM

## 2024-07-01 DIAGNOSIS — M48.02 CERVICAL STENOSIS OF SPINAL CANAL: ICD-10-CM

## 2024-07-01 DIAGNOSIS — M54.12 CERVICAL RADICULOPATHY: ICD-10-CM

## 2024-07-01 PROCEDURE — 72125 CT NECK SPINE W/O DYE: CPT

## 2024-07-01 PROCEDURE — 72125 CT NECK SPINE W/O DYE: CPT | Performed by: RADIOLOGY

## 2024-07-03 ENCOUNTER — APPOINTMENT (OUTPATIENT)
Dept: OBSTETRICS AND GYNECOLOGY | Facility: CLINIC | Age: 74
End: 2024-07-03
Payer: MEDICARE

## 2024-07-03 VITALS — DIASTOLIC BLOOD PRESSURE: 66 MMHG | SYSTOLIC BLOOD PRESSURE: 110 MMHG | WEIGHT: 186 LBS | BODY MASS INDEX: 30.02 KG/M2

## 2024-07-03 DIAGNOSIS — N64.59 NIPPLE SYMPTOM OR SIGN IN FEMALE: ICD-10-CM

## 2024-07-03 DIAGNOSIS — Z12.31 VISIT FOR SCREENING MAMMOGRAM: Primary | ICD-10-CM

## 2024-07-03 PROCEDURE — 3074F SYST BP LT 130 MM HG: CPT | Performed by: OBSTETRICS & GYNECOLOGY

## 2024-07-03 PROCEDURE — 99214 OFFICE O/P EST MOD 30 MIN: CPT | Performed by: OBSTETRICS & GYNECOLOGY

## 2024-07-03 PROCEDURE — 3078F DIAST BP <80 MM HG: CPT | Performed by: OBSTETRICS & GYNECOLOGY

## 2024-07-03 PROCEDURE — 1159F MED LIST DOCD IN RCRD: CPT | Performed by: OBSTETRICS & GYNECOLOGY

## 2024-07-03 NOTE — PROGRESS NOTES
Galina Chao is a 74 y.o. female who presents with a chief complaint of Pain (Left nipple pain)  SUBJECTIVE  She was last seen August 23, 2023 for lichen sclerosis.  Her last mammogram in August 25, 2023 required right breast diagnostic imaging that was normal, the asymmetry had resolved.    She is concerned about the left nipple itchiness for about 2 months.  No nipple discharge.  No blistering or lesions.  No lumps, no trauma, there is some discomfort of the left nipple.    Past Medical History:   Diagnosis Date    Allergic     Arthritis     COPD (chronic obstructive pulmonary disease) (Multi)     Disease of thyroid gland     Diverticulosis of intestine, part unspecified, without perforation or abscess without bleeding     Diverticulosis    Emphysema of lung (Multi)     GERD (gastroesophageal reflux disease)     Heart murmur     Hypertension     Inflammatory bowel disease     Obesity, unspecified     Adult-onset obesity    Personal history of other diseases of the circulatory system     History of hypertension    Personal history of other diseases of the circulatory system 10/09/2020    History of hypertension    Personal history of other diseases of the circulatory system     History of cardiac murmur    Personal history of other diseases of the digestive system     History of gastroesophageal reflux (GERD)    Personal history of other diseases of the digestive system     History of irritable bowel syndrome    Personal history of other diseases of the musculoskeletal system and connective tissue     History of arthritis    Personal history of other diseases of the musculoskeletal system and connective tissue     History of arthritis    Personal history of other diseases of the musculoskeletal system and connective tissue     History of fibromyalgia    Personal history of other diseases of the respiratory system     History of chronic obstructive lung disease    Personal history of other diseases of the  respiratory system     History of pulmonary emphysema    Personal history of other endocrine, nutritional and metabolic disease     History of thyroid disorder     Past Surgical History:   Procedure Laterality Date    APPENDECTOMY  2018    Appendectomy    BACK SURGERY  10/12/2017    Back Surgery    CARDIAC CATHETERIZATION      CARDIAC SURGERY  10/12/2017    Heart Surgery    CHOLECYSTECTOMY  2016    Cholecystectomy    ENDOMETRIAL ABLATION      JOINT REPLACEMENT      OTHER SURGICAL HISTORY  06/15/2022    Knee replacement    OTHER SURGICAL HISTORY  06/15/2022    Endometrial ablation    OTHER SURGICAL HISTORY  2018    Previous Stent Placement    OTHER SURGICAL HISTORY  2018    Abdominal Surgery     Social History     Socioeconomic History    Marital status:      Spouse name: None    Number of children: None    Years of education: None    Highest education level: None   Occupational History    None   Tobacco Use    Smoking status: Former     Current packs/day: 0.00     Average packs/day: 2.0 packs/day for 33.0 years (66.0 ttl pk-yrs)     Types: Cigarettes     Start date: 1963     Quit date: 1996     Years since quittin.5    Smokeless tobacco: Never   Vaping Use    Vaping status: Never Used   Substance and Sexual Activity    Alcohol use: Yes     Comment: RARELY    Drug use: Never    Sexual activity: Not Currently   Other Topics Concern    None   Social History Narrative    None     Social Determinants of Health     Financial Resource Strain: Not on file   Food Insecurity: No Food Insecurity (2023)    Hunger Vital Sign     Worried About Running Out of Food in the Last Year: Never true     Ran Out of Food in the Last Year: Never true   Transportation Needs: Not on file   Physical Activity: Not on file   Stress: Not on file   Social Connections: Not on file   Intimate Partner Violence: Not on file   Housing Stability: Not on file     Family History   Problem Relation Name Age  of Onset    Cancer Mother JEFRY GRANT     Hypertension Mother JEFRY GRANT     Cancer Father ISA GRANT JR     Hypertension Father ISA GRANT JR     Leukemia Father ISA GRANT JR     Hypertension Sister      Hyperlipidemia Sister      Kidney disease Brother ISA GRANT JR     Stroke Brother ISA GRANT JR     Other (ischemic heart disease) Other         OB History   No obstetric history on file.       OBJECTIVE  Allergies   Allergen Reactions    Ciprofloxacin Swelling and Angioedema     facial swelling    Indomethacin Itching and Other     GI upset, abdominal pain    Sulfa (Sulfonamide Antibiotics) Swelling    Nsaids (Non-Steroidal Anti-Inflammatory Drug) Other     Nsaids-GI upset  INDOCIN      (Not in a hospital admission)       Review of Systems  Negative except as left nipple irritation  Physical Exam  General Appearance: awake, alert, oriented, in no acute distress  A bilateral breast exam is normal.  The right breast showed no nipple changes: No lymphadenopathy, no masses.  Specifically the left nipple appears normal.  No redness, no discharge.  No skin changes.  No lymphadenopathy  /66   Wt 84.4 kg (186 lb)   LMP 09/30/2023   BMI 30.02 kg/m²    Problem List Items Addressed This Visit    None  This is a 74-year-old female who reports about 2 months history of left nipple discomfort and itchiness.  The exam appears normal. I do recommend applying topical Aquaphor.  We discussed proceeding with diagnostic left breast imaging but she is due for her screening mammogram in August 2024 and this was ordered.  She will call me if there is a change in symptoms.

## 2024-07-08 ENCOUNTER — APPOINTMENT (OUTPATIENT)
Dept: PHYSICAL THERAPY | Facility: CLINIC | Age: 74
End: 2024-07-08
Payer: MEDICARE

## 2024-07-08 ENCOUNTER — DOCUMENTATION (OUTPATIENT)
Dept: PHYSICAL THERAPY | Facility: CLINIC | Age: 74
End: 2024-07-08
Payer: MEDICARE

## 2024-07-08 NOTE — PROGRESS NOTES
Physical Therapy                 Therapy Communication Note    Patient Name: Galina Chao  MRN: [id number]  Today's Date: 7/8/2024     Discipline: Physical Therapy    Missed Visit Reason:  no transportation    Missed Time: Cancel    Comment:

## 2024-07-10 ENCOUNTER — OFFICE VISIT (OUTPATIENT)
Dept: NEUROSURGERY | Facility: CLINIC | Age: 74
End: 2024-07-10
Payer: MEDICARE

## 2024-07-10 VITALS
WEIGHT: 181 LBS | BODY MASS INDEX: 29.09 KG/M2 | HEIGHT: 66 IN | DIASTOLIC BLOOD PRESSURE: 82 MMHG | TEMPERATURE: 97.8 F | SYSTOLIC BLOOD PRESSURE: 138 MMHG | HEART RATE: 60 BPM

## 2024-07-10 DIAGNOSIS — M48.02 DEGENERATIVE CERVICAL SPINAL STENOSIS: ICD-10-CM

## 2024-07-10 DIAGNOSIS — M47.12 CERVICAL SPONDYLOSIS WITH MYELOPATHY: Primary | ICD-10-CM

## 2024-07-10 DIAGNOSIS — M54.12 CERVICAL RADICULOPATHY: ICD-10-CM

## 2024-07-10 DIAGNOSIS — G99.2 MYELOPATHY CONCURRENT WITH AND DUE TO SPINAL STENOSIS OF CERVICAL REGION (MULTI): ICD-10-CM

## 2024-07-10 DIAGNOSIS — M48.02 MYELOPATHY CONCURRENT WITH AND DUE TO SPINAL STENOSIS OF CERVICAL REGION (MULTI): ICD-10-CM

## 2024-07-10 PROCEDURE — 3075F SYST BP GE 130 - 139MM HG: CPT | Performed by: NEUROLOGICAL SURGERY

## 2024-07-10 PROCEDURE — 1125F AMNT PAIN NOTED PAIN PRSNT: CPT | Performed by: NEUROLOGICAL SURGERY

## 2024-07-10 PROCEDURE — 3079F DIAST BP 80-89 MM HG: CPT | Performed by: NEUROLOGICAL SURGERY

## 2024-07-10 PROCEDURE — 99215 OFFICE O/P EST HI 40 MIN: CPT | Performed by: NEUROLOGICAL SURGERY

## 2024-07-10 PROCEDURE — 1036F TOBACCO NON-USER: CPT | Performed by: NEUROLOGICAL SURGERY

## 2024-07-10 ASSESSMENT — PATIENT HEALTH QUESTIONNAIRE - PHQ9
SUM OF ALL RESPONSES TO PHQ9 QUESTIONS 1 AND 2: 1
2. FEELING DOWN, DEPRESSED OR HOPELESS: NOT AT ALL
1. LITTLE INTEREST OR PLEASURE IN DOING THINGS: SEVERAL DAYS

## 2024-07-10 ASSESSMENT — ENCOUNTER SYMPTOMS: OCCASIONAL FEELINGS OF UNSTEADINESS: 0

## 2024-07-10 ASSESSMENT — PAIN SCALES - GENERAL: PAINLEVEL: 9

## 2024-07-10 NOTE — PROGRESS NOTES
[facility]  Department of Neurological Surgery  established Patient Visit    History of Present Illness  Galina Chao is a 74 y.o. year old female who presents to the spine clinic in follow up with her  complaining of increasing neck pain and shoulder pain with radiation of numbness into her hands left greater than right.  Her balance is getting worse.  She is dropping things.  She cannot close her eyes in the shower.  It is truly affecting her day-to-day living.  She had a new MRI and it shows that the C2-3 level is now significantly stenotic.  This is new compared to the last MRI she had.  Unfortunately I told her that I am no longer doing fusions from C2-T2 and I think that she would do well working with someone who has an understanding of how to use ultrasound to protect the spinal cord on the way in and who also has the expertise of handling the situation postoperatively.  Fortunately I have a partner Dr. Mcgraw who has all of these skill sets and this is one of her areas of expertise.  I told the patient and her  that just because I told her that this is something that would be a reasonable approach that does not mean that is what Dr. Mcgraw is going to be offering.    Patient's BMI is Body mass index is 29.21 kg/m².    14/14 systems reviewed and negative other than what is listed in the history of present illness    Patient Active Problem List   Diagnosis    UTI symptoms    Controlled substance agreement signed    Routine general medical examination at health care facility    Lichen planus    Primary insomnia    Arthritis of knee, left    Arthritis of knee, right    Ataxia    Benign paroxysmal positional vertigo    Bilateral high frequency sensorineural hearing loss    Chronic rhinitis    Dermatofibroma    Essential hypertension    Hypothyroidism    Hyperlipidemia    Lichen sclerosus of female genitalia    Localized osteoarthritis of hand    Memory change    Menopausal  osteoporosis    Migraines    Neck mass    Neuralgia    Bilateral knee pain    Cervical radiculopathy    Sicca syndrome (Multi)    Trochanteric bursitis of both hips    Ulnar neuropathy    Varicose veins of legs    GERD (gastroesophageal reflux disease)    Fracture of proximal end of tibia    Chest tightness    Myelopathy concurrent with and due to spinal stenosis of cervical region (Multi)    Degenerative cervical spinal stenosis    Chronic neck pain with history of cervical spinal surgery    Thoracic back pain    Chronic right-sided low back pain without sciatica    Coronary artery disease    Oral ulcer    Presence of stent in coronary artery    Presence of total left knee joint prosthesis    Vertigo    Abdominal pain    Dry eye    Dry mouth    Status post total right knee replacement    Pain in both lower extremities    Pain of both hip joints    Back pain with history of spinal surgery    Cervical spondylosis with myelopathy    Flushing    Facial swelling    Hives    Adult-onset obesity    Disease due to severe acute respiratory syndrome coronavirus 2 (SARS-CoV-2)    Dyspnea on exertion    Lumbar postlaminectomy syndrome    Cervical postlaminectomy syndrome    Moderate COPD (chronic obstructive pulmonary disease) (Multi)    Varicose veins of lower extremity    Imbalance     Past Medical History:   Diagnosis Date    Allergic     Arthritis     COPD (chronic obstructive pulmonary disease) (Multi)     Disease of thyroid gland     Diverticulosis of intestine, part unspecified, without perforation or abscess without bleeding     Diverticulosis    Emphysema of lung (Multi)     GERD (gastroesophageal reflux disease)     Heart murmur     Hypertension     Inflammatory bowel disease     Obesity, unspecified     Adult-onset obesity    Personal history of other diseases of the circulatory system     History of hypertension    Personal history of other diseases of the circulatory system 10/09/2020    History of hypertension     Personal history of other diseases of the circulatory system     History of cardiac murmur    Personal history of other diseases of the digestive system     History of gastroesophageal reflux (GERD)    Personal history of other diseases of the digestive system     History of irritable bowel syndrome    Personal history of other diseases of the musculoskeletal system and connective tissue     History of arthritis    Personal history of other diseases of the musculoskeletal system and connective tissue     History of arthritis    Personal history of other diseases of the musculoskeletal system and connective tissue     History of fibromyalgia    Personal history of other diseases of the respiratory system     History of chronic obstructive lung disease    Personal history of other diseases of the respiratory system     History of pulmonary emphysema    Personal history of other endocrine, nutritional and metabolic disease     History of thyroid disorder     Past Surgical History:   Procedure Laterality Date    APPENDECTOMY  2018    Appendectomy    BACK SURGERY  10/12/2017    Back Surgery    CARDIAC CATHETERIZATION      CARDIAC SURGERY  10/12/2017    Heart Surgery    CHOLECYSTECTOMY  2016    Cholecystectomy    ENDOMETRIAL ABLATION      JOINT REPLACEMENT      OTHER SURGICAL HISTORY  06/15/2022    Knee replacement    OTHER SURGICAL HISTORY  06/15/2022    Endometrial ablation    OTHER SURGICAL HISTORY  2018    Previous Stent Placement    OTHER SURGICAL HISTORY  2018    Abdominal Surgery     Social History     Tobacco Use    Smoking status: Former     Current packs/day: 0.00     Average packs/day: 2.0 packs/day for 33.0 years (66.0 ttl pk-yrs)     Types: Cigarettes     Start date: 1963     Quit date: 1996     Years since quittin.5    Smokeless tobacco: Never   Substance Use Topics    Alcohol use: Yes     Comment: RARELY     family history includes Cancer in her father and mother;  Hyperlipidemia in her sister; Hypertension in her father, mother, and sister; Kidney disease in her brother; Leukemia in her father; Stroke in her brother; ischemic heart disease in an other family member.    Current Outpatient Medications:     albuterol 90 mcg/actuation inhaler, Inhale 2 puffs every 4 hours if needed for shortness of breath., Disp: , Rfl:     amLODIPine (Norvasc) 5 mg tablet, Take 1 tablet (5 mg) by mouth once daily., Disp: 90 tablet, Rfl: 3    aspirin 81 mg EC tablet, Take 1 tablet (81 mg) by mouth once daily., Disp: , Rfl:     atenolol (Tenormin) 25 mg tablet, Take 1 tablet (25 mg) by mouth once daily., Disp: 90 tablet, Rfl: 3    atorvastatin (Lipitor) 20 mg tablet, TAKE 1 TABLET DAILY, Disp: 90 tablet, Rfl: 3    biotin 10 mg tablet, Take 2 tablets (20 mg) by mouth once daily., Disp: , Rfl:     calcium carbonate (CALCIUM 600 ORAL), 2 times a day., Disp: , Rfl:     cholecalciferol (Vitamin D-3) 50 mcg (2,000 unit) capsule, Take 1 capsule (50 mcg) by mouth early in the morning.., Disp: , Rfl:     dexAMETHasone 0.5 mg/5 mL elixir, Take every 6 hours, Disp: , Rfl:     diazePAM (Valium) 5 mg tablet, Take one tablet ONE HOUR BEFORE THE MRI. Then take another tablet on arrival to MRI facility, Disp: 2 tablet, Rfl: 0    eplerenone (Inspra) 25 mg tablet, Take 1 tablet (25 mg) by mouth once daily., Disp: 90 tablet, Rfl: 3    gabapentin (Neurontin) 300 mg capsule, Take 1 capsule (300 mg) by mouth 2 times a day., Disp: 180 capsule, Rfl: 3    HYDROcodone-acetaminophen (Norco) 5-325 mg tablet, Take 1 tablet by mouth 3 times a day as needed for severe pain (7 - 10)., Disp: 20 tablet, Rfl: 0    hydroxychloroquine (Plaquenil) 200 mg tablet, Take 2 tablets (400 mg) by mouth once daily., Disp: , Rfl:     Incruse Ellipta 62.5 mcg/actuation inhalation, Inhale 1 puff (62.5 mcg) once daily., Disp: 90 capsule, Rfl: 3    lansoprazole (Prevacid) 30 mg DR capsule, TAKE 1 CAPSULE BY MOUTH EVERY  MORNING, Disp: 90 capsule,  Rfl: 3    levothyroxine (Synthroid, Levoxyl) 137 mcg tablet, TAKE 1 TABLET BY MOUTH 6 DAYS  PER WEEK, Disp: 78 tablet, Rfl: 3    minoxidil (Loniten) 2.5 mg tablet, Take 0.5 tablets (1.25 mg) by mouth once daily., Disp: 45 tablet, Rfl: 3    multivitamin-iron-minerals-folic acid (Multivitamin 50 Plus) tablet, Take 1 tablet by mouth once daily., Disp: , Rfl:     nortriptyline (Pamelor) 25 mg capsule, Take 2 capsules (50 mg) by mouth once daily., Disp: 180 capsule, Rfl: 3    zinc gluconate 50 mg tablet, Take 1 tablet (50 mg of elemental zinc) by mouth once daily., Disp: , Rfl:     zolpidem (Ambien) 10 mg tablet, TAKE 1 TABLET BY MOUTH DAILY AT  BEDTIME, Disp: 90 tablet, Rfl: 1  Allergies   Allergen Reactions    Ciprofloxacin Swelling and Angioedema     facial swelling    Indomethacin Itching and Other     GI upset, abdominal pain    Sulfa (Sulfonamide Antibiotics) Swelling    Nsaids (Non-Steroidal Anti-Inflammatory Drug) Other     Nsaids-GI upset  INDOCIN       Physical Examination:      General: NAD, AOx 3,  no aphasia or dysarthria, normal fund of knowledge  Cranial Nerves II-XII: VFF, PERRL, EOMI, Face Symm, Facial SILT, Palate/Tongue midline and symmetric  Motor: 5/5 Throughout all extremities,  No drift, no dysmetria on finger to nose  Sensation: SILT and PP throughout all extremities  DTRS: 2+ Throughout, positive Rolando's bilaterally but no clonus  Her gait was good today but she lost her balance on both toe and heel standing and she has a bit more sway when checking her Romberg    Results:  I personally reviewed and interpreted the imaging results which included the new and old MRI of her cervical spine as well as the new CAT scan.  I went over the anatomy with him on the studies and explained why we had to take the fusion up to C2 and to decompress all the way up to C2 as well.    Assessment and Plan:      Galina Chao is a 74 y.o. year old female who presents to the spine clinic in follow up with her   complaining of increasing neck pain and shoulder pain with radiation of numbness into her hands left greater than right.  Her balance is getting worse.  She is dropping things.  She cannot close her eyes in the shower.  It is truly affecting her day-to-day living.  She had a new MRI and it shows that the C2-3 level is now significantly stenotic.  This is new compared to the last MRI she had.  Unfortunately I told her that I am no longer doing fusions from C2-T2 and I think that she would do well working with someone who has an understanding of how to use ultrasound to protect the spinal cord on the way in and who also has the expertise of handling the situation postoperatively.  Fortunately I have a partner Dr. Mcgraw who has all of these skill sets and this is one of her areas of expertise.  I told the patient and her  that just because I told her that this is something that would be a reasonable approach that does not mean that is what Dr. Mcgraw is going to be offering.      I have reviewed all prior documentation and reviewed the electronic medical record since admission. I have personally have reviewed all advanced imaging not just the reports and used my interpretation as documented as the relevant findings. I have reviewed the risks and benefits of all treatment recommendations listed in this note with the patient and family. I spent a total of 40 minutes in service to this patient's care during this date of service.      The above clinical summary has been dictated with voice recognition software. It has not been proofread for grammatical errors, typographical mistakes, or other semantic inconsistencies.    Thank you for visiting our office today. It was our pleasure to take part in your healthcare.     Do not hesitate to call with any questions regarding your plan of care after leaving. My office can be reached at [phone] M-F 8am-4pm.     To clinicians, thank you very much for this kind  referral. It is a privilege to partner with you in the care of your patients. My office would be delighted to assist you with any further consultations or with questions regarding the plan of care outlined. Do not hesitate to call the office or contact me directly.     Sincerely,    Jerome Recio MD, FAANS, FACS  Board Certified Neurological Surgeon  , Department of Neurological Surgery  [employer]    [facility]  [address]  [facility] [address]  [address]  [address] [facility] [address] [place]  [address] [place] [address]    Phone: [phone]  Fax: [phone]

## 2024-07-16 ENCOUNTER — APPOINTMENT (OUTPATIENT)
Dept: PHYSICAL THERAPY | Facility: CLINIC | Age: 74
End: 2024-07-16
Payer: MEDICARE

## 2024-07-24 DIAGNOSIS — G89.29 CHRONIC BILATERAL LOW BACK PAIN WITHOUT SCIATICA: ICD-10-CM

## 2024-07-24 DIAGNOSIS — M54.50 CHRONIC BILATERAL LOW BACK PAIN WITHOUT SCIATICA: ICD-10-CM

## 2024-07-24 RX ORDER — HYDROCODONE BITARTRATE AND ACETAMINOPHEN 5; 325 MG/1; MG/1
1 TABLET ORAL 3 TIMES DAILY PRN
Qty: 20 TABLET | Refills: 0 | Status: SHIPPED | OUTPATIENT
Start: 2024-07-24

## 2024-07-27 DIAGNOSIS — E78.5 HYPERLIPIDEMIA, UNSPECIFIED HYPERLIPIDEMIA TYPE: ICD-10-CM

## 2024-07-29 RX ORDER — ATORVASTATIN CALCIUM 20 MG/1
20 TABLET, FILM COATED ORAL DAILY
Qty: 90 TABLET | Refills: 3 | Status: SHIPPED | OUTPATIENT
Start: 2024-07-29

## 2024-07-31 NOTE — ED PROVIDER NOTES
Referring Provider: Jef Jara MD  Reason for Consult: Bradycardia    History of Present Illness:      Hugo Weaver is a 89 y.o. year old male patient with a history significant for hypertension, CAD s/p CABG in 2003, permanent atrial fibrillation, ischemic cardiomyopathy mildly reduced ejection fraction 45-50%, AAA s/p repair, diabetes, and CKD  who was referred by Dr. Jara for consideration of pacemaker for symptomatic bradycardia. He is here for follow-up today.    After his last visit, he agreed to move forward with CRT-D implantation.  We successfully implanted a CRT-P on 4/16/2024.  The RV lead was placed in the left bundle area, and the LV lead was placed out a basal lateral coronary sinus branch.  He has done well since pacemaker implantation, though he compares of some viral type syndromes including head congestion and cough.  He has also had unintentional weight loss.  He notes some dizziness when he goes from a seated position to standing position.  Otherwise, no specific complaints regarding the pacemaker.    Focused Cardiovascular Problem List:  Permanent Atrial Fibrillation: [id number] = 6. Anticoagulated on Xarelto with lower dose of 15mg due to kidney function. Rate-controlled/bradycardic off any AV adrian agents.  Status post dual-chamber Medtronic pacemaker  Status post Medtronic CRT-P for tachybradycardia syndrome  CAD s/p CABG  Ischemic cardiomyopathy with  mildly reduced ejection fraction (45-50% most recent, previously as low as 35%): Unable to tolerate beta-blockers. On ACE-I and MRA.   Type 2 DM: Currently diet controlled  Hypertension  CKD      Past Medical and Surgical History:  Mr. Weaver  has a past medical history of Atrial fibrillation (Multi), Cataract, Chronic kidney disease, Coronary artery disease, Diabetes mellitus (Multi), Dyspnea, unspecified, Heart disease, HL (hearing loss), Hyperlipidemia, Hypertension, Ischemic cardiomyopathy, Other specified postprocedural states,  HPI   Chief Complaint   Patient presents with    Fall     Pt states that she was on the floor moving furniture and fell back hitting the top of her head on wood - pt has had past neck surgeries and is c/o head pain - no visible injuries or swelling on top of head    Headache       CC: Head injury  HPI:   73-year-old female presents ED after falling backwards while she was trying to dislodge a piece of carpet hitting her head on a piece of wood from her couch she denies any loss of consciousness, patient reports mild headache, she notes prior history of cervical spine surgery and chronically has posterior lateral cervical tenderness, she denies any nausea vomiting no chest pain or shortness of breath denies having any abdominal pain denies any upper/lower extremity weakness numbness paresthesia.  Denies any midline thoracic or lumbar tenderness.  No visual acuity changes.    Additional Limitations to History:   External Records Reviewed: I reviewed recent and relevant outside records including   History Obtained From:     Past Medical History: Per HPI  Medications: Reviewed in EMR and with patient  Allergies:  Reviewed in EMR  Past Surgical History:   Social History:     ------------------------------------------------------------------------------------------------------  Physical Exam:  --Vital signs reviewed in nursing triage note, EMR flow sheets, and at patient's bedside  GEN:  A&Ox3, no acute distress, appears comfortable.  Conversational and appropriate.  No confusion or gross mental status changes.  EYES: EOMI, non-injected sclera.  ENT: Moist mucous membranes, no apparent injuries or lesions.   CARDIO: Normal rate and regular rhythm. No murmurs, rubs, or gallops.  2+ equal pulses of the distal extremities.   PULM: Clear to auscultation bilaterally. No rales, rhonchi, or wheezes. Good symmetric chest expansion.  GI: Soft, non-tender, non-distended. No rebound tenderness or guarding.  SKIN: Warm and dry, no  Personal history of other diseases of the circulatory system, Personal history of other diseases of the circulatory system, Personal history of other diseases of the circulatory system, Personal history of other diseases of the digestive system (2018), Personal history of other endocrine, nutritional and metabolic disease, Personal history of other endocrine, nutritional and metabolic disease, Personal history of other endocrine, nutritional and metabolic disease, Personal history of other medical treatment, Personal history of other specified conditions (2015), and Valvular heart disease.    has a past surgical history that includes Other surgical history (2014); Other surgical history (2018); Other surgical history (2019); Other surgical history (2019); CT angio abdomen pelvis w and or wo IV IV contrast (2020); Coronary artery bypass graft (10/2005); Cholecystectomy (10/2018); and Cardiac electrophysiology procedure (N/A, 2024).    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 [employer]  Other: Wife passed away from Parkinson's in     Relevant Family History:   Family History   Problem Relation Name Age of Onset    Diabetes Mother Clemencia     Other (Cardiac disorder) Mother Clemencia     Heart disease Mother Clemencia     Hypertension Mother Clemencia     Arthritis Mother Clemencia     Diabetes type II Mother Clemencia     No Known Problems Father       Family History of Sudden Death: YesFather  of AAA rupture       Allergies:  Allergies   Allergen Reactions    Atorvastatin Unknown    Simvastatin Unknown        Medications:  Current Outpatient Medications   Medication Instructions    acetaminophen (TYLENOL) 650 mg, oral, Every  rashes or lesions.  MSK: ROM intact the extremities without contractures.   EXT: No peripheral edema, contusions, or wounds.   NEURO: Cranial nerves II-XII grossly intact. Sensation to light touch intact and equal bilaterally in upper and lower extremities.  Symmetric 5/5 strength in upper and lower extremities.  PSYCH: Appropriate mood and behavior, converses and responds appropriately during exam.  -------------------------------------------------------------------------------------------------------      Differential Diagnoses Considered:   Chronic Medical Conditions Significantly Affecting Care:   Diagnostic testing considered: [PERC, D-Dimer, PECARN, etc.]      - I independently interpreted: [CXR, CT, POCUS, etc. including your interpretation]  - Labs notable for     Escalation of Care: Appropriate for   Social Determinants of Health Significantly Affecting Care: [Homelessness, lacking transportation, uninsured, unable to afford medications]  Prescription Drug Consideration: [Antibiotics, antivirals, pain medications, etc.]  Discussion of Management with Other Providers:  I discussed the patient/results with: [admitting team, consultant, radiologist, social work, EPAT, case management, PT/OT, RT, PCP, etc.]      Blaine Arizmendi PA-C                          No data recorded                Patient History   Past Medical History:   Diagnosis Date    Allergic     Arthritis     COPD (chronic obstructive pulmonary disease) (CMS/[facility])     Disease of thyroid gland     Diverticulosis of intestine, part unspecified, without perforation or abscess without bleeding     Diverticulosis    Emphysema of lung (CMS/[facility])     GERD (gastroesophageal reflux disease)     Heart murmur     Hypertension     Inflammatory bowel disease     Obesity, unspecified     Adult-onset obesity    Personal history of other diseases of the circulatory system     History of hypertension    Personal history of other diseases of the circulatory system  "4 hours PRN    ascorbic acid (Vitamin C) 1,000 mg tablet 1 tablet, oral, Daily    azaTHIOprine (Imuran) 50 mg tablet Take 2.5 tablets daily.    bumetanide (BUMEX) 2 mg, oral, Daily PRN    enalapril (VASOTEC) 5 mg, oral, Nightly    metoprolol succinate XL (TOPROL-XL) 50 mg, oral, Daily, Do not crush or chew.    omega 3-dha-epa-fish oil (Fish OiL) 1,000 mg (120 mg-180 mg) capsule 1 capsule, oral, Daily    ondansetron ODT (ZOFRAN-ODT) 4 mg, oral, Every 8 hours PRN    pravastatin (PRAVACHOL) 20 mg, oral, Nightly    rivaroxaban (XARELTO) 15 mg, oral, Daily with evening meal, Hold for 2 days and resume on 4/19/24    spironolactone (ALDACTONE) 25 mg, oral, Daily    tamsulosin (FLOMAX) 0.4 mg, oral, Nightly    vitamin E 400 Units, oral, Daily         Objective   Physical Exam:  Last Recorded Vitals:      4/17/2024     9:13 AM 4/17/2024     2:56 PM 4/26/2024     3:43 PM 5/1/2024    10:30 AM 5/8/2024     2:01 PM 5/15/2024    12:13 PM 7/31/2024    10:12 AM   Vitals   Systolic 130 132 118 134 122 142 104   Diastolic 60 69 66 70 60 78 62   Heart Rate  76 68 79 74 87 87   Temp 36.2 °C (97.2 °F) 36.4 °C (97.5 °F) 36.4 °C (97.6 °F) 36.3 °C (97.3 °F) 36.8 °C (98.2 °F) 36.4 °C (97.5 °F) 36 °C (96.8 °F)   Resp  18 20 20 20     Height (in)       1.854 m (6' 1\")   Weight (lb)       152   BMI       20.05 kg/m2   BSA (m2)       1.88 m2   Visit Report       Report    Visit Vitals  /62 (BP Location: Left arm, Patient Position: Sitting)   Pulse 87   Temp 36 °C (96.8 °F) (Temporal)   Ht 1.854 m (6' 1\")   Wt 68.9 kg (152 lb)   SpO2 95%   BMI 20.05 kg/m²   Smoking Status Former   BSA 1.88 m²      Gen: NAD, sitting comfortably  HEENT: NC/AT  Chest: Device in situ, no overlying erythema, tenderness, or irritation  Card: RRR, 2/6 HSM, 1/4 diastolic murmur  Pulm: Clear to auscultation bilaterally  Ext: No LE edema  Neuro: No focal deficits    Diagnostic Results      My Interpretation of Reviewed Study(s):  Prior ECGs (reviewed and my " 10/09/2020    History of hypertension    Personal history of other diseases of the circulatory system     History of cardiac murmur    Personal history of other diseases of the digestive system     History of gastroesophageal reflux (GERD)    Personal history of other diseases of the digestive system     History of irritable bowel syndrome    Personal history of other diseases of the musculoskeletal system and connective tissue     History of arthritis    Personal history of other diseases of the musculoskeletal system and connective tissue     History of arthritis    Personal history of other diseases of the musculoskeletal system and connective tissue     History of fibromyalgia    Personal history of other diseases of the respiratory system     History of chronic obstructive lung disease    Personal history of other diseases of the respiratory system     History of pulmonary emphysema    Personal history of other endocrine, nutritional and metabolic disease     History of thyroid disorder     Past Surgical History:   Procedure Laterality Date    APPENDECTOMY  04/27/2018    Appendectomy    BACK SURGERY  10/12/2017    Back Surgery    CARDIAC CATHETERIZATION      CARDIAC SURGERY  10/12/2017    Heart Surgery    CHOLECYSTECTOMY  05/24/2016    Cholecystectomy    ENDOMETRIAL ABLATION      JOINT REPLACEMENT      OTHER SURGICAL HISTORY  06/15/2022    Knee replacement    OTHER SURGICAL HISTORY  06/15/2022    Endometrial ablation    OTHER SURGICAL HISTORY  04/27/2018    Previous Stent Placement    OTHER SURGICAL HISTORY  04/27/2018    Abdominal Surgery     Family History   Problem Relation Name Age of Onset    Cancer Mother JEFRYRIN GRANT     Hypertension Mother JEFRY JUANITA     Cancer Father ISA GRANT JR     Hypertension Father ISA GRANT JR     Leukemia Father ISA GRANT JR     Hypertension Sister      Hyperlipidemia Sister      Kidney disease Brother ISA GRANT JR     Stroke Brother ISA GRANT JR     Other  (ischemic heart disease) Other       Social History     Tobacco Use    Smoking status: Former     Packs/day: 2.00     Years: 30.00     Additional pack years: 0.00     Total pack years: 60.00     Types: Cigarettes     Start date: 1963     Quit date: 1996     Years since quittin.9    Smokeless tobacco: Never   Vaping Use    Vaping Use: Never used   Substance Use Topics    Alcohol use: Yes     Comment: RARELY    Drug use: Never       Physical Exam   ED Triage Vitals [23 1714]   Temp Heart Rate Resp BP   36.1 °C (97 °F) 66 16 (!) 218/84      SpO2 Temp Source Heart Rate Source Patient Position   98 % Tympanic Monitor Sitting      BP Location FiO2 (%)     Right arm --       Physical Exam    ED Course & MDM   Diagnoses as of 23 182   Closed head injury, initial encounter       Medical Decision Making  73-year-old female with mechanical fall minor head injury pending imaging, patient was turned over to the oncoming nurse practitioner for final disposition.  Patient is neurologically intact hemodynamically stable no neurodeficits on exam        Procedure  Procedures     Blaine Arizmendi PA-C  23 1821     interpretation):  7/31/2024(After programming changes with LV preexcitation by -40 ms): Ventricular paced rhythm, initial Q wave in lead I, initial R wave in lead V1 consistent with left ventricular activation, premature ventricular contractions, heart rate 94 bpm  7/31/2024 (prior to programming changes): Ventricular paced rhythm, frequent PVCs in a pattern of bigeminy, heart rate 70 bpm  1/18/2024: Atrial fibrillation, HR 70bpm, frequent PVCs  11/3/2023: Atrial Fibrillation, HR 65bpm, LBBB QRSd 162ms    Echocardiography:  11/3/2023  Transthoracic Echo (TTE) Complete    Result Date: 11/3/2023            [facility] [id number] [id number]    Tel [phone] Fax [phone] TRANSTHORACIC ECHOCARDIOGRAM REPORT  Patient Name:      VIPUL MORTENSEN ANAHI    Reading Physician:   [id number] Jarvis Freeman MD Study Date:        11/2/2023          Ordering Provider:   [id number] ESTER CARLSON MRN/PID:           [id number]           Fellow: Accession#:        [id number]       Nurse: Date of Birth/Age: 9/7/1930 / 89      Sonographer:         Bettie Hopkins DCS                    years Gender:            M                  Additional Staff: Height:            185.42 cm          Admit Date: Weight:            79.38 kg           Admission Status:    Inpatient - Routine BSA:               2.03 m2            Department Location: [facility] Echo Lab Blood Pressure: 132 /60 mmHg Study Type:    TRANSTHORACIC ECHO (TTE) COMPLETE Diagnosis/ICD: Other chest pain-R07.89 Indication:    chest pain  Study Detail: The following Echo studies were performed: 2D, M-Mode, Doppler and               color flow.  PHYSICIAN INTERPRETATION: Left Ventricle: Left ventricular systolic function is mildly decreased. There is global hypokinesis of the left ventricle with minor regional variations. The left ventricular cavity size is mildly dilated. Spectral Doppler shows an impaired  relaxation pattern of left ventricular diastolic filling. Left Atrium: The left atrium is severely dilated. Right Ventricle: The right ventricle is mildly enlarged. There is reduced right ventricular systolic function. Right Atrium: The right atrium is mild to moderately dilated. Aortic Valve: The aortic valve appears structurally normal. There is moderate aortic valve regurgitation. The peak instantaneous gradient of the aortic valve is 10.0 mmHg. The mean gradient of the aortic valve is 6.0 mmHg. Mitral Valve: The mitral valve is normal in structure. There is mild mitral valve regurgitation. Tricuspid Valve: The tricuspid valve is structurally normal. There is mild to moderate tricuspid regurgitation. Pulmonic Valve: The pulmonic valve was not assessed. The pulmonic valve regurgitation was not assessed. Pericardium: There is no pericardial effusion noted. Aorta: The aortic root is abnormal. There is mild dilatation of the aortic root.  CONCLUSIONS:  1. Left ventricular systolic function is mildly decreased.  2. Spectral Doppler shows an impaired relaxation pattern of left ventricular diastolic filling.  3. There is reduced right ventricular systolic function.  4. The left atrium is severely dilated.  5. The right atrium is mild to moderately dilated.  6. Mild to moderate tricuspid regurgitation.  7. Moderate aortic valve regurgitation.  8. Dilated left ventricle, EF 45-50% with global hypokinesis.  9. Moderate aortic valve regurgitation no significant stenosis. 10. Severely dilated left atrium with mild mitral regurgitation. 11. Right ventricle function is mildly reduced. 12. Patient wire noted in the right heart. 13. There is global hypokinesis of the left ventricle with minor regional variations. QUANTITATIVE DATA SUMMARY: 2D MEASUREMENTS:                           Normal Ranges: LAs:           4.80 cm    (2.7-4.0cm) IVSd:          1.13 cm    (0.6-1.1cm) LVPWd:         1.22 cm    (0.6-1.1cm) LVIDd:         5.99  cm    (3.9-5.9cm) LVIDs:         4.19 cm LV Mass Index: 149.7 g/m2 LV % FS        30.1 % LA VOLUME:                             Normal Ranges: LA Area A4C:     18.6 cm2 LA Area A2C:     26.9 cm2 LA Volume Index: 37.0 ml/m2 LA Vol A4C:      52.0 ml LA Vol A2C:      97.0 ml LA Vol BP:       75.0 ml M-MODE MEASUREMENTS:                  Normal Ranges: Ao Root: 4.20 cm (2.0-3.7cm) AoV Exc: 2.60 cm (1.5-2.5cm) AORTA MEASUREMENTS:                    Normal Ranges: AoV Exc:   2.60 cm (1.5-2.5cm) Asc Ao, d: 4.20 cm (2.1-3.4cm) LV SYSTOLIC FUNCTION BY 2D PLANIMETRY (MOD):                     Normal Ranges: EF-A4C View: 54.2 % (>=55%) EF-A2C View: 42.2 % EF-Biplane:  49.0 % LV DIASTOLIC FUNCTION:                        Normal Ranges: MV Peak E:    0.58 m/s (0.7-1.2 m/s) MV Peak A:    0.40 m/s (0.42-0.7 m/s) E/A Ratio:    1.46     (1.0-2.2) MV e'         0.09 m/s (>8.0) MV lateral e' 0.11 m/s MV medial e'  0.08 m/s E/e' Ratio:   6.67     (<8.0) MITRAL VALVE:                 Normal Ranges: MV DT: 311 msec (150-240msec) MITRAL INSUFFICIENCY:                      Normal Ranges: MR Vmax: 442.00 cm/s AORTIC VALVE:                                    Normal Ranges: AoV Vmax:                1.58 m/s  (<=1.7m/s) AoV Peak PG:             10.0 mmHg (<20mmHg) AoV Mean P.0 mmHg  (1.7-11.5mmHg) LVOT Max Dre:            0.80 m/s  (<=1.1m/s) AoV VTI:                 44.30 cm  (18-25cm) LVOT VTI:                22.40 cm LVOT Diameter:           2.30 cm   (1.8-2.4cm) AoV Area, VTI:           2.10 cm2  (2.5-5.5cm2) AoV Area,Vmax:           2.12 cm2  (2.5-4.5cm2) AoV Dimensionless Index: 0.51 AORTIC INSUFFICIENCY: AI Vmax:       3.06 m/s AI Half-time:  1078 msec AI Decel Rate: 88.33 cm/s2  RIGHT VENTRICLE: RV Basal 3.10 cm RV Mid   1.80 cm RV Major 7.5 cm TAPSE:   17.0 mm TRICUSPID VALVE/RVSP:                             Normal Ranges: Peak TR Velocity: 2.38 m/s RV Syst Pressure: 25.7 mmHg (< 30mmHg)  [id number] Jarvis Freeman MD  Electronically signed on 11/3/2023 at 11:40:47 AM  ** Final **       Stress Test:   12/3/2021  IMPRESSION:  1. No definite evidence of ischemia.  2. Suspicion of an infarct along the distal anterior/distal septal  wall extending into the apex.  3. Mild left ventricular dilatation is noted.  4. Left ventricular ejection fraction estimated at 53%.    Relevant Labs:  Lab Results   Component Value Date    CREATININE 1.47 (H) 05/09/2024    CREATININE 1.39 (H) 04/23/2024    CREATININE 1.26 04/18/2024    K 4.5 05/09/2024    K 4.5 04/23/2024    K 3.8 04/18/2024    HGBA1C 6.5 (H) 12/06/2023    HGBA1C 7.0 (A) 05/10/2023    HGBA1C 7.4 (A) 01/04/2023    HGB 12.8 (L) 05/09/2024    HGB 12.8 (L) 04/23/2024    HGB 13.2 (L) 04/18/2024    INR 1.2 (H) 04/16/2024    INR 1.9 (H) 04/11/2024    INR 2.5 (H) 12/27/2022    AST 14 05/09/2024    AST 11 01/23/2024    AST 13 01/22/2024    ALT 10 05/09/2024    ALT 8 (L) 01/23/2024    ALT 9 (L) 01/22/2024   Device interrogation: Please see full device interrogation in epic media tab. Based on the initial ECG, which showed no LV activation, I made programming changes on the device.  I switched the RV lead to unipolar pacing configuration, also preexcited the LV 40 ms.  This significantly improved the surface ECG activation and also narrow narrowed the QRS.  Secondly, he was complaining of some dizziness with orthostasis, so I increased his settings on rate response.  Hopefully this will help.    He is having frequent PVCs.  The counters on his device are not accurate.  However, he is getting much less than 90% CRT due to his frequent PVCs.  We will start metoprolol today and see if that helps with his PVCs.  If they continue despite metoprolol, then amiodarone could be considered for PVC suppression.  He will follow-up routinely in the device clinic.  Since we started a new medication today, we will have him follow back in 6 weeks.    Assessment/Plan   Assessment and Plan:  Hugo Weaver is a 89  y.o. year old male patient who was referred for management and evaluation of symptomatic bradycardia. He is doing well after CRT-P implantation (no atrial lead implanted due to permanent atrial fibrillation). Based on the initial ECG, which showed no LV activation, I made programming changes on the device.  I switched the RV lead to unipolar pacing configuration, also preexcited the LV 40 ms.  This significantly improved the surface ECG activation and also narrow narrowed the QRS.  Secondly, he was complaining of some dizziness with orthostasis, so I increased his settings on rate response.  Hopefully this will help.    He is having frequent PVCs.  The counters on his device are not accurate.  However, he is getting much less than 90% CRT due to his frequent PVCs.  We will start metoprolol today and see if that helps with his PVCs.  If they continue despite metoprolol, then amiodarone could be considered for PVC suppression.  He will follow-up routinely in the device clinic.  Since we started a new medication today, we will have him follow back in 6 weeks.    Return to Clinic:  After 6 weeks    Thank you very much for allowing me to participate in the care of this patient. Please do not hesitate to contact me with any further questions or concerns.    Andrez Gibbons MD  Clinical Cardiac Electrophysiologist  Director of Atrial Fibrillation Ablation, [facility]  Director of Ventricular Arrhythmias Research, [facility]  Office Phone Number: [phone]

## 2024-08-01 NOTE — PROGRESS NOTES
Galina Chao is a 74 y.o. year old female p/w cervical myelopathy.    14/14 systems reviewed and negative other than what is listed in the history of present illness        Past Medical History:   Diagnosis Date    Allergic     Arthritis     COPD (chronic obstructive pulmonary disease) (Multi)     Disease of thyroid gland     Diverticulosis of intestine, part unspecified, without perforation or abscess without bleeding     Diverticulosis    Emphysema of lung (Multi)     GERD (gastroesophageal reflux disease)     Heart murmur     Hypertension     Inflammatory bowel disease     Obesity, unspecified     Adult-onset obesity    Personal history of other diseases of the circulatory system     History of hypertension    Personal history of other diseases of the circulatory system 10/09/2020    History of hypertension    Personal history of other diseases of the circulatory system     History of cardiac murmur    Personal history of other diseases of the digestive system     History of gastroesophageal reflux (GERD)    Personal history of other diseases of the digestive system     History of irritable bowel syndrome    Personal history of other diseases of the musculoskeletal system and connective tissue     History of arthritis    Personal history of other diseases of the musculoskeletal system and connective tissue     History of arthritis    Personal history of other diseases of the musculoskeletal system and connective tissue     History of fibromyalgia    Personal history of other diseases of the respiratory system     History of chronic obstructive lung disease    Personal history of other diseases of the respiratory system     History of pulmonary emphysema    Personal history of other endocrine, nutritional and metabolic disease     History of thyroid disorder       Past Surgical History:   Procedure Laterality Date    APPENDECTOMY  04/27/2018    Appendectomy    BACK SURGERY  10/12/2017    Back Surgery    CARDIAC  CATHETERIZATION      CARDIAC SURGERY  10/12/2017    Heart Surgery    CHOLECYSTECTOMY  05/24/2016    Cholecystectomy    ENDOMETRIAL ABLATION      JOINT REPLACEMENT      OTHER SURGICAL HISTORY  06/15/2022    Knee replacement    OTHER SURGICAL HISTORY  06/15/2022    Endometrial ablation    OTHER SURGICAL HISTORY  04/27/2018    Previous Stent Placement    OTHER SURGICAL HISTORY  04/27/2018    Abdominal Surgery           Current Outpatient Medications:     albuterol 90 mcg/actuation inhaler, Inhale 2 puffs every 4 hours if needed for shortness of breath., Disp: , Rfl:     amLODIPine (Norvasc) 5 mg tablet, Take 1 tablet (5 mg) by mouth once daily., Disp: 90 tablet, Rfl: 3    aspirin 81 mg EC tablet, Take 1 tablet (81 mg) by mouth once daily., Disp: , Rfl:     atenolol (Tenormin) 25 mg tablet, Take 1 tablet (25 mg) by mouth once daily., Disp: 90 tablet, Rfl: 3    atorvastatin (Lipitor) 20 mg tablet, TAKE 1 TABLET BY MOUTH DAILY, Disp: 90 tablet, Rfl: 3    biotin 10 mg tablet, Take 2 tablets (20 mg) by mouth once daily., Disp: , Rfl:     calcium carbonate (CALCIUM 600 ORAL), 2 times a day., Disp: , Rfl:     cholecalciferol (Vitamin D-3) 50 mcg (2,000 unit) capsule, Take 1 capsule (50 mcg) by mouth early in the morning.., Disp: , Rfl:     dexAMETHasone 0.5 mg/5 mL elixir, Take every 6 hours, Disp: , Rfl:     diazePAM (Valium) 5 mg tablet, Take one tablet ONE HOUR BEFORE THE MRI. Then take another tablet on arrival to MRI facility, Disp: 2 tablet, Rfl: 0    eplerenone (Inspra) 25 mg tablet, Take 1 tablet (25 mg) by mouth once daily., Disp: 90 tablet, Rfl: 3    gabapentin (Neurontin) 300 mg capsule, Take 1 capsule (300 mg) by mouth 2 times a day., Disp: 180 capsule, Rfl: 3    HYDROcodone-acetaminophen (Norco) 5-325 mg tablet, Take 1 tablet by mouth 3 times a day as needed for severe pain (7 - 10)., Disp: 20 tablet, Rfl: 0    hydroxychloroquine (Plaquenil) 200 mg tablet, Take 2 tablets (400 mg) by mouth once daily., Disp: ,  Rfl:     Incruse Ellipta 62.5 mcg/actuation inhalation, Inhale 1 puff (62.5 mcg) once daily., Disp: 90 capsule, Rfl: 3    lansoprazole (Prevacid) 30 mg DR capsule, TAKE 1 CAPSULE BY MOUTH EVERY  MORNING, Disp: 90 capsule, Rfl: 3    levothyroxine (Synthroid, Levoxyl) 137 mcg tablet, TAKE 1 TABLET BY MOUTH 6 DAYS  PER WEEK, Disp: 78 tablet, Rfl: 3    minoxidil (Loniten) 2.5 mg tablet, Take 0.5 tablets (1.25 mg) by mouth once daily., Disp: 45 tablet, Rfl: 3    multivitamin-iron-minerals-folic acid (Multivitamin 50 Plus) tablet, Take 1 tablet by mouth once daily., Disp: , Rfl:     nortriptyline (Pamelor) 25 mg capsule, Take 2 capsules (50 mg) by mouth once daily., Disp: 180 capsule, Rfl: 3    zinc gluconate 50 mg tablet, Take 1 tablet (50 mg of elemental zinc) by mouth once daily., Disp: , Rfl:     zolpidem (Ambien) 10 mg tablet, TAKE 1 TABLET BY MOUTH DAILY AT  BEDTIME, Disp: 90 tablet, Rfl: 1      Vitals:    08/02/24 1019   BP: 130/70   Pulse: 66     Objective   General: Well developed, awake/alert/oriented x3, no distress, alert and cooperative  Skin: Warm and dry, no lesions, no rashes  ENMT: Mucous membranes moist, no apparent injury, no lesions seen  Head/Neck: Neck Supple, no apparent injury  Respiratory/Thorax: Normal breath sounds with good chest expansion, thorax symmetric  Cardiovascular: No pitting edema, no JVD    Motor Strength: 5/5 Throughout all extremities    Muscle Bulk: Normal and symmetric in all extremities    Posture:   -- Cervical: Normal  -- Thoracic: Normal  -- Lumbar : Normal  Paraspinal muscle spasm/tenderness absent.     Sensation: intact to light touch     Relevant Results    needs DEXA needs upright xrays, EOS CT C-spine: prior C4-7 lami MRI C-spine: C2-5 stenosis       Problem List Items Addressed This Visit    None  Visit Diagnoses       Cervical disc disease with myelopathy    -  Primary    Relevant Orders    XR cervical spine complete 6+ views    Senile osteoporosis        Relevant  Orders    XR DEXA bone density axial skeleton w VFA               Assessment/Plan       Ms. Galina Chao is a very nice 74 y.o. year old female presenting today with lower neck pain that is radiating to her left upper extremity. Her symptoms has been present for many years but started aggravating from an year ago. She mentions numbness in her left sided fingers. She mentions weakness/numbness in her upper extremities/ dropping of objects on and off/ difficulty opening jars. She also mentions having issues with her balance and mentions bending forward or looking down while walking.    She had a past history of previous surgery for her neck in the 80s with a cervical laminectomy.     We reviewed Mri scans together today showing adjacent segment stenosis with kyphotic position of the neck. On examination, she is moving all four limbs with full strength and has intact neurological examination.     In view of her symptoms, I believe surgery will be the best option for her to relieve her symptoms of cervical myelopathy. I would like to obtain upright xrays and EOS to assess her alignment. Discussed about the surgical options, benefits and risks which includes anesthesia risks, infection, spinal fluid leakage, bleeding,hematoma and injury to blood vessel or nerve in and around the spinal region.     We discussed that she will need a C2-T2 fusion with C2-5 decompression. I may also elect to do some portion of the surgery anteriorly if her kyphotic posture on upright xrays is extreme. I will let her know once her xrays are completed.     I have ordered DEXA scan to assess bone density. She will need a pulmonology and cardiology clearance prior to surgery. She will need to hold her ASA 81 prior to surgery and after surgery.       Shila Mcgraw MD    of Neurosurgery   [facility]   [facility]   Office: [phone]  Fax: [phone]      Scribe  Attestation  By signing my name below, I, Nusrat Jacobs , Scribkeri   attest that this documentation has been prepared under the direction and in the presence of Shila Mcgraw MD.

## 2024-08-02 ENCOUNTER — APPOINTMENT (OUTPATIENT)
Dept: NEUROSURGERY | Facility: CLINIC | Age: 74
End: 2024-08-02
Payer: MEDICARE

## 2024-08-02 VITALS
HEIGHT: 66 IN | DIASTOLIC BLOOD PRESSURE: 70 MMHG | BODY MASS INDEX: 29.99 KG/M2 | WEIGHT: 186.6 LBS | HEART RATE: 66 BPM | SYSTOLIC BLOOD PRESSURE: 130 MMHG

## 2024-08-02 DIAGNOSIS — D68.9 COAGULOPATHY (MULTI): ICD-10-CM

## 2024-08-02 DIAGNOSIS — M50.00 CERVICAL DISC DISEASE WITH MYELOPATHY: Primary | ICD-10-CM

## 2024-08-02 DIAGNOSIS — M81.0 SENILE OSTEOPOROSIS: ICD-10-CM

## 2024-08-02 DIAGNOSIS — D65: ICD-10-CM

## 2024-08-02 PROCEDURE — 1036F TOBACCO NON-USER: CPT | Performed by: STUDENT IN AN ORGANIZED HEALTH CARE EDUCATION/TRAINING PROGRAM

## 2024-08-02 PROCEDURE — 3078F DIAST BP <80 MM HG: CPT | Performed by: STUDENT IN AN ORGANIZED HEALTH CARE EDUCATION/TRAINING PROGRAM

## 2024-08-02 PROCEDURE — 1159F MED LIST DOCD IN RCRD: CPT | Performed by: STUDENT IN AN ORGANIZED HEALTH CARE EDUCATION/TRAINING PROGRAM

## 2024-08-02 PROCEDURE — 3075F SYST BP GE 130 - 139MM HG: CPT | Performed by: STUDENT IN AN ORGANIZED HEALTH CARE EDUCATION/TRAINING PROGRAM

## 2024-08-02 PROCEDURE — 3008F BODY MASS INDEX DOCD: CPT | Performed by: STUDENT IN AN ORGANIZED HEALTH CARE EDUCATION/TRAINING PROGRAM

## 2024-08-02 PROCEDURE — 99215 OFFICE O/P EST HI 40 MIN: CPT | Performed by: STUDENT IN AN ORGANIZED HEALTH CARE EDUCATION/TRAINING PROGRAM

## 2024-08-02 PROCEDURE — 1125F AMNT PAIN NOTED PAIN PRSNT: CPT | Performed by: STUDENT IN AN ORGANIZED HEALTH CARE EDUCATION/TRAINING PROGRAM

## 2024-08-02 ASSESSMENT — PATIENT HEALTH QUESTIONNAIRE - PHQ9
SUM OF ALL RESPONSES TO PHQ9 QUESTIONS 1 AND 2: 0
1. LITTLE INTEREST OR PLEASURE IN DOING THINGS: NOT AT ALL
2. FEELING DOWN, DEPRESSED OR HOPELESS: NOT AT ALL

## 2024-08-02 ASSESSMENT — PAIN SCALES - GENERAL: PAINLEVEL: 8

## 2024-08-03 ENCOUNTER — HOSPITAL ENCOUNTER (OUTPATIENT)
Facility: HOSPITAL | Age: 74
Setting detail: SURGERY ADMIT
End: 2024-08-03
Attending: STUDENT IN AN ORGANIZED HEALTH CARE EDUCATION/TRAINING PROGRAM | Admitting: STUDENT IN AN ORGANIZED HEALTH CARE EDUCATION/TRAINING PROGRAM
Payer: MEDICARE

## 2024-08-03 PROBLEM — M81.0 SENILE OSTEOPOROSIS: Status: ACTIVE | Noted: 2024-08-02

## 2024-08-03 PROBLEM — M50.00 CERVICAL DISC DISEASE WITH MYELOPATHY: Status: ACTIVE | Noted: 2024-08-02

## 2024-08-03 RX ORDER — CEFAZOLIN SODIUM 2 G/100ML
2 INJECTION, SOLUTION INTRAVENOUS ONCE
OUTPATIENT
Start: 2024-08-03 | End: 2024-08-03

## 2024-08-06 ENCOUNTER — APPOINTMENT (OUTPATIENT)
Dept: PAIN MEDICINE | Facility: CLINIC | Age: 74
End: 2024-08-06
Payer: MEDICARE

## 2024-08-06 ENCOUNTER — APPOINTMENT (OUTPATIENT)
Dept: RADIOLOGY | Facility: CLINIC | Age: 74
End: 2024-08-06
Payer: MEDICARE

## 2024-08-12 ENCOUNTER — TELEPHONE (OUTPATIENT)
Dept: PRIMARY CARE | Facility: CLINIC | Age: 74
End: 2024-08-12
Payer: MEDICARE

## 2024-08-12 DIAGNOSIS — N39.0 URINARY TRACT INFECTION WITHOUT HEMATURIA, SITE UNSPECIFIED: Primary | ICD-10-CM

## 2024-08-12 RX ORDER — NITROFURANTOIN 25; 75 MG/1; MG/1
100 CAPSULE ORAL 2 TIMES DAILY
Qty: 10 CAPSULE | Refills: 0 | Status: SHIPPED | OUTPATIENT
Start: 2024-08-12 | End: 2024-08-17

## 2024-08-12 NOTE — PROGRESS NOTES
Subjective   Reason for Visit: Galina Chao is an 74 y.o. female here for a Medicare Wellness visit.               HPI    Patient Care Team:  Juvencio Gonzalez DO as PCP - General (Internal Medicine)  Iban Ashley MD as Anesthesiologist (Pain Medicine)     Review of Systems    Objective   Vitals:  LMP 09/30/2023       Physical Exam    Assessment/Plan   Problem List Items Addressed This Visit    None

## 2024-08-26 ENCOUNTER — TELEPHONE (OUTPATIENT)
Dept: PRIMARY CARE | Facility: CLINIC | Age: 74
End: 2024-08-26
Payer: MEDICARE

## 2024-08-26 DIAGNOSIS — M54.50 CHRONIC BILATERAL LOW BACK PAIN WITHOUT SCIATICA: ICD-10-CM

## 2024-08-26 DIAGNOSIS — G89.29 CHRONIC BILATERAL LOW BACK PAIN WITHOUT SCIATICA: ICD-10-CM

## 2024-08-26 RX ORDER — HYDROCODONE BITARTRATE AND ACETAMINOPHEN 5; 325 MG/1; MG/1
1 TABLET ORAL 3 TIMES DAILY PRN
Qty: 20 TABLET | Refills: 0 | Status: SHIPPED | OUTPATIENT
Start: 2024-08-26

## 2024-08-27 ENCOUNTER — TELEPHONE (OUTPATIENT)
Dept: CARDIOLOGY | Facility: CLINIC | Age: 74
End: 2024-08-27
Payer: MEDICARE

## 2024-08-27 NOTE — TELEPHONE ENCOUNTER
Pt is calling and asking cardiac clearance for her neck surgery on 9/13/24 with Dr. Peter Stallings  [phone]  [phone]

## 2024-08-28 NOTE — TELEPHONE ENCOUNTER
Sent pt a SpinNote message this morning with required information needed from surgeon's office (see SpinNote message).

## 2024-08-30 ENCOUNTER — APPOINTMENT (OUTPATIENT)
Dept: NEUROSURGERY | Facility: CLINIC | Age: 74
End: 2024-08-30
Payer: MEDICARE

## 2024-09-10 ENCOUNTER — HOSPITAL ENCOUNTER (OUTPATIENT)
Dept: RADIOLOGY | Facility: CLINIC | Age: 74
Discharge: HOME | End: 2024-09-10
Payer: MEDICARE

## 2024-09-10 VITALS — HEIGHT: 65 IN | BODY MASS INDEX: 29.99 KG/M2 | WEIGHT: 180 LBS

## 2024-09-10 DIAGNOSIS — Z12.31 VISIT FOR SCREENING MAMMOGRAM: ICD-10-CM

## 2024-09-10 PROCEDURE — 77063 BREAST TOMOSYNTHESIS BI: CPT | Performed by: RADIOLOGY

## 2024-09-10 PROCEDURE — 77067 SCR MAMMO BI INCL CAD: CPT | Performed by: RADIOLOGY

## 2024-09-10 PROCEDURE — 77067 SCR MAMMO BI INCL CAD: CPT

## 2024-10-08 ENCOUNTER — APPOINTMENT (OUTPATIENT)
Facility: CLINIC | Age: 74
End: 2024-10-08
Payer: MEDICARE

## 2024-10-30 PROBLEM — I83.90 VARICOSE VEINS OF LOWER EXTREMITY: Status: RESOLVED | Noted: 2023-08-16 | Resolved: 2024-10-30

## 2024-11-04 ENCOUNTER — APPOINTMENT (OUTPATIENT)
Dept: PRIMARY CARE | Facility: CLINIC | Age: 74
End: 2024-11-04
Payer: MEDICARE

## 2024-11-04 VITALS
HEART RATE: 67 BPM | DIASTOLIC BLOOD PRESSURE: 60 MMHG | BODY MASS INDEX: 30.82 KG/M2 | SYSTOLIC BLOOD PRESSURE: 134 MMHG | OXYGEN SATURATION: 98 % | WEIGHT: 185 LBS | HEIGHT: 65 IN

## 2024-11-04 DIAGNOSIS — N39.0 URINARY TRACT INFECTION WITHOUT HEMATURIA, SITE UNSPECIFIED: ICD-10-CM

## 2024-11-04 DIAGNOSIS — Z13.31 DEPRESSION SCREEN: ICD-10-CM

## 2024-11-04 DIAGNOSIS — F51.01 PRIMARY INSOMNIA: ICD-10-CM

## 2024-11-04 DIAGNOSIS — M54.50 CHRONIC BILATERAL LOW BACK PAIN WITHOUT SCIATICA: ICD-10-CM

## 2024-11-04 DIAGNOSIS — Z71.89 ACP (ADVANCE CARE PLANNING): ICD-10-CM

## 2024-11-04 DIAGNOSIS — G89.29 CHRONIC BILATERAL LOW BACK PAIN WITHOUT SCIATICA: ICD-10-CM

## 2024-11-04 DIAGNOSIS — Z00.00 ROUTINE GENERAL MEDICAL EXAMINATION AT HEALTH CARE FACILITY: Primary | ICD-10-CM

## 2024-11-04 PROCEDURE — 3075F SYST BP GE 130 - 139MM HG: CPT | Performed by: STUDENT IN AN ORGANIZED HEALTH CARE EDUCATION/TRAINING PROGRAM

## 2024-11-04 PROCEDURE — 99397 PER PM REEVAL EST PAT 65+ YR: CPT | Performed by: STUDENT IN AN ORGANIZED HEALTH CARE EDUCATION/TRAINING PROGRAM

## 2024-11-04 PROCEDURE — 1158F ADVNC CARE PLAN TLK DOCD: CPT | Performed by: STUDENT IN AN ORGANIZED HEALTH CARE EDUCATION/TRAINING PROGRAM

## 2024-11-04 PROCEDURE — 1170F FXNL STATUS ASSESSED: CPT | Performed by: STUDENT IN AN ORGANIZED HEALTH CARE EDUCATION/TRAINING PROGRAM

## 2024-11-04 PROCEDURE — G0439 PPPS, SUBSEQ VISIT: HCPCS | Performed by: STUDENT IN AN ORGANIZED HEALTH CARE EDUCATION/TRAINING PROGRAM

## 2024-11-04 PROCEDURE — 3008F BODY MASS INDEX DOCD: CPT | Performed by: STUDENT IN AN ORGANIZED HEALTH CARE EDUCATION/TRAINING PROGRAM

## 2024-11-04 PROCEDURE — 1123F ACP DISCUSS/DSCN MKR DOCD: CPT | Performed by: STUDENT IN AN ORGANIZED HEALTH CARE EDUCATION/TRAINING PROGRAM

## 2024-11-04 PROCEDURE — 1036F TOBACCO NON-USER: CPT | Performed by: STUDENT IN AN ORGANIZED HEALTH CARE EDUCATION/TRAINING PROGRAM

## 2024-11-04 PROCEDURE — 1159F MED LIST DOCD IN RCRD: CPT | Performed by: STUDENT IN AN ORGANIZED HEALTH CARE EDUCATION/TRAINING PROGRAM

## 2024-11-04 PROCEDURE — 3078F DIAST BP <80 MM HG: CPT | Performed by: STUDENT IN AN ORGANIZED HEALTH CARE EDUCATION/TRAINING PROGRAM

## 2024-11-04 RX ORDER — ZOLPIDEM TARTRATE 10 MG/1
10 TABLET ORAL NIGHTLY
Qty: 90 TABLET | Refills: 1 | Status: SHIPPED | OUTPATIENT
Start: 2024-11-04

## 2024-11-04 RX ORDER — NITROFURANTOIN 25; 75 MG/1; MG/1
100 CAPSULE ORAL 2 TIMES DAILY
Qty: 10 CAPSULE | Refills: 0 | Status: SHIPPED | OUTPATIENT
Start: 2024-11-04 | End: 2024-11-09

## 2024-11-04 ASSESSMENT — ACTIVITIES OF DAILY LIVING (ADL)
BATHING: INDEPENDENT
DRESSING: INDEPENDENT
GROCERY_SHOPPING: INDEPENDENT
DOING_HOUSEWORK: INDEPENDENT
MANAGING_FINANCES: INDEPENDENT
TAKING_MEDICATION: INDEPENDENT

## 2024-11-04 ASSESSMENT — ENCOUNTER SYMPTOMS
DEPRESSION: 0
OCCASIONAL FEELINGS OF UNSTEADINESS: 0
LOSS OF SENSATION IN FEET: 0

## 2024-11-04 NOTE — PROGRESS NOTES
Subjective   Patient ID: Galina Chao is a 74 y.o. female who presents for Medicare Annual Wellness Visit Subsequent.    HPI     Primary insomnia Long history of this, on Ambien doing well medical floor every 6 months.     Patient has no chronic back issues.  She does take some  patient has 1-2 refills a year just takes only as needed.  From her old doctor.  She has pain management as well for this.     COPD does follow with pulmonology doing well very mild case.    OARRS:  No data recorded  I have personally reviewed the OARRS report for Galina Chao. I have considered the risks of abuse, dependence, addiction and diversion    Is the patient prescribed a combination of a benzodiazepine and opioid?  No    Last Urine Drug Screen / ordered today: No  Recent Results (from the past 8760 hours)   Confirmation Opiate/Opioid/Benzo Prescription Compliance    Collection Time: 06/10/24 12:01 PM   Result Value Ref Range    Clonazepam <25 <25 ng/mL    7-Aminoclonazepam <25 <25 ng/mL    Alprazolam <25 <25 ng/mL    Alpha-Hydroxyalprazolam <25 <25 ng/mL    Midazolam <25 <25 ng/mL    Alpha-Hydroxymidazolam <25 <25 ng/mL    Chlordiazepoxide <25 <25 ng/mL    Diazepam <25 <25 ng/mL    Nordiazepam <25 <25 ng/mL    Temazepam <25 <25 ng/mL    Oxazepam <25 <25 ng/mL    Lorazepam <25 <25 ng/mL    Methadone <25 <25 ng/mL    EDDP <25 <25 ng/mL    6-Acetylmorphine <25 <25 ng/mL    Codeine <50 <50 ng/mL    Hydrocodone <25 <25 ng/mL    Hydromorphone <25 <25 ng/mL    Morphine  <50 <50 ng/mL    Norhydrocodone <25 <25 ng/mL    Noroxycodone <25 <25 ng/mL    Oxycodone <25 <25 ng/mL    Oxymorphone <25 <25 ng/mL    Fentanyl <2.5 <2.5 ng/mL    Norfentanyl <2.5 <2.5 ng/mL    Tramadol <50 <50 ng/mL    O-Desmethyltramadol <50 <50 ng/mL    Zolpidem <25 <25 ng/mL    Zolpidem Metabolite (ZCA) 441 (H) <25 ng/mL   Screen Opiate/Opioid/Benzo Prescription Compliance    Collection Time: 06/10/24 12:01 PM   Result Value Ref Range    Creatinine, Urine  "Random 36.0 20.0 - 320.0 mg/dL    Amphetamine Screen, Urine Presumptive Negative Presumptive Negative    Barbiturate Screen, Urine Presumptive Negative Presumptive Negative    Cannabinoid Screen, Urine Presumptive Negative Presumptive Negative    Cocaine Metabolite Screen, Urine Presumptive Negative Presumptive Negative    PCP Screen, Urine Presumptive Negative Presumptive Negative     Results are as expected.         Controlled Substance Agreement:  Date of the Last Agreement: 2024  Reviewed Controlled Substance Agreement including but not limited to the benefits, risks, and alternatives to treatment with a Controlled Substance medication(s).    Sleep Aids:   What is the patient's goal of therapy? sleep  Is this being achieved with current treatment? yes    Activities of Daily Living:   Is your overall impression that this patient is benefiting (symptom reduction outweighs side effects) from sleep aid therapy? Yes     1. Physical Functioning: Better  2. Family Relationship: Better  3. Social Relationship: Better  4. Mood: Better  5. Sleep Patterns: Better  6. Overall Function: Better      Review of Systems   All other systems reviewed and are negative.      Objective   /60 (BP Location: Left arm, Patient Position: Sitting, BP Cuff Size: Small adult)   Pulse 67   Ht 1.651 m (5' 5\")   Wt 83.9 kg (185 lb)   LMP 09/30/2023   SpO2 98%   BMI 30.79 kg/m²     Physical Exam  Constitutional:       Appearance: Normal appearance.   HENT:      Head: Normocephalic and atraumatic.      Right Ear: Tympanic membrane and ear canal normal.      Left Ear: Tympanic membrane and ear canal normal.      Mouth/Throat:      Mouth: Mucous membranes are moist.      Pharynx: Oropharynx is clear.   Eyes:      Extraocular Movements: Extraocular movements intact.      Conjunctiva/sclera: Conjunctivae normal.      Pupils: Pupils are equal, round, and reactive to light.   Cardiovascular:      Rate and Rhythm: Normal rate and regular " rhythm.      Pulses: Normal pulses.      Heart sounds: Normal heart sounds.   Pulmonary:      Effort: Pulmonary effort is normal.      Breath sounds: Normal breath sounds.   Abdominal:      General: Abdomen is flat. Bowel sounds are normal.      Palpations: Abdomen is soft.   Musculoskeletal:         General: Normal range of motion.      Cervical back: Normal range of motion and neck supple.   Skin:     General: Skin is warm and dry.      Capillary Refill: Capillary refill takes 2 to 3 seconds.   Neurological:      General: No focal deficit present.      Mental Status: She is alert and oriented to person, place, and time. Mental status is at baseline.   Psychiatric:         Mood and Affect: Mood normal.         Behavior: Behavior normal.         Thought Content: Thought content normal.         Judgment: Judgment normal.       Assessment/Plan   1. Chronic bilateral low back pain without sciatica  Stable  Sees pain management   Recent surgeries for back    2. Primary insomnia  Ambien refill  Csa o nfile  Uds   Oarrs reviewed    3. Routine general medical examination at health care facility (Primary)    - 1 Year Follow Up In Primary Care - Wellness Exam; Future    4. Depression screen  denies    5. ACP (advance care planning)  Lviing will  Hcpoas  Dnr cca      Tobacco/Alcohol/Opioid use, as well as Illicit Drug Use was screened for/reviewed and documented in Social Documentation section of the chart and medication list as appropriate    Depession Screening  Depression screening completed using the PHQ-2 questions, with results documented in the chart/encounter (~15min).  (See Rooming Screening section for documentation, or progress note for additional information)    Cardiac Risk Assessment  The 10-year ASCVD risk score (Wendy LEONARDO, et al., 2019) is: 17.7%    Values used to calculate the score:      Age: 74 years      Sex: Female      Is Non- : No      Diabetic: No      Tobacco smoker: No       Systolic Blood Pressure: 134 mmHg      Is BP treated: Yes      HDL Cholesterol: 51.3 mg/dL      Total Cholesterol: 138 mg/dL  Cardiovascular risk was discussed and, if needed, lifestyle modifications recommended, including nutritional choices, exercise, and elimination of habits contributing to risk. We agreed on a plan to reduce the current cardiovascular risk based on above discussion as needed.     Aspirin use/disuse was discussed and documented in the Problem List of the medical record (under Cardiac Risk Counseling) after reviewing the updated guidelines below:  Consider low dose Aspirin ( mg) use if the benefit for cardiovascular disease prevention outweighs risk for bleeding complications.   In general, low dose ASA should be considered:  In patients WITHOUT prior MI/stroke/PAD (primary prevention):   a. Age <60: Use if 10-year cardiovascular disease risk >20%, with discussion of risks and benefits with patient  b. Age 60-<70: Use if 10-year cardiovascular disease risk >20% and low bleeding (e.g., gastrointestinal) risk, with discussion of risks and benefits with patient  c. Age >=70: Do not use    In patients WITH prior MI/stroke/PAD (secondary prevention):   Generally use unless extremely high bleeding (e.g., gastrointenstinal) risk, with discussion of risks and benefits with patient    Advance Directives Discussion  Advanced Care Planning (including a Living Will, Healthcare POA, as well as specific end of life choices and/or directives), was discussed with the patient and/or surrogate, voluntarily, and details of that discussion documented in the Problem List (under Advanced Directives Discussion) of the medical record.    (~16 min spent discussing above)     During the course of the visit the patient was educated and counseled about age appropriate screening and preventive services.   Completed preventive screenings were documented in the chart (see Routine Health Maintenance in Problem List) and  orders were placed for outstanding screenings/procedures as documented in the Assessment and Plan.

## 2024-11-11 DIAGNOSIS — F51.01 PRIMARY INSOMNIA: ICD-10-CM

## 2024-11-12 RX ORDER — ZOLPIDEM TARTRATE 10 MG/1
10 TABLET ORAL NIGHTLY
Qty: 90 TABLET | Refills: 1 | Status: SHIPPED | OUTPATIENT
Start: 2024-11-12

## 2024-11-18 ENCOUNTER — OFFICE VISIT (OUTPATIENT)
Dept: PRIMARY CARE | Facility: CLINIC | Age: 74
End: 2024-11-18
Payer: MEDICARE

## 2024-11-18 VITALS
WEIGHT: 181 LBS | BODY MASS INDEX: 30.12 KG/M2 | DIASTOLIC BLOOD PRESSURE: 70 MMHG | HEART RATE: 80 BPM | SYSTOLIC BLOOD PRESSURE: 142 MMHG | OXYGEN SATURATION: 98 %

## 2024-11-18 DIAGNOSIS — E03.9 HYPOTHYROIDISM, UNSPECIFIED TYPE: ICD-10-CM

## 2024-11-18 DIAGNOSIS — K04.7 DENTAL ABSCESS: Primary | ICD-10-CM

## 2024-11-18 PROCEDURE — 3078F DIAST BP <80 MM HG: CPT | Performed by: STUDENT IN AN ORGANIZED HEALTH CARE EDUCATION/TRAINING PROGRAM

## 2024-11-18 PROCEDURE — 1159F MED LIST DOCD IN RCRD: CPT | Performed by: STUDENT IN AN ORGANIZED HEALTH CARE EDUCATION/TRAINING PROGRAM

## 2024-11-18 PROCEDURE — 99213 OFFICE O/P EST LOW 20 MIN: CPT | Performed by: STUDENT IN AN ORGANIZED HEALTH CARE EDUCATION/TRAINING PROGRAM

## 2024-11-18 PROCEDURE — 1123F ACP DISCUSS/DSCN MKR DOCD: CPT | Performed by: STUDENT IN AN ORGANIZED HEALTH CARE EDUCATION/TRAINING PROGRAM

## 2024-11-18 PROCEDURE — 3077F SYST BP >= 140 MM HG: CPT | Performed by: STUDENT IN AN ORGANIZED HEALTH CARE EDUCATION/TRAINING PROGRAM

## 2024-11-18 PROCEDURE — 1036F TOBACCO NON-USER: CPT | Performed by: STUDENT IN AN ORGANIZED HEALTH CARE EDUCATION/TRAINING PROGRAM

## 2024-11-18 RX ORDER — AMOXICILLIN AND CLAVULANATE POTASSIUM 875; 125 MG/1; MG/1
875 TABLET, FILM COATED ORAL 2 TIMES DAILY
Qty: 20 TABLET | Refills: 0 | Status: SHIPPED | OUTPATIENT
Start: 2024-11-18 | End: 2024-11-28

## 2024-11-18 RX ORDER — LEVOTHYROXINE SODIUM 137 UG/1
TABLET ORAL
Qty: 90 TABLET | Refills: 3 | Status: SHIPPED | OUTPATIENT
Start: 2024-11-18

## 2024-11-18 RX ORDER — AMOXICILLIN AND CLAVULANATE POTASSIUM 875; 125 MG/1; MG/1
875 TABLET, FILM COATED ORAL 2 TIMES DAILY
Qty: 20 TABLET | Refills: 0 | Status: SHIPPED | OUTPATIENT
Start: 2024-11-18 | End: 2024-11-18

## 2024-11-18 ASSESSMENT — ENCOUNTER SYMPTOMS: DEPRESSION: 0

## 2024-11-18 NOTE — PROGRESS NOTES
Subjective   Patient ID: Galina Chao is a 74 y.o. female who presents for mouth issue (White/hard lump in mouth RT side/Just found yesterday/Not painful ).    HPI     Dental bump some pain, noticed a few days ago. Sees a dentist in a a few weeks, hard, white, no pus or driange, recent cervical surgyer    Review of Systems   All other systems reviewed and are negative.      Objective   /70 (BP Location: Right arm, Patient Position: Sitting, BP Cuff Size: Large adult)   Pulse 80   Wt 82.1 kg (181 lb)   LMP 09/30/2023   SpO2 98%   BMI 30.12 kg/m²     Physical Exam  Constitutional:       Appearance: Normal appearance.   HENT:      Head: Normocephalic and atraumatic.      Right Ear: Tympanic membrane and ear canal normal.      Left Ear: Tympanic membrane and ear canal normal.      Mouth/Throat:      Mouth: Mucous membranes are moist.      Pharynx: Oropharynx is clear.   Eyes:      Extraocular Movements: Extraocular movements intact.      Conjunctiva/sclera: Conjunctivae normal.      Pupils: Pupils are equal, round, and reactive to light.   Cardiovascular:      Rate and Rhythm: Normal rate and regular rhythm.      Pulses: Normal pulses.      Heart sounds: Normal heart sounds.   Pulmonary:      Effort: Pulmonary effort is normal.      Breath sounds: Normal breath sounds.   Abdominal:      General: Abdomen is flat. Bowel sounds are normal.      Palpations: Abdomen is soft.   Musculoskeletal:         General: Normal range of motion.      Cervical back: Normal range of motion and neck supple.   Skin:     General: Skin is warm and dry.      Capillary Refill: Capillary refill takes 2 to 3 seconds.   Neurological:      General: No focal deficit present.      Mental Status: She is alert and oriented to person, place, and time. Mental status is at baseline.   Psychiatric:         Mood and Affect: Mood normal.         Behavior: Behavior normal.         Thought Content: Thought content normal.         Judgment: Judgment  normal.         Assessment/Plan   1. Hypothyroidism, unspecified type    - levothyroxine (Synthroid, Levoxyl) 137 mcg tablet; TAKE 1 TABLET BY MOUTH daily  Dispense: 90 tablet; Refill: 3    2. Dental abscess (Primary)    - amoxicillin-pot clavulanate (Augmentin) 875-125 mg tablet; Take 1 tablet (875 mg) by mouth 2 times a day for 10 days.  Dispense: 20 tablet; Refill: 0  d

## 2024-11-20 ENCOUNTER — OFFICE VISIT (OUTPATIENT)
Dept: CARDIOLOGY | Facility: CLINIC | Age: 74
End: 2024-11-20
Payer: MEDICARE

## 2024-11-20 VITALS
RESPIRATION RATE: 16 BRPM | OXYGEN SATURATION: 91 % | BODY MASS INDEX: 30.79 KG/M2 | SYSTOLIC BLOOD PRESSURE: 120 MMHG | WEIGHT: 185 LBS | DIASTOLIC BLOOD PRESSURE: 62 MMHG | HEART RATE: 71 BPM

## 2024-11-20 DIAGNOSIS — R06.09 DYSPNEA ON EXERTION: ICD-10-CM

## 2024-11-20 DIAGNOSIS — E78.5 HYPERLIPIDEMIA, UNSPECIFIED HYPERLIPIDEMIA TYPE: ICD-10-CM

## 2024-11-20 DIAGNOSIS — I25.10 CORONARY ARTERY DISEASE INVOLVING NATIVE CORONARY ARTERY OF NATIVE HEART WITHOUT ANGINA PECTORIS: ICD-10-CM

## 2024-11-20 DIAGNOSIS — R07.89 CHEST TIGHTNESS: ICD-10-CM

## 2024-11-20 DIAGNOSIS — I10 ESSENTIAL HYPERTENSION: ICD-10-CM

## 2024-11-20 DIAGNOSIS — Z95.5 PRESENCE OF STENT IN CORONARY ARTERY: Primary | ICD-10-CM

## 2024-11-20 PROCEDURE — 99213 OFFICE O/P EST LOW 20 MIN: CPT | Performed by: INTERNAL MEDICINE

## 2024-11-20 PROCEDURE — 3074F SYST BP LT 130 MM HG: CPT | Performed by: INTERNAL MEDICINE

## 2024-11-20 PROCEDURE — 1123F ACP DISCUSS/DSCN MKR DOCD: CPT | Performed by: INTERNAL MEDICINE

## 2024-11-20 PROCEDURE — 3078F DIAST BP <80 MM HG: CPT | Performed by: INTERNAL MEDICINE

## 2024-11-20 NOTE — PROGRESS NOTES
Subjective   Galina Chao is a 74 y.o. female.    Chief Complaint:  Follow-up coronary artery disease hypertension and hyperlipidemia.    HPI    Since her last visit she underwent cervical spine surgery.  She has had a good result feels much better.  Has not had any anginal symptoms.  Has been losing weight.  No other cardiac issues or cardiac symptoms.  Complains of a growth in her lower jaw.    Cardiac catheterization in July 2019 demonstrated mild coronary artery disease with a patent stent in the right coronary artery. There were 30% stenoses in her vessels.     In 2016 she was found to have a positive calcium score of 1011. Risk factors for coronary artery disease include hypertension and heavy smoking history although she did quit at 41. She has a positive family history of heart disease in that her brother had a myocardial infarction.       In 2016 she underwent cardiac catheterization angioplasty and stent placement of a right coronary artery stenosis. The anterior descending had mild disease. The right coronary artery had a 99% stenosis. Renal arteries were normal.     Other medical problems include history of degenerative arthritis and hypertension. There is a history of hypothyroidism. She's had a cholecystectomy. She's had bilateral knee replacements.     She has had a severe reaction to spironolactone.      Allergies  Medication    · Cipro   Recorded By: Iris Osoroi; 4/24/2014 4:38:26 PM   · Indocin   Recorded By: Iris Osorio; 4/24/2014 4:38:26 PM   · sulfa   Recorded By: Iris Osorio; 4/24/2014 4:38:26 PM     Family History  Mother    · Family history of hypertension (V17.49) (Z82.49)   · Family history of malignant neoplasm (V16.9) (Z80.9)  Father    · Family history of hypertension (V17.49) (Z82.49)   · Family history of leukemia (V16.6) (Z80.6)   · Family history of malignant neoplasm (V16.9) (Z80.9)     Social History  Problems    · Daily caffeinated coffee consumption   · Former smoker  (V15.82) (Z87.891)   · quit smoking 23 years ago   · Lives with family   ·    · No alcohol use     Review of Systems   Constitutional: Positive for malaise/fatigue.   Cardiovascular:  Positive for dyspnea on exertion.   Musculoskeletal:  Positive for arthritis and joint pain.   All other systems reviewed and are negative.    Current Outpatient Medications   Medication Sig Dispense Refill    albuterol 90 mcg/actuation inhaler Inhale 2 puffs every 4 hours if needed for shortness of breath.      amLODIPine (Norvasc) 5 mg tablet Take 1 tablet (5 mg) by mouth once daily. 90 tablet 3    amoxicillin-pot clavulanate (Augmentin) 875-125 mg tablet Take 1 tablet (875 mg) by mouth 2 times a day for 10 days. 20 tablet 0    aspirin 81 mg EC tablet Take 1 tablet (81 mg) by mouth once daily.      atenolol (Tenormin) 25 mg tablet Take 1 tablet (25 mg) by mouth once daily. 90 tablet 3    atorvastatin (Lipitor) 20 mg tablet TAKE 1 TABLET BY MOUTH DAILY 90 tablet 3    biotin 10 mg tablet Take 2 tablets (20 mg) by mouth once daily.      calcium carbonate (CALCIUM 600 ORAL) 2 times a day.      cholecalciferol (Vitamin D-3) 50 mcg (2,000 unit) capsule Take 1 capsule (50 mcg) by mouth early in the morning..      eplerenone (Inspra) 25 mg tablet Take 1 tablet (25 mg) by mouth once daily. 90 tablet 3    gabapentin (Neurontin) 300 mg capsule Take 1 capsule (300 mg) by mouth 2 times a day. 180 capsule 3    HYDROcodone-acetaminophen (Norco) 5-325 mg tablet Take 1 tablet by mouth 3 times a day as needed for severe pain (7 - 10). 20 tablet 0    hydroxychloroquine (Plaquenil) 200 mg tablet Take 2 tablets (400 mg) by mouth once daily.      Incruse Ellipta 62.5 mcg/actuation inhalation Inhale 1 puff (62.5 mcg) once daily. 90 capsule 3    lansoprazole (Prevacid) 30 mg DR capsule TAKE 1 CAPSULE BY MOUTH EVERY  MORNING 90 capsule 3    levothyroxine (Synthroid, Levoxyl) 137 mcg tablet TAKE 1 TABLET BY MOUTH daily 90 tablet 3    minoxidil  (Loniten) 2.5 mg tablet Take 0.5 tablets (1.25 mg) by mouth once daily. 45 tablet 3    multivitamin-iron-minerals-folic acid (Multivitamin 50 Plus) tablet Take 1 tablet by mouth once daily.      nortriptyline (Pamelor) 25 mg capsule Take 2 capsules (50 mg) by mouth once daily. 180 capsule 3    zolpidem (Ambien) 10 mg tablet Take 1 tablet (10 mg) by mouth once daily at bedtime. 90 tablet 1    dexAMETHasone 0.5 mg/5 mL elixir Take every 6 hours      diazePAM (Valium) 5 mg tablet Take one tablet ONE HOUR BEFORE THE MRI. Then take another tablet on arrival to MRI facility 2 tablet 0    zinc gluconate 50 mg tablet Take 1 tablet (50 mg of elemental zinc) by mouth once daily.       No current facility-administered medications for this visit.        Visit Vitals  /62   Pulse 71   Resp 16   Wt 83.9 kg (185 lb)   LMP 09/30/2023   SpO2 91%   BMI 30.79 kg/m²   OB Status Postmenopausal   Smoking Status Former   BSA 1.96 m²        Objective     Constitutional:       Appearance: Not in distress.   Neck:      Vascular: JVD normal.   Pulmonary:      Breath sounds: Normal breath sounds.   Cardiovascular:      Normal rate. Regular rhythm. Normal S1. Normal S2.       Murmurs: There is no murmur.      No gallop.    Pulses:     Intact distal pulses.   Edema:     Peripheral edema absent.   Abdominal:      General: There is no distension.      Palpations: Abdomen is soft.   Neurological:      Mental Status: Alert.         Lab Review:   Lab Results   Component Value Date     06/10/2024    K 4.7 06/10/2024     06/10/2024    CO2 26 06/10/2024    BUN 18 06/10/2024    CREATININE 0.67 06/10/2024    GLUCOSE 81 06/10/2024    CALCIUM 9.9 06/10/2024     Lab Results   Component Value Date    CHOL 138 05/12/2022    TRIG 154 (H) 05/12/2022    HDL 51.3 05/12/2022       Assessment:    1.  Coronary disease.  Extensive coronary artery disease on the basis of coronary CT calcium scoring.  Mild disease by cath.  No anginal symptoms.    2.   Hypertension.  Blood pressures are excellent.    3.  Hyperlipidemia.  Past labs have demonstrated a low LDL.  No recent labs available.

## 2024-12-02 ENCOUNTER — APPOINTMENT (OUTPATIENT)
Dept: NEUROSURGERY | Facility: CLINIC | Age: 74
End: 2024-12-02
Payer: MEDICARE

## 2024-12-12 DIAGNOSIS — M54.50 CHRONIC BILATERAL LOW BACK PAIN WITHOUT SCIATICA: ICD-10-CM

## 2024-12-12 DIAGNOSIS — H81.10 BENIGN PAROXYSMAL POSITIONAL VERTIGO, UNSPECIFIED LATERALITY: ICD-10-CM

## 2024-12-12 DIAGNOSIS — I10 ESSENTIAL HYPERTENSION: ICD-10-CM

## 2024-12-12 DIAGNOSIS — G89.29 CHRONIC BILATERAL LOW BACK PAIN WITHOUT SCIATICA: ICD-10-CM

## 2024-12-12 RX ORDER — MINOXIDIL 2.5 MG/1
2.5 TABLET ORAL DAILY
Qty: 90 TABLET | Refills: 3 | Status: SHIPPED | OUTPATIENT
Start: 2024-12-12 | End: 2025-12-12

## 2024-12-12 RX ORDER — EPLERENONE 25 MG/1
25 TABLET, FILM COATED ORAL DAILY
Qty: 90 TABLET | Refills: 3 | Status: SHIPPED | OUTPATIENT
Start: 2024-12-12

## 2024-12-12 RX ORDER — HYDROCODONE BITARTRATE AND ACETAMINOPHEN 5; 325 MG/1; MG/1
1 TABLET ORAL 3 TIMES DAILY PRN
Qty: 30 TABLET | Refills: 0 | Status: SHIPPED | OUTPATIENT
Start: 2024-12-12

## 2024-12-29 DIAGNOSIS — I10 ESSENTIAL HYPERTENSION: ICD-10-CM

## 2024-12-30 RX ORDER — AMLODIPINE BESYLATE 5 MG/1
5 TABLET ORAL DAILY
Qty: 90 TABLET | Refills: 3 | Status: SHIPPED | OUTPATIENT
Start: 2024-12-30

## 2025-01-09 DIAGNOSIS — I10 ESSENTIAL HYPERTENSION: ICD-10-CM

## 2025-01-10 RX ORDER — AMLODIPINE BESYLATE 5 MG/1
5 TABLET ORAL DAILY
Qty: 90 TABLET | Refills: 3 | Status: SHIPPED | OUTPATIENT
Start: 2025-01-10

## 2025-02-12 DIAGNOSIS — G89.29 CHRONIC BILATERAL LOW BACK PAIN WITHOUT SCIATICA: ICD-10-CM

## 2025-02-12 DIAGNOSIS — M54.50 CHRONIC BILATERAL LOW BACK PAIN WITHOUT SCIATICA: ICD-10-CM

## 2025-02-13 RX ORDER — HYDROCODONE BITARTRATE AND ACETAMINOPHEN 5; 325 MG/1; MG/1
1 TABLET ORAL 3 TIMES DAILY PRN
Qty: 30 TABLET | Refills: 0 | Status: SHIPPED | OUTPATIENT
Start: 2025-02-13

## 2025-02-19 ENCOUNTER — TELEPHONE (OUTPATIENT)
Facility: CLINIC | Age: 75
End: 2025-02-19
Payer: MEDICARE

## 2025-02-19 DIAGNOSIS — N94.9 VAGINAL SYMPTOM: Primary | ICD-10-CM

## 2025-02-19 NOTE — TELEPHONE ENCOUNTER
"The patient called, requesting a referral to Urogynecology.  She wants to consider a \"sacrocolpopexy and hysterectomy\".  I reviewed my notes.  There is no specific mention of pelvic organ prolapse or urinary symptoms.  I did place a Urogynecology referral, but whether she is a surgical candidate will be determined by their evaluation.  "

## 2025-02-19 NOTE — TELEPHONE ENCOUNTER
Patient needs a referral to urogynecology for sacrocolpopexy and hysterectomy. She is down in [place] right now but wants to make an appointment for when she comes home.

## 2025-03-05 DIAGNOSIS — K21.9 GASTROESOPHAGEAL REFLUX DISEASE WITHOUT ESOPHAGITIS: ICD-10-CM

## 2025-03-05 RX ORDER — LANSOPRAZOLE 30 MG/1
30 CAPSULE, DELAYED RELEASE ORAL DAILY
Qty: 90 CAPSULE | Refills: 3 | Status: SHIPPED | OUTPATIENT
Start: 2025-03-05

## 2025-03-26 DIAGNOSIS — F32.A DEPRESSION, UNSPECIFIED DEPRESSION TYPE: ICD-10-CM

## 2025-03-26 DIAGNOSIS — I10 HYPERTENSION, UNSPECIFIED TYPE: ICD-10-CM

## 2025-03-27 RX ORDER — ATENOLOL 25 MG/1
25 TABLET ORAL DAILY
Qty: 90 TABLET | Refills: 3 | Status: SHIPPED | OUTPATIENT
Start: 2025-03-27

## 2025-03-27 RX ORDER — NORTRIPTYLINE HYDROCHLORIDE 25 MG/1
50 CAPSULE ORAL DAILY
Qty: 180 CAPSULE | Refills: 3 | Status: SHIPPED | OUTPATIENT
Start: 2025-03-27 | End: 2026-03-27

## 2025-03-27 RX ORDER — NORTRIPTYLINE HYDROCHLORIDE 25 MG/1
50 CAPSULE ORAL DAILY
Qty: 180 CAPSULE | Refills: 3 | Status: SHIPPED | OUTPATIENT
Start: 2025-03-27

## 2025-05-13 PROBLEM — E66.811 CLASS 1 OBESITY WITH BODY MASS INDEX (BMI) OF 30.0 TO 30.9 IN ADULT: Status: ACTIVE | Noted: 2024-05-16

## 2025-05-14 DIAGNOSIS — M54.12 RIGHT CERVICAL RADICULOPATHY: ICD-10-CM

## 2025-05-14 DIAGNOSIS — E78.5 HYPERLIPIDEMIA, UNSPECIFIED HYPERLIPIDEMIA TYPE: ICD-10-CM

## 2025-05-15 LAB
NON-UH HIE C-REACTIVE PROTEIN, QUANTITATIVE: <0.5 MG/DL (ref 0–0.5)
NON-UH HIE SED RATE WESTERGREN: 8 MM/HR (ref 0–30)

## 2025-05-15 RX ORDER — GABAPENTIN 300 MG/1
300 CAPSULE ORAL 2 TIMES DAILY
Qty: 180 CAPSULE | Refills: 3 | Status: SHIPPED | OUTPATIENT
Start: 2025-05-15

## 2025-05-18 RX ORDER — ATORVASTATIN CALCIUM 20 MG/1
20 TABLET, FILM COATED ORAL DAILY
Qty: 90 TABLET | Refills: 3 | Status: SHIPPED | OUTPATIENT
Start: 2025-05-18

## 2025-05-23 ENCOUNTER — PREP FOR PROCEDURE (OUTPATIENT)
Dept: UROLOGY | Facility: CLINIC | Age: 75
End: 2025-05-23
Payer: MEDICARE

## 2025-05-23 DIAGNOSIS — N39.3 SUI (STRESS URINARY INCONTINENCE, FEMALE): ICD-10-CM

## 2025-05-23 DIAGNOSIS — N81.2 UTEROVAGINAL PROLAPSE, INCOMPLETE: Primary | ICD-10-CM

## 2025-05-23 RX ORDER — CEFAZOLIN SODIUM 2 G/100ML
2 INJECTION, SOLUTION INTRAVENOUS ONCE
OUTPATIENT
Start: 2025-05-23 | End: 2025-05-23

## 2025-05-23 NOTE — H&P
History Of Present Illness  Galina Chao is a 75 y.o. female presenting with UVP, RECTOCELE AND LEATHA. Pt was seen today at my [facility] office.     Extract from my note  75-year-old female patient with history of pelvic organ prolapse consistent with anterior and posterior wall prolapse and mild uterine descensus. Patient had evidence of stress urinary incontinence on urodynamic testing. Patient was counseled about the options for management. She would like to proceed with hysterectomy with uterosacral suspension and anterior posterior repair and mid urethral sling. She understands risk and benefit of procedure. Due to the fact that the stress might be difficult to access vaginally we counseled her about the robotic TLH and the uterosacral suspension. She is in agreement to proceed with that.        Past Medical History  She has a past medical history of Allergic, Arthritis, COPD (chronic obstructive pulmonary disease) (Multi), Disease of thyroid gland, Diverticulosis of intestine, part unspecified, without perforation or abscess without bleeding, Emphysema of lung (Multi), GERD (gastroesophageal reflux disease), Heart murmur, Hypertension, Inflammatory bowel disease, Obesity, unspecified, Personal history of other diseases of the circulatory system, Personal history of other diseases of the circulatory system (10/09/2020), Personal history of other diseases of the circulatory system, Personal history of other diseases of the digestive system, Personal history of other diseases of the digestive system, Personal history of other diseases of the musculoskeletal system and connective tissue, Personal history of other diseases of the musculoskeletal system and connective tissue, Personal history of other diseases of the musculoskeletal system and connective tissue, Personal history of other diseases of the respiratory system, Personal history of other diseases of the respiratory system, and Personal history of other  endocrine, nutritional and metabolic disease.    Surgical History  She has a past surgical history that includes Cholecystectomy (05/24/2016); Other surgical history (06/15/2022); Other surgical history (06/15/2022); Other surgical history (04/27/2018); Other surgical history (04/27/2018); Appendectomy (04/27/2018); Cardiac surgery (10/12/2017); Back surgery (10/12/2017); Cardiac catheterization; Endometrial ablation; Joint replacement; and Breast biopsy (Left).     Social History  She reports that she quit smoking about 29 years ago. Her smoking use included cigarettes. She started smoking about 62 years ago. She has a 66 pack-year smoking history. She has never used smokeless tobacco. She reports current alcohol use. She reports that she does not use drugs.    Family History  Family History[1]     Allergies  Ciprofloxacin, Indomethacin, Sulfa (sulfonamide antibiotics), and Nsaids (non-steroidal anti-inflammatory drug)         Physical Exam  Constitutional: Well-nourished. No physical deformities. Normally developed. Good grooming. Respiratory: No labored breathing, no use of accessory muscles. Cardiovascular: Normal temperature, normal extremity pulses, no swelling, no varicosities. Lymphatic: No enlargement of neck, axillae, groin. Skin: No paleness, no jaundice, no cyanosis. No lesion, no ulcer, no rash. Neurologic / Psychiatric: Oriented to time, oriented to place, oriented to person. No depression, no anxiety, no agitation. Gastrointestinal: No mass, no tenderness, no rigidity, non obese abdomen. Eyes: Normal conjunctivae. Normal eyelids. Musculoskeletal: Normal gait and station of head and neck.    aa -1 ba -1 c -6   gh 3.5 pb 3 tvl 9   ap1 bp1 d-7      Last Recorded Vitals  Last menstrual period 09/30/2023.    Relevant Results    No results found. However, due to the size of the patient record, not all encounters were searched. Please check Results Review for a complete set of results.  Imaging  No results  found.    Cardiology, Vascular, and Other Imaging  No other imaging results found for the past 7 days         Assessment/Plan       75-year-old female patient with history of pelvic organ prolapse consistent with anterior and posterior wall prolapse and mild uterine descensus. Patient had evidence of stress urinary incontinence on urodynamic testing. Patient was counseled about the options for management. She would like to proceed with hysterectomy with uterosacral suspension and anterior posterior repair and mid urethral sling. She understands risk and benefit of procedure. Due to the fact that the stress might be difficult to access vaginally we counseled her about the robotic TLH and the uterosacral suspension. She is in agreement to proceed with that.          Alfonzo Gore MD         [1]   Family History  Problem Relation Name Age of Onset    Breast cancer Mother JEFRY GRANT 55    Hypertension Mother JEFRY GRANT     Cancer Father ISA GRANT JR     Hypertension Father ISA GRANT JR     Leukemia Father ISA GRANT JR     Hypertension Sister      Hyperlipidemia Sister      Kidney disease Brother ISA GRANT JR     Stroke Brother ISA GRANT JR     Other (ischemic heart disease) Other

## 2025-06-04 ENCOUNTER — APPOINTMENT (OUTPATIENT)
Dept: CARDIOLOGY | Facility: CLINIC | Age: 75
End: 2025-06-04
Payer: MEDICARE

## 2025-06-04 VITALS
BODY MASS INDEX: 29.57 KG/M2 | HEIGHT: 66 IN | SYSTOLIC BLOOD PRESSURE: 108 MMHG | OXYGEN SATURATION: 94 % | WEIGHT: 184 LBS | HEART RATE: 86 BPM | DIASTOLIC BLOOD PRESSURE: 72 MMHG

## 2025-06-04 DIAGNOSIS — R06.09 DYSPNEA ON EXERTION: ICD-10-CM

## 2025-06-04 DIAGNOSIS — E78.5 HYPERLIPIDEMIA, UNSPECIFIED HYPERLIPIDEMIA TYPE: ICD-10-CM

## 2025-06-04 DIAGNOSIS — I10 ESSENTIAL HYPERTENSION: ICD-10-CM

## 2025-06-04 DIAGNOSIS — Z01.810 PREOPERATIVE CARDIOVASCULAR EXAMINATION: ICD-10-CM

## 2025-06-04 DIAGNOSIS — Z95.5 PRESENCE OF STENT IN CORONARY ARTERY: ICD-10-CM

## 2025-06-04 DIAGNOSIS — I25.10 CORONARY ARTERY DISEASE INVOLVING NATIVE CORONARY ARTERY OF NATIVE HEART WITHOUT ANGINA PECTORIS: Primary | ICD-10-CM

## 2025-06-04 PROCEDURE — 3074F SYST BP LT 130 MM HG: CPT | Performed by: INTERNAL MEDICINE

## 2025-06-04 PROCEDURE — 1159F MED LIST DOCD IN RCRD: CPT | Performed by: INTERNAL MEDICINE

## 2025-06-04 PROCEDURE — 3078F DIAST BP <80 MM HG: CPT | Performed by: INTERNAL MEDICINE

## 2025-06-04 PROCEDURE — 99213 OFFICE O/P EST LOW 20 MIN: CPT | Performed by: INTERNAL MEDICINE

## 2025-06-04 PROCEDURE — 1123F ACP DISCUSS/DSCN MKR DOCD: CPT | Performed by: INTERNAL MEDICINE

## 2025-06-04 PROCEDURE — 1036F TOBACCO NON-USER: CPT | Performed by: INTERNAL MEDICINE

## 2025-06-04 PROCEDURE — 99214 OFFICE O/P EST MOD 30 MIN: CPT | Performed by: INTERNAL MEDICINE

## 2025-06-04 PROCEDURE — 3008F BODY MASS INDEX DOCD: CPT | Performed by: INTERNAL MEDICINE

## 2025-06-04 RX ORDER — ESTRADIOL 0.1 MG/G
CREAM VAGINAL
COMMUNITY
Start: 2025-04-10

## 2025-06-04 NOTE — PATIENT INSTRUCTIONS
After the surgery, stop the amlodipine 5 mg daily.    We will give the ok for your surgery.    Talk to your pulmonary Doctor about getting a CT scan of your lungs.

## 2025-06-04 NOTE — LETTER
June 4, 2025     Alfonzo Gore MD  [address]  [facility] [address]    Patient: Galina Chao   YOB: 1950   Date of Visit: 6/4/2025       Dear Dr. Alfonzo Gore MD:    Thank you for referring Galina Chao to me for evaluation. Below are my notes for this consultation.  If you have questions, please do not hesitate to call me. I look forward to following your patient along with you.       Sincerely,     Kameron Cruz MD      CC: No Recipients  ______________________________________________________________________________________    Subjective  Galina Chao is a 75 y.o. female.    Chief Complaint:  Preoperative evaluation.  Follow-up coronary disease, hypertension, hyperlipidemia.    HPI    We last saw her in November 2024.  Over the past several months she has not had any anginal symptoms.  She continues on a weight loss program.  Has done very well and has maintained her weight loss.  Needs some pelvic surgery.  Otherwise is felt well.  Some arthritis issues.    Cardiac catheterization in July 2019 demonstrated mild coronary artery disease with a patent stent in the right coronary artery. There were 30% stenoses in her vessels.     In 2016 she was found to have a positive calcium score of 1011. Risk factors for coronary artery disease include hypertension and heavy smoking history although she did quit at 41. She has a positive family history of heart disease in that her brother had a myocardial infarction.       In 2016 she underwent cardiac catheterization angioplasty and stent placement of a right coronary artery stenosis. The anterior descending had mild disease. The right coronary artery had a 99% stenosis. Renal arteries were normal.     Other medical problems include history of degenerative arthritis and hypertension. There is a history of hypothyroidism. She's had a cholecystectomy. She's had bilateral knee replacements.     She has had a severe reaction to  "spironolactone.      Allergies  Medication    · Cipro   · Indocin   · sulfa      Family History  Mother    · Family history of hypertension (V17.49) (Z82.49)   Father    · Family history of hypertension (V17.49) (Z82.49)      Social History  Problems    · Former smoker (V15.82) (Z87.891)   · quit smoking many years ago   · Lives with family   ·    · No alcohol use     Review of Systems   Constitutional: Positive for malaise/fatigue.   Cardiovascular:  Positive for dyspnea on exertion.   Musculoskeletal:  Positive for arthritis and joint pain.   All other systems reviewed and are negative.    Current Medications[1]     Visit Vitals  /72 (BP Location: Left arm)   Pulse 86   Ht 1.676 m (5' 6\")   Wt 83.5 kg (184 lb)   LMP 09/30/2023   SpO2 94%   BMI 29.70 kg/m²   OB Status Postmenopausal   Smoking Status Former   BSA 1.97 m²        Objective    Constitutional:       Appearance: Not in distress.   Neck:      Vascular: JVD normal.   Pulmonary:      Breath sounds: Normal breath sounds.   Cardiovascular:      Normal rate. Regular rhythm. Normal S1. Normal S2.       Murmurs: There is no murmur.      No gallop.    Pulses:     Intact distal pulses.   Edema:     Peripheral edema absent.   Abdominal:      General: There is no distension.      Palpations: Abdomen is soft.   Neurological:      Mental Status: Alert.         Lab Review:   Lab Results   Component Value Date     06/10/2024    K 4.7 06/10/2024     06/10/2024    CO2 26 06/10/2024    BUN 18 06/10/2024    CREATININE 0.67 06/10/2024    GLUCOSE 81 06/10/2024    CALCIUM 9.9 06/10/2024     Lab Results   Component Value Date    WBC 6.2 06/10/2024    HGB 12.9 06/10/2024    HCT 40.4 06/10/2024    MCV 96 06/10/2024     06/10/2024     Lab Results   Component Value Date    CHOL 138 05/12/2022    TRIG 154 (H) 05/12/2022    HDL 51.3 05/12/2022       Assessment:    1.  Coronary artery disease.  No anginal symptoms.  Doing well on current medical " management.  History of angioplasty and stenting of the right coronary artery.    2.  Hypertension.  Blood pressures are low.  Will have her stop amlodipine after her operative procedure.    3.  Hyperlipidemia.  Cholesterol 138, HDL 51, LDL 56.    4.  Preoperative evaluation.  Latest imaging study showed no ischemia with a left ventricular ejection fraction of 65%.  She is therefore cleared for the operative procedure with an acceptable cardiac risk.  Can stop aspirin 5 days prior to the procedure.    5.  History of tobacco abuse.  She sees a pulmonologist.  We have suggested that she get a screening CT of the lungs.           [1]  Current Outpatient Medications   Medication Sig Dispense Refill   • albuterol 90 mcg/actuation inhaler Inhale 2 puffs every 4 hours if needed for shortness of breath.     • amLODIPine (Norvasc) 5 mg tablet Take 1 tablet (5 mg) by mouth once daily. 90 tablet 3   • aspirin 81 mg EC tablet Take 1 tablet (81 mg) by mouth once daily.     • atenolol (Tenormin) 25 mg tablet TAKE 1 TABLET BY MOUTH ONCE  DAILY 90 tablet 3   • atorvastatin (Lipitor) 20 mg tablet TAKE 1 TABLET BY MOUTH DAILY 90 tablet 3   • biotin 10 mg tablet Take 2 tablets (20 mg) by mouth once daily.     • calcium carbonate (CALCIUM 600 ORAL) 2 times a day.     • cholecalciferol (Vitamin D-3) 50 mcg (2,000 unit) capsule Take 1 capsule (50 mcg) by mouth early in the morning..     • dexAMETHasone 0.5 mg/5 mL elixir Take every 6 hours     • diazePAM (Valium) 5 mg tablet Take one tablet ONE HOUR BEFORE THE MRI. Then take another tablet on arrival to MRI facility 2 tablet 0   • eplerenone (Inspra) 25 mg tablet Take 1 tablet (25 mg) by mouth once daily. 90 tablet 3   • estradiol (Estrace) 0.01 % (0.1 mg/gram) vaginal cream      • gabapentin (Neurontin) 300 mg capsule TAKE 1 CAPSULE BY MOUTH TWICE  DAILY 180 capsule 3   • HYDROcodone-acetaminophen (Norco) 5-325 mg tablet Take 1 tablet by mouth 3 times a day as needed for severe pain (7  - 10). 30 tablet 0   • hydroxychloroquine (Plaquenil) 200 mg tablet Take 2 tablets (400 mg) by mouth once daily.     • Incruse Ellipta 62.5 mcg/actuation inhalation Inhale 1 puff (62.5 mcg) once daily. 90 capsule 3   • lansoprazole (Prevacid) 30 mg DR capsule TAKE 1 CAPSULE BY MOUTH IN THE  MORNING 90 capsule 3   • levothyroxine (Synthroid, Levoxyl) 137 mcg tablet TAKE 1 TABLET BY MOUTH daily 90 tablet 3   • minoxidil (Loniten) 2.5 mg tablet Take 1 tablet (2.5 mg) by mouth once daily. 90 tablet 3   • multivitamin-iron-minerals-folic acid (Multivitamin 50 Plus) tablet Take 1 tablet by mouth once daily.     • nortriptyline (Pamelor) 25 mg capsule Take 2 capsules (50 mg) by mouth once daily. 180 capsule 3   • nortriptyline (Pamelor) 25 mg capsule TAKE 2 CAPSULES BY MOUTH ONCE  DAILY 180 capsule 3   • zinc gluconate 50 mg tablet Take 1 tablet by mouth once daily.     • zolpidem (Ambien) 10 mg tablet Take 1 tablet (10 mg) by mouth once daily at bedtime. 90 tablet 1     No current facility-administered medications for this visit.

## 2025-06-04 NOTE — PROGRESS NOTES
Subjective   Galina Chao is a 75 y.o. female.    Chief Complaint:  Preoperative evaluation.  Follow-up coronary disease, hypertension, hyperlipidemia.    HPI    We last saw her in November 2024.  Over the past several months she has not had any anginal symptoms.  She continues on a weight loss program.  Has done very well and has maintained her weight loss.  Needs some pelvic surgery.  Otherwise is felt well.  Some arthritis issues.    Cardiac catheterization in July 2019 demonstrated mild coronary artery disease with a patent stent in the right coronary artery. There were 30% stenoses in her vessels.     In 2016 she was found to have a positive calcium score of 1011. Risk factors for coronary artery disease include hypertension and heavy smoking history although she did quit at 41. She has a positive family history of heart disease in that her brother had a myocardial infarction.       In 2016 she underwent cardiac catheterization angioplasty and stent placement of a right coronary artery stenosis. The anterior descending had mild disease. The right coronary artery had a 99% stenosis. Renal arteries were normal.     Other medical problems include history of degenerative arthritis and hypertension. There is a history of hypothyroidism. She's had a cholecystectomy. She's had bilateral knee replacements.     She has had a severe reaction to spironolactone.      Allergies  Medication    · Cipro   · Indocin   · sulfa      Family History  Mother    · Family history of hypertension (V17.49) (Z82.49)   Father    · Family history of hypertension (V17.49) (Z82.49)      Social History  Problems    · Former smoker (V15.82) (Z87.891)   · quit smoking many years ago   · Lives with family   ·    · No alcohol use     Review of Systems   Constitutional: Positive for malaise/fatigue.   Cardiovascular:  Positive for dyspnea on exertion.   Musculoskeletal:  Positive for arthritis and joint pain.   All other systems reviewed  "and are negative.    Current Medications[1]     Visit Vitals  /72 (BP Location: Left arm)   Pulse 86   Ht 1.676 m (5' 6\")   Wt 83.5 kg (184 lb)   LMP 09/30/2023   SpO2 94%   BMI 29.70 kg/m²   OB Status Postmenopausal   Smoking Status Former   BSA 1.97 m²        Objective     Constitutional:       Appearance: Not in distress.   Neck:      Vascular: JVD normal.   Pulmonary:      Breath sounds: Normal breath sounds.   Cardiovascular:      Normal rate. Regular rhythm. Normal S1. Normal S2.       Murmurs: There is no murmur.      No gallop.    Pulses:     Intact distal pulses.   Edema:     Peripheral edema absent.   Abdominal:      General: There is no distension.      Palpations: Abdomen is soft.   Neurological:      Mental Status: Alert.         Lab Review:   Lab Results   Component Value Date     06/10/2024    K 4.7 06/10/2024     06/10/2024    CO2 26 06/10/2024    BUN 18 06/10/2024    CREATININE 0.67 06/10/2024    GLUCOSE 81 06/10/2024    CALCIUM 9.9 06/10/2024     Lab Results   Component Value Date    WBC 6.2 06/10/2024    HGB 12.9 06/10/2024    HCT 40.4 06/10/2024    MCV 96 06/10/2024     06/10/2024     Lab Results   Component Value Date    CHOL 138 05/12/2022    TRIG 154 (H) 05/12/2022    HDL 51.3 05/12/2022       Assessment:    1.  Coronary artery disease.  No anginal symptoms.  Doing well on current medical management.  History of angioplasty and stenting of the right coronary artery.    2.  Hypertension.  Blood pressures are low.  Will have her stop amlodipine after her operative procedure.    3.  Hyperlipidemia.  Cholesterol 138, HDL 51, LDL 56.    4.  Preoperative evaluation.  Latest imaging study showed no ischemia with a left ventricular ejection fraction of 65%.  She is therefore cleared for the operative procedure with an acceptable cardiac risk.  Can stop aspirin 5 days prior to the procedure.    5.  History of tobacco abuse.  She sees a pulmonologist.  We have suggested that she " get a screening CT of the lungs.         [1]   Current Outpatient Medications   Medication Sig Dispense Refill    albuterol 90 mcg/actuation inhaler Inhale 2 puffs every 4 hours if needed for shortness of breath.      amLODIPine (Norvasc) 5 mg tablet Take 1 tablet (5 mg) by mouth once daily. 90 tablet 3    aspirin 81 mg EC tablet Take 1 tablet (81 mg) by mouth once daily.      atenolol (Tenormin) 25 mg tablet TAKE 1 TABLET BY MOUTH ONCE  DAILY 90 tablet 3    atorvastatin (Lipitor) 20 mg tablet TAKE 1 TABLET BY MOUTH DAILY 90 tablet 3    biotin 10 mg tablet Take 2 tablets (20 mg) by mouth once daily.      calcium carbonate (CALCIUM 600 ORAL) 2 times a day.      cholecalciferol (Vitamin D-3) 50 mcg (2,000 unit) capsule Take 1 capsule (50 mcg) by mouth early in the morning..      dexAMETHasone 0.5 mg/5 mL elixir Take every 6 hours      diazePAM (Valium) 5 mg tablet Take one tablet ONE HOUR BEFORE THE MRI. Then take another tablet on arrival to MRI facility 2 tablet 0    eplerenone (Inspra) 25 mg tablet Take 1 tablet (25 mg) by mouth once daily. 90 tablet 3    estradiol (Estrace) 0.01 % (0.1 mg/gram) vaginal cream       gabapentin (Neurontin) 300 mg capsule TAKE 1 CAPSULE BY MOUTH TWICE  DAILY 180 capsule 3    HYDROcodone-acetaminophen (Norco) 5-325 mg tablet Take 1 tablet by mouth 3 times a day as needed for severe pain (7 - 10). 30 tablet 0    hydroxychloroquine (Plaquenil) 200 mg tablet Take 2 tablets (400 mg) by mouth once daily.      Incruse Ellipta 62.5 mcg/actuation inhalation Inhale 1 puff (62.5 mcg) once daily. 90 capsule 3    lansoprazole (Prevacid) 30 mg DR capsule TAKE 1 CAPSULE BY MOUTH IN THE  MORNING 90 capsule 3    levothyroxine (Synthroid, Levoxyl) 137 mcg tablet TAKE 1 TABLET BY MOUTH daily 90 tablet 3    minoxidil (Loniten) 2.5 mg tablet Take 1 tablet (2.5 mg) by mouth once daily. 90 tablet 3    multivitamin-iron-minerals-folic acid (Multivitamin 50 Plus) tablet Take 1 tablet by mouth once daily.       nortriptyline (Pamelor) 25 mg capsule Take 2 capsules (50 mg) by mouth once daily. 180 capsule 3    nortriptyline (Pamelor) 25 mg capsule TAKE 2 CAPSULES BY MOUTH ONCE  DAILY 180 capsule 3    zinc gluconate 50 mg tablet Take 1 tablet by mouth once daily.      zolpidem (Ambien) 10 mg tablet Take 1 tablet (10 mg) by mouth once daily at bedtime. 90 tablet 1     No current facility-administered medications for this visit.

## 2025-06-09 ENCOUNTER — APPOINTMENT (OUTPATIENT)
Dept: PRIMARY CARE | Facility: CLINIC | Age: 75
End: 2025-06-09
Payer: MEDICARE

## 2025-06-09 VITALS
OXYGEN SATURATION: 97 % | SYSTOLIC BLOOD PRESSURE: 120 MMHG | WEIGHT: 183 LBS | HEIGHT: 66 IN | BODY MASS INDEX: 29.41 KG/M2 | HEART RATE: 67 BPM | DIASTOLIC BLOOD PRESSURE: 76 MMHG

## 2025-06-09 DIAGNOSIS — J44.9 MODERATE COPD (CHRONIC OBSTRUCTIVE PULMONARY DISEASE) (MULTI): ICD-10-CM

## 2025-06-09 DIAGNOSIS — F51.01 PRIMARY INSOMNIA: ICD-10-CM

## 2025-06-09 DIAGNOSIS — Z79.899 CONTROLLED SUBSTANCE AGREEMENT SIGNED: ICD-10-CM

## 2025-06-09 DIAGNOSIS — G89.29 CHRONIC BILATERAL LOW BACK PAIN WITHOUT SCIATICA: ICD-10-CM

## 2025-06-09 DIAGNOSIS — I10 ESSENTIAL HYPERTENSION: ICD-10-CM

## 2025-06-09 DIAGNOSIS — Z71.89 ACP (ADVANCE CARE PLANNING): ICD-10-CM

## 2025-06-09 DIAGNOSIS — Z13.31 DEPRESSION SCREEN: ICD-10-CM

## 2025-06-09 DIAGNOSIS — Z00.00 ROUTINE GENERAL MEDICAL EXAMINATION AT HEALTH CARE FACILITY: Primary | ICD-10-CM

## 2025-06-09 DIAGNOSIS — M54.50 CHRONIC BILATERAL LOW BACK PAIN WITHOUT SCIATICA: ICD-10-CM

## 2025-06-09 PROCEDURE — 3008F BODY MASS INDEX DOCD: CPT | Performed by: STUDENT IN AN ORGANIZED HEALTH CARE EDUCATION/TRAINING PROGRAM

## 2025-06-09 PROCEDURE — 1159F MED LIST DOCD IN RCRD: CPT | Performed by: STUDENT IN AN ORGANIZED HEALTH CARE EDUCATION/TRAINING PROGRAM

## 2025-06-09 PROCEDURE — 3074F SYST BP LT 130 MM HG: CPT | Performed by: STUDENT IN AN ORGANIZED HEALTH CARE EDUCATION/TRAINING PROGRAM

## 2025-06-09 PROCEDURE — 1123F ACP DISCUSS/DSCN MKR DOCD: CPT | Performed by: STUDENT IN AN ORGANIZED HEALTH CARE EDUCATION/TRAINING PROGRAM

## 2025-06-09 PROCEDURE — G0444 DEPRESSION SCREEN ANNUAL: HCPCS | Performed by: STUDENT IN AN ORGANIZED HEALTH CARE EDUCATION/TRAINING PROGRAM

## 2025-06-09 PROCEDURE — 1036F TOBACCO NON-USER: CPT | Performed by: STUDENT IN AN ORGANIZED HEALTH CARE EDUCATION/TRAINING PROGRAM

## 2025-06-09 PROCEDURE — 3078F DIAST BP <80 MM HG: CPT | Performed by: STUDENT IN AN ORGANIZED HEALTH CARE EDUCATION/TRAINING PROGRAM

## 2025-06-09 PROCEDURE — 99397 PER PM REEVAL EST PAT 65+ YR: CPT | Performed by: STUDENT IN AN ORGANIZED HEALTH CARE EDUCATION/TRAINING PROGRAM

## 2025-06-09 PROCEDURE — G0439 PPPS, SUBSEQ VISIT: HCPCS | Performed by: STUDENT IN AN ORGANIZED HEALTH CARE EDUCATION/TRAINING PROGRAM

## 2025-06-09 RX ORDER — ZOLPIDEM TARTRATE 10 MG/1
10 TABLET ORAL NIGHTLY
Qty: 90 TABLET | Refills: 1 | Status: SHIPPED | OUTPATIENT
Start: 2025-06-09

## 2025-06-09 RX ORDER — METHOCARBAMOL 500 MG/1
500 TABLET, FILM COATED ORAL AS NEEDED
COMMUNITY

## 2025-06-09 RX ORDER — HYDROCODONE BITARTRATE AND ACETAMINOPHEN 5; 325 MG/1; MG/1
1 TABLET ORAL 3 TIMES DAILY PRN
Qty: 30 TABLET | Refills: 0 | Status: SHIPPED | OUTPATIENT
Start: 2025-06-09

## 2025-06-09 ASSESSMENT — ENCOUNTER SYMPTOMS: DEPRESSION: 0

## 2025-06-09 NOTE — PROGRESS NOTES
Subjective   Patient ID: Galina Chao is a 75 y.o. female who presents for Medicare Annual Wellness Visit Subsequent (Not fasting ).    HPI       Primary insomnia Long history of this, on Ambien doing well medical floor every 6 months.     Patient has no chronic back issues.  She does take some  patient has 1-2 refills a year just takes only as needed.  From her old doctor.  She has pain management as well for this.     COPD does follow with pulmonology doing well very mild case.    OARRS:  No data recorded  I have personally reviewed the OARRS report for Galina Chao. I have considered the risks of abuse, dependence, addiction and diversion    Is the patient prescribed a combination of a benzodiazepine and opioid?  NO    Last Urine Drug Screen / ordered today: Yes  Recent Results (from the past 8760 hours)   Confirmation Opiate/Opioid/Benzo Prescription Compliance    Collection Time: 06/10/24 12:01 PM   Result Value Ref Range    Clonazepam <25 <25 ng/mL    7-Aminoclonazepam <25 <25 ng/mL    Alprazolam <25 <25 ng/mL    Alpha-Hydroxyalprazolam <25 <25 ng/mL    Midazolam <25 <25 ng/mL    Alpha-Hydroxymidazolam <25 <25 ng/mL    Chlordiazepoxide <25 <25 ng/mL    Diazepam <25 <25 ng/mL    Nordiazepam <25 <25 ng/mL    Temazepam <25 <25 ng/mL    Oxazepam <25 <25 ng/mL    Lorazepam <25 <25 ng/mL    Methadone <25 <25 ng/mL    EDDP <25 <25 ng/mL    6-Acetylmorphine <25 <25 ng/mL    Codeine <50 <50 ng/mL    Hydrocodone <25 <25 ng/mL    Hydromorphone <25 <25 ng/mL    Morphine  <50 <50 ng/mL    Norhydrocodone <25 <25 ng/mL    Noroxycodone <25 <25 ng/mL    Oxycodone <25 <25 ng/mL    Oxymorphone <25 <25 ng/mL    Fentanyl <2.5 <2.5 ng/mL    Norfentanyl <2.5 <2.5 ng/mL    Tramadol <50 <50 ng/mL    O-Desmethyltramadol <50 <50 ng/mL    Zolpidem <25 <25 ng/mL    Zolpidem Metabolite (ZCA) 441 (H) <25 ng/mL   Screen Opiate/Opioid/Benzo Prescription Compliance    Collection Time: 06/10/24 12:01 PM   Result Value Ref Range     "Creatinine, Urine Random 36.0 20.0 - 320.0 mg/dL    Amphetamine Screen, Urine Presumptive Negative Presumptive Negative    Barbiturate Screen, Urine Presumptive Negative Presumptive Negative    Cannabinoid Screen, Urine Presumptive Negative Presumptive Negative    Cocaine Metabolite Screen, Urine Presumptive Negative Presumptive Negative    PCP Screen, Urine Presumptive Negative Presumptive Negative     Results are as expected.           Controlled Substance Agreement:  Date of the Last Agreement: 2025  Reviewed Controlled Substance Agreement including but not limited to the benefits, risks, and alternatives to treatment with a Controlled Substance medication(s).    Opioids:  What is the patient's goal of therapy? Pain and sleeping  Is this being achieved with current treatment? yes    I have calculated the patient's Morphine Dose Equivalent (MED):   I have considered referral to Pain Management and/or a specialist, and do not feel it is necessary at this time.    I feel that it is clinically indicated to continue this current medication regimen after consideration of alternative therapies, and other non-opioid treatment.    Opioid Risk Screening:  No data recorded    Pain Assessment:  No data recorded and Sleep Aids:   What is the patient's goal of therapy?sleep  Is this being achieved with current treatment? yes    Activities of Daily Living:   Is your overall impression that this patient is benefiting (symptom reduction outweighs side effects) from sleep aid therapy? Yes     1. Physical Functioning: Better  2. Family Relationship: Better  3. Social Relationship: Better  4. Mood: Better  5. Sleep Patterns: Better  6. Overall Function: Better      Review of Systems   All other systems reviewed and are negative.      Objective   /76 (BP Location: Left arm, Patient Position: Sitting, BP Cuff Size: Large adult)   Pulse 67   Ht 1.676 m (5' 6\")   Wt 83 kg (183 lb)   LMP 09/30/2023   SpO2 97%   BMI 29.54 " kg/m²     Physical Exam  Constitutional:       Appearance: Normal appearance.   HENT:      Head: Normocephalic and atraumatic.      Right Ear: Tympanic membrane and ear canal normal.      Left Ear: Tympanic membrane and ear canal normal.      Mouth/Throat:      Mouth: Mucous membranes are moist.      Pharynx: Oropharynx is clear.   Eyes:      Extraocular Movements: Extraocular movements intact.      Conjunctiva/sclera: Conjunctivae normal.      Pupils: Pupils are equal, round, and reactive to light.   Cardiovascular:      Rate and Rhythm: Normal rate and regular rhythm.      Pulses: Normal pulses.      Heart sounds: Normal heart sounds.   Pulmonary:      Effort: Pulmonary effort is normal.      Breath sounds: Normal breath sounds.   Abdominal:      General: Abdomen is flat. Bowel sounds are normal.      Palpations: Abdomen is soft.   Musculoskeletal:         General: Normal range of motion.      Cervical back: Normal range of motion and neck supple.   Skin:     General: Skin is warm and dry.      Capillary Refill: Capillary refill takes 2 to 3 seconds.   Neurological:      General: No focal deficit present.      Mental Status: She is alert and oriented to person, place, and time. Mental status is at baseline.   Psychiatric:         Mood and Affect: Mood normal.         Behavior: Behavior normal.         Thought Content: Thought content normal.         Judgment: Judgment normal.       Assessment/Plan   1. Chronic bilateral low back pain without sciatica  Stable doing well no issues    2. Primary insomnia  Sleeping well  On ambien  Csa on file  Uds on file  Oarrs reviewed    3. Routine general medical examination at health care facility (Primary)  lab  - 1 Year Follow Up In Primary Care - Wellness Exam; Future    4. Moderate COPD (chronic obstructive pulmonary disease) (Multi)  Breathing well  Sees pulmonologist    5. Essential hypertension  Stable doing well      Tobacco/Alcohol/Opioid use, as well as Illicit Drug  Use was screened for/reviewed and documented in Social Documentation section of the chart and medication list as appropriate    Depression Screening  Depression screening completed using the PHQ-2 questions, with results documented in the chart/encounter (~15min).  (See Rooming Screening section for documentation, or progress note for additional information)    Cardiac Risk Assessment  The ASCVD Risk score (Wendy LEONARDO, et al., 2019) failed to calculate for the following reasons:    Cannot find a previous HDL lab    Cannot find a previous total cholesterol lab  Cardiovascular risk was discussed and, if needed, lifestyle modifications recommended, including nutritional choices, exercise, and elimination of habits contributing to risk. We agreed on a plan to reduce the current cardiovascular risk based on above discussion as needed.     Aspirin use/disuse was discussed and documented in the Problem List of the medical record (under Cardiac Risk Counseling) after reviewing the updated guidelines below:  Consider low dose Aspirin ( mg) use if the benefit for cardiovascular disease prevention outweighs risk for bleeding complications.   In general, low dose ASA should be considered:  In patients WITHOUT prior MI/stroke/PAD (primary prevention):   a. Age <60: Use if 10-year cardiovascular disease risk >20%, with discussion of risks and benefits with patient  b. Age 60-<70: Use if 10-year cardiovascular disease risk >20% and low bleeding (e.g., gastrointestinal) risk, with discussion of risks and benefits with patient  c. Age >=70: Do not use    In patients WITH prior MI/stroke/PAD (secondary prevention):   Generally use unless extremely high bleeding (e.g., gastrointenstinal) risk, with discussion of risks and benefits with patient    Advance Directives Discussion  Advanced Care Planning (including a Living Will, Healthcare POA, as well as specific end of life choices and/or directives), was discussed with the patient  and/or surrogate, voluntarily, and details of that discussion documented in the Problem List (under Advanced Directives Discussion) of the medical record.    (~16 min spent discussing above)     During the course of the visit the patient was educated and counseled about age appropriate screening and preventive services.   Completed preventive screenings were documented in the chart (see Routine Health Maintenance in Problem List) and orders were placed for outstanding screenings/procedures as documented in the Assessment and Plan.

## 2025-06-10 ENCOUNTER — APPOINTMENT (OUTPATIENT)
Dept: CARDIOLOGY | Facility: CLINIC | Age: 75
End: 2025-06-10
Payer: MEDICARE

## 2025-06-12 LAB
AMPHETAMINES UR QL: NEGATIVE NG/ML
BARBITURATES UR QL: NEGATIVE NG/ML
BENZODIAZ UR QL: NEGATIVE NG/ML
BZE UR QL: NEGATIVE NG/ML
CREAT UR-MCNC: 41.1 MG/DL
DRUG SCREEN COMMENT UR-IMP: ABNORMAL
FENTANYL UR QL SCN: NEGATIVE NG/ML
METHADONE UR QL: NEGATIVE NG/ML
NORTRAMADOL UR-MCNC: NEGATIVE NG/ML
OPIATES UR QL: NEGATIVE NG/ML
OXIDANTS UR QL: NEGATIVE MCG/ML
OXYCODONE UR QL: NEGATIVE NG/ML
PCP UR QL: NEGATIVE NG/ML
PH UR: 7 [PH] (ref 4.5–9)
QUEST 6 ACETYLMORPHINE: NEGATIVE NG/ML
QUEST NOTES AND COMMENTS: NORMAL
QUEST ZOLPIDEM: NEGATIVE NG/ML
THC UR QL: NEGATIVE NG/ML
TRAMADOL UR-MCNC: NEGATIVE NG/ML
ZOLPIDEM PHENYL-4-CARB UR CFM-MCNC: 949 NG/ML

## 2025-06-16 NOTE — CPM/PAT H&P
CPM/PAT Evaluation       Name: Galina Chao (Galina Chao)  /Age: 1950/75 y.o.     In-Person       Chief Complaint: Preoperative assessment for upcoming surgery/procedure     HPI  Pleasant 74 y/o female presents with uterovaginal prolapse, incomplete, stress urinary incontinence scheduled for hysterectomy, total, robot-assisted, laparoscopic, uterosacral suspension, posterior repair, sling and cystoscopy on 25. States she has been having symptoms for some time now. Endorses urinary frequency, urgency and discomfort if she cannot find a bathroom quick enough. Endorses discomfort with the prolapse. Denies hematuria. Denies recent fever/illness/chills.    Medical History[1]    Surgical History[2]    Patient  reports that she is not currently sexually active.    Family History[3]    Allergies[4]    Prior to Admission medications    Medication Sig Start Date End Date Taking? Authorizing Provider   albuterol 90 mcg/actuation inhaler Inhale 2 puffs every 4 hours if needed for shortness of breath. 12/3/19   Historical Provider, MD   amLODIPine (Norvasc) 5 mg tablet Take 1 tablet (5 mg) by mouth once daily. 1/10/25   Kameron Cruz MD   aspirin 81 mg EC tablet Take 1 tablet (81 mg) by mouth once daily.    Historical Provider, MD   atenolol (Tenormin) 25 mg tablet TAKE 1 TABLET BY MOUTH ONCE  DAILY 3/27/25   Juvencio Gonzalez DO   atorvastatin (Lipitor) 20 mg tablet TAKE 1 TABLET BY MOUTH DAILY 25   Kameron Cruz MD   biotin 10 mg tablet Take 2 tablets (20 mg) by mouth once daily.    Historical Provider, MD   calcium carbonate (CALCIUM 600 ORAL) 2 times a day.    Historical Provider, MD   cholecalciferol (Vitamin D-3) 50 mcg (2,000 unit) capsule Take 1 capsule (50 mcg) by mouth early in the morning..    Historical Provider, MD   diazePAM (Valium) 5 mg tablet Take one tablet ONE HOUR BEFORE THE MRI. Then take another tablet on arrival to MRI facility  Patient not taking: Reported on 2025 6/10/24   Glo GOMEZ  COLLEEN VillaltaN-CNP   eplerenone (Inspra) 25 mg tablet Take 1 tablet (25 mg) by mouth once daily. 12/12/24 Jason Ice, DO   estradiol (Estrace) 0.01 % (0.1 mg/gram) vaginal cream  4/10/25   Historical Provider, MD   gabapentin (Neurontin) 300 mg capsule TAKE 1 CAPSULE BY MOUTH TWICE  DAILY 5/15/25   Madison Pham APRN-CNP   HYDROcodone-acetaminophen (Norco) 5-325 mg tablet Take 1 tablet by mouth 3 times a day as needed for severe pain (7 - 10). 6/9/25 Jason Ice, DO   hydroxychloroquine (Plaquenil) 200 mg tablet Take 2 tablets (400 mg) by mouth once daily.    Historical Provider, MD   Incruse Ellipta 62.5 mcg/actuation inhalation Inhale 1 puff (62.5 mcg) once daily. 8/14/23 Jason Ice, DO   lansoprazole (Prevacid) 30 mg DR capsule TAKE 1 CAPSULE BY MOUTH IN THE  MORNING 3/5/25   Juvencio Ice, DO   levothyroxine (Synthroid, Levoxyl) 137 mcg tablet TAKE 1 TABLET BY MOUTH daily 11/18/24   Juvencio Ice, DO   methocarbamol (Robaxin) 500 mg tablet Take 1 tablet (500 mg) by mouth if needed for muscle spasms.    Historical Provider, MD   minoxidil (Loniten) 2.5 mg tablet Take 1 tablet (2.5 mg) by mouth once daily. 12/12/24 12/12/25 Jason Ice, DO   multivitamin-iron-minerals-folic acid (Multivitamin 50 Plus) tablet Take 1 tablet by mouth once daily. 5/25/18   Historical Provider, MD   nortriptyline (Pamelor) 25 mg capsule Take 2 capsules (50 mg) by mouth once daily. 3/27/25 3/27/26  Juvencio Ice, DO   nortriptyline (Pamelor) 25 mg capsule TAKE 2 CAPSULES BY MOUTH ONCE  DAILY 3/27/25   Juvencio Ice, DO   zinc gluconate 50 mg tablet Take 1 tablet by mouth once daily.    Historical Provider, MD   zolpidem (Ambien) 10 mg tablet Take 1 tablet (10 mg) by mouth once daily at bedtime. 6/9/25   Juvencio Ice, DO        Constitutional: Negative for fever, chills, or sweats   ENMT: Negative for nasal discharge, congestion, ear pain, mouth pain, throat pain. Positive for glasses.   Respiratory: Negative for cough, wheezing, shortness of breath.  Positive for coughing/SOB.    Cardiac: Negative for chest pain, dyspnea on exertion, palpitations   Gastrointestinal: Negative for nausea, vomiting, diarrhea, constipation, abdominal pain  Genitourinary: Negative for dysuria, flank pain, frequency, hematuria. See HPI   Musculoskeletal: Negative for decreased ROM, pain, swelling, weakness. Positive for generalized pain. Positive for left foot numbness/tingling.   Neurological: Negative for dizziness, confusion, headache  Psychiatric: Negative for mood changes   Skin: Negative for itching, rash, ulcer    Hematologic/Lymph: Negative for bruising, easy bleeding  Allergic/Immunologic: Negative itching, sneezing, swelling      Physical Exam  Vitals reviewed.   Constitutional:       Appearance: Normal appearance.   HENT:      Head: Normocephalic.      Mouth/Throat:      Mouth: Mucous membranes are moist.      Pharynx: Oropharynx is clear.   Eyes:      Pupils: Pupils are equal, round, and reactive to light.   Cardiovascular:      Rate and Rhythm: Normal rate and regular rhythm.      Heart sounds: Normal heart sounds.   Pulmonary:      Effort: Pulmonary effort is normal.      Breath sounds: Normal breath sounds.   Abdominal:      General: Bowel sounds are normal.      Palpations: Abdomen is soft.   Musculoskeletal:         General: Normal range of motion.      Cervical back: Normal range of motion.   Skin:     General: Skin is warm and dry.   Neurological:      General: No focal deficit present.      Mental Status: She is alert and oriented to person, place, and time.   Psychiatric:         Mood and Affect: Mood normal.         Behavior: Behavior normal.          PAT AIRWAY:   Airway:     Mallampati::  II    Neck ROM::  Limited  normal        Testing/Diagnostic:   Nuclear stress from 2022: IMPRESSION:  1. Normal stress myocardial perfusion imaging in response to  pharmacologic stress.  2. Well-maintained left ventricular function.  65%     CXR from 6/9/25: RESULT:   Lines,  "tubes, and devices:  None.   Lungs and pleura:  No consolidation. No lung mass. No pleural effusion.   No pneumothorax.   Cardiomediastinal silhouette:  Normal cardiomediastinal silhouette.   Bones and soft tissues:  Unremarkable.     Patient Specialist/PCP:   PCP\" Dr. Gonzalez  Cardiology: Dr. Cruz  Pulmonology: Dr. Wilson     Visit Vitals  BP (!) 185/77   Pulse 67   Temp 36.6 °C (97.9 °F) (Tympanic)   Resp 16   Ht 1.676 m (5' 6\")   Wt 85.1 kg (187 lb 11.2 oz)   LMP 09/30/2023   SpO2 97%   BMI 30.30 kg/m²   OB Status Postmenopausal   Smoking Status Former   BSA 1.99 m²       DASI Risk Score      Flowsheet Row Pre-Admission Testing from 6/17/2025 in [facility]   Can you take care of yourself (eat, dress, bathe, or use toilet)?  2.75 filed at 06/17/2025 1105   Can you walk indoors, such as around your house? 1.75 filed at 06/17/2025 1105   Can you walk a block or two on level ground?  2.75 filed at 06/17/2025 1105   Can you climb a flight of stairs or walk up a hill? 5.5 filed at 06/17/2025 1105   Can you run a short distance? 0 filed at 06/17/2025 1105   Can you do light work around the house like dusting or washing dishes? 2.7 filed at 06/17/2025 1105   Can you do moderate work around the house like vacuuming, sweeping floors or carrying groceries? 3.5 filed at 06/17/2025 1105   Can you do heavy work around the house like scrubbing floors or lifting and moving heavy furniture?  8 filed at 06/17/2025 1105   Can you do yard work like raking leaves, weeding or pushing a mower? 0 filed at 06/17/2025 1105   Can you have sexual relations? 0 filed at 06/17/2025 1105   Can you participate in moderate recreational activities like golf, bowling, dancing, doubles tennis or throwing a baseball or football? 6 filed at 06/17/2025 1105   Can you participate in strenous sports like swimming, singles tennis, football, basketball, or skiing? 0 filed at 06/17/2025 1105   DASI SCORE 32.95 filed at 06/17/2025 1105   METS " Score (Will be calculated only when all the questions are answered) 6.8 filed at 06/17/2025 1105          Caprini DVT Assessment      Flowsheet Row Pre-Admission Testing from 6/17/2025 in [facility]   DVT Score (IF A SCORE IS NOT CALCULATING, MUST SELECT A BMI TO COMPLETE) 5 filed at 06/17/2025 1104   Surgical Factors Major surgery planned, including arthroscopic and laproscopic (1-2 hours) filed at 06/17/2025 1104   BMI (BMI MUST BE CHOSEN) 30 or less filed at 06/17/2025 1104          Modified Frailty Index      Flowsheet Row Pre-Admission Testing from 6/17/2025 in [facility]   Non-independent functional status (problems with dressing, bathing, personal grooming, or cooking) 0 filed at 06/17/2025 1106   History of diabetes mellitus  0 filed at 06/17/2025 1106   History of COPD 0.0909 filed at 06/17/2025 1106   History of CHF No filed at 06/17/2025 1106   History of MI 0 filed at 06/17/2025 1106   History of Percutaneous Coronary Intervention, Cardiac Surgery, or Angina No filed at 06/17/2025 1106   Hypertension requiring the use of medication  0.0909 filed at 06/17/2025 1106   Peripheral vascular disease 0 filed at 06/17/2025 1106   Impaired sensorium (cognitive impairement or loss, clouding, or delirium) 0 filed at 06/17/2025 1106   TIA or CVA withouy residual deficit 0 filed at 06/17/2025 1106   Cerebrovascular accident with deficit 0 filed at 06/17/2025 1106   Modified Frailty Index Calculator .1818 filed at 06/17/2025 1106          [id number] Stroke Risk Points  Current as of just now        N/A 0 to 9 Points:      Last Change: N/A          The [id number] risk score (Lip MODESTO, et al. 2009. © 2010 [employer]) quantifies the risk of stroke for a patient with atrial fibrillation. For patients without atrial fibrillation or under the age of 18 this score appears as N/A. Higher score values generally indicate higher risk of stroke.        This score is not  applicable to this patient. Components are not calculated.          Revised Cardiac Risk Index      Flowsheet Row Pre-Admission Testing from 6/17/2025 in [facility]   High-Risk Surgery (Intraperitoneal, Intrathoracic,Suprainguinal vascular) 0 filed at 06/17/2025 1105   History of ischemic heart disease (History of MI, History of positive exercuse test, Current chest paint considered due to myocardial ischemia, Use of nitrate therapy, ECG with pathological Q Waves) 0 filed at 06/17/2025 1105   History of congestive heart failure (pulmonary edemia, bilateral rales or S3 gallop, Paroxysmal nocturnal dyspnea, CXR showing pulmonary vascular redistribution) 0 filed at 06/17/2025 1105   History of cerebrovascular disease (Prior TIA or stroke) 0 filed at 06/17/2025 1105   Pre-operative insulin treatment 0 filed at 06/17/2025 1105   Pre-operative creatinine>2 mg/dl 0 filed at 06/17/2025 1105   Revised Cardiac Risk Calculator 0 filed at 06/17/2025 1105          Apfel Simplified Score      Flowsheet Row Pre-Admission Testing from 6/17/2025 in [facility]   Smoking status 1 filed at 06/17/2025 1105   History of motion sickness or PONV  0 filed at 06/17/2025 1105   Use of postoperative opioids 1 filed at 06/17/2025 1105   Gender - Female 1=Yes filed at 06/17/2025 1105   Apfel Simplified Score Calculator 3 filed at 06/17/2025 1105          Risk Analysis Index Results This Encounter         6/17/2025  1106             Do you live in a place other than your own home?: 0    When did you begin living in the place you are currently residing?: Greater than one year ago    Any kidney failure, kidney not working well, or seeing a kidney doctor (nephrologist)? If yes, was this for kidney stones or another problem?: 0 No    Any history of chronic (long-term) congestive heart failure (CHF)?: 0 No    Any shortness of breath when resting?: 0 No    In the past five years, have you been diagnosed with or treated  for cancer?: No    During the last 3 months has it become difficult for you to remember things or organize your thoughts?: 1 Yes    Have you lost weight of 10 pounds or more in the past 3 months without trying?: 0 No    Do you have any loss of appetitie?: 0 No    Getting Around (Mobility): 0 Can get around without help    Eatin Can plan and prepare own meals    Toiletin Can use toilet without any help    Personal Hygiene (Bathing, Hand Washing, Changing Clothes): 0 Can shower or bathe without any help    HO Cancer History: Patient does not indicate history of cancer    Total Risk Analysis Index Score Without Cancer: 27    Total Risk Analysis Index Score: 27          Stop Bang Score      Flowsheet Row Pre-Admission Testing from 2025 in [facility]   Do you snore loudly? 0 filed at 2025 1104   Do you often feel tired or fatigued after your sleep? 0 filed at 2025 1104   Has anyone ever observed you stop breathing in your sleep? 0 filed at 2025 1104   Do you have or are you being treated for high blood pressure? 1 filed at 2025 1104   Recent BMI (Calculated) 30.3 filed at 2025 1104   Is BMI greater than 35 kg/m2? 0=No filed at 2025 1104   Age older than 50 years old? 1=Yes filed at 2025 1104   Is your neck circumference greater than 17 inches (Male) or 16 inches (Female)? 0 filed at 2025 1104   Gender - Male 0=No filed at 2025 1104   STOP-BANG Total Score 2 filed at 2025 1104          Prodigy: High Risk  Total Score: 15              Prodigy Age Score      Prodigy Previous Opioid Use Score           ARISCAT Score for Postoperative Pulmonary Complications      Flowsheet Row Pre-Admission Testing from 2025 in [facility]   Age Calculated Score 3 filed at 2025 1106   Preoperative SpO2 0 filed at 2025 1106   Respiratory infection in the last month Either upper or lower (i.e., URI, bronchitis, pneumonia),  "with fever and antibiotic treatment 0 filed at 06/17/2025 1106   Preoperative anemia (Hgb less than 10 g/dl) 0 filed at 06/17/2025 1106   Surgical incision  0 filed at 06/17/2025 1106   Duration of surgery  16 filed at 06/17/2025 1106   Emergency Procedure  0 filed at 06/17/2025 1106   ARISCAT Total Score  19 filed at 06/17/2025 1106          Linette Perioperative Risk for Myocardial Infarction or Cardiac Arrest (NANCY)      Flowsheet Row Pre-Admission Testing from 6/17/2025 in [facility]   Calculated Age Score 1.48 filed at 06/17/2025 1107   Functional Status  0 filed at 06/17/2025 1107   ASA Class  -3.29 filed at 06/17/2025 1107   Creatinine -0.10 filed at 06/17/2025 1107   Type of Procedure  0.76 filed at 06/17/2025 1107   NANCY Total Score  -6.4 filed at 06/17/2025 1107   NANCY % 0.17 filed at 06/17/2025 1107            Assessment and Plan:     Assessment and Plan:     Preop:   OR with Dr. Gore on 7/1/25   Labs ordered per Dr. Gore.   EKG obtained and enclosed. NSR. VR: 66 BPM      Per pulmonary note: \"As long as her above symptoms improved, she can proceed with surgery. She was instructed to contact office if they do not because that would put her at a significant higher risk of pulmonary complications from surgery \". She continues to have cough/SOB but stated that her CXR was good so she is fine. Dr. Gore was updated on this.     Per cardiology note: \"Preoperative evaluation. Latest imaging study showed no ischemia with a left ventricular ejection fraction of 65%. She is therefore cleared for the operative procedure with an acceptable cardiac risk. Can stop aspirin 5 days prior to the procedure.\"    HEENT:   Oral lichen planus: Follows with dermatology. Takes plaquenil daily-going to receive instructions from derm about plaquenil and surgery     Neurologic:   The patient is at an increased risk for post operative delirium secondary to age >/=65 , decrease functional status, polypharmacy , and " "type and duration of surgery . Preoperative brain exercise educational handout provided to patient.  The patient is at an increased risk for perioperative stroke secondary to cardiac disease, increased age, HTN, HLD, female sex , and general anesthesia.    Cardiac:  Hypertension: Usually stable per patient. Elevated today-she mentioned her grand-daughter is staying with her and she is not sleeping well and seemed a little anxious about upcoming procedure. She is going to check BP later when she is more relaxed. She verbalized understanding that she would call PCP if it remains elevated.   Hyperlipidemia: On statin   CAD: S/P stent in 2016   Follows with Dr. Cruz-cardiology   [facility] Activity Status Index (DASI)  DASI Score: 32.95   MET Score: 6.8  RCRI  0 which is 3.9% 30 day risk of MACE (risk for cardiac death, nonfatal myocardial infarction, and nonfactal cardiac arrest)  NANCY score which indicates a   0.17% risk of intraoperative or 30-day postoperative MACE    Pulmonary:  COPD: Mild. Uses inhalers appropriately. Does have current cough/SOB. Recently seen by pulmonology who noted that this was likely just due to weather and smoke exposure from [employer]. CXR was obtained-No acute concerns noted   STOP-BANG score of  2. Low risk of obstructive sleep apnea.   ARISCAT: 19  points which is a low (1.6%) risk of in-hospital post-op pulmonary complications     Endocrine:  Hypothyroidism: On levothyroxine     GI:  GERD: Stable on lansoprazole   Apfel: 3 points 61% risk for post operative N/V    /Renal:     Neuro-muscular:   Osteoarthritis: States \"everything hurts\". States she sometimes walks \"off balance\" due to back pain and prior back surgeries.     Psychiatric:   Insomnia: Uses ambien as needed     Hematologic:   Caprini score 5, patient at high risk for perioperative DVT. Patient provided with VTE education/handout.     Skin check: Patient was instructed to make surgeon aware of any skin changes/concerns prior " to surgery.     Anesthesia: No history of anesthesia complications. No anesthesia concerns.      *See risk scores as previously documented     I spent a total of  40  minutes on the date of the service which included reviewing the patient's chart prior to appt, face-to-face patient care,  performing a physical examination, completing clinical documentation, discussing assessment and plan with the patient/family/caregiver, educating the patient/family/caregiver on pre-operative medication instructions and ordering tests if applicable.         [1]   Past Medical History:  Diagnosis Date    Allergic     Arthritis     COPD (chronic obstructive pulmonary disease) (Multi)     Disease of thyroid gland     Diverticulosis of intestine, part unspecified, without perforation or abscess without bleeding     Diverticulosis    Emphysema of lung (Multi)     GERD (gastroesophageal reflux disease)     Heart disease     Heart murmur     Hypertension     Inflammatory bowel disease     Obesity, unspecified     Adult-onset obesity    Personal history of other diseases of the circulatory system     History of hypertension    Personal history of other diseases of the circulatory system 10/09/2020    History of hypertension    Personal history of other diseases of the circulatory system     History of cardiac murmur    Personal history of other diseases of the digestive system     History of gastroesophageal reflux (GERD)    Personal history of other diseases of the digestive system     History of irritable bowel syndrome    Personal history of other diseases of the musculoskeletal system and connective tissue     History of arthritis    Personal history of other diseases of the musculoskeletal system and connective tissue     History of arthritis    Personal history of other diseases of the musculoskeletal system and connective tissue     History of fibromyalgia    Personal history of other diseases of the respiratory system     History  of chronic obstructive lung disease    Personal history of other diseases of the respiratory system     History of pulmonary emphysema    Personal history of other endocrine, nutritional and metabolic disease     History of thyroid disorder   [2]   Past Surgical History:  Procedure Laterality Date    APPENDECTOMY  04/27/2018    Appendectomy    BACK SURGERY  10/12/2017    Back Surgery x5- x2 cervical, x3 lumbar. Cervical-states she has a preet    BREAST BIOPSY Left     benign core bx    CARDIAC CATHETERIZATION      CARDIAC SURGERY  10/12/2017    Heart Surgery    CHOLECYSTECTOMY  05/24/2016    Cholecystectomy    ENDOMETRIAL ABLATION      JOINT REPLACEMENT      OTHER SURGICAL HISTORY  06/15/2022    Knee replacement    OTHER SURGICAL HISTORY  06/15/2022    Endometrial ablation    OTHER SURGICAL HISTORY  04/27/2018    Previous Stent Placement    OTHER SURGICAL HISTORY  04/27/2018    Abdominal Surgery   [3]   Family History  Problem Relation Name Age of Onset    Breast cancer Mother JEFRY GRANT 55    Hypertension Mother JEFRY GRANT     Cancer Father ISA GRANT JR     Hypertension Father ISA GRANT JR     Leukemia Father ISA GRANT JR     Blood Disorder Father ISA GRANT JR     Hypertension Sister      Hyperlipidemia Sister      Kidney disease Brother ISA GRANT JR     Stroke Brother ISA GRANT JR     Other (ischemic heart disease) Other      Cancer Maternal Grandmother GOOD APONTE    [4]   Allergies  Allergen Reactions    Ciprofloxacin Swelling and Angioedema     facial swelling    Sulfa (Sulfonamide Antibiotics) Swelling    Indocin [Indomethacin] Itching and GI Upset    Nsaids (Non-Steroidal Anti-Inflammatory Drug) GI Upset

## 2025-06-16 NOTE — H&P (VIEW-ONLY)
CPM/PAT Evaluation       Name: Galina Chao (Galina Chao)  /Age: 1950/75 y.o.     In-Person       Chief Complaint: Preoperative assessment for upcoming surgery/procedure     HPI  Pleasant 76 y/o female presents with uterovaginal prolapse, incomplete, stress urinary incontinence scheduled for hysterectomy, total, robot-assisted, laparoscopic, uterosacral suspension, posterior repair, sling and cystoscopy on 25. States she has been having symptoms for some time now. Endorses urinary frequency, urgency and discomfort if she cannot find a bathroom quick enough. Endorses discomfort with the prolapse. Denies hematuria. Denies recent fever/illness/chills.    Medical History[1]    Surgical History[2]    Patient  reports that she is not currently sexually active.    Family History[3]    Allergies[4]    Prior to Admission medications    Medication Sig Start Date End Date Taking? Authorizing Provider   albuterol 90 mcg/actuation inhaler Inhale 2 puffs every 4 hours if needed for shortness of breath. 12/3/19   Historical Provider, MD   amLODIPine (Norvasc) 5 mg tablet Take 1 tablet (5 mg) by mouth once daily. 1/10/25   Kameron Cruz MD   aspirin 81 mg EC tablet Take 1 tablet (81 mg) by mouth once daily.    Historical Provider, MD   atenolol (Tenormin) 25 mg tablet TAKE 1 TABLET BY MOUTH ONCE  DAILY 3/27/25   Juvencio Gonzalez DO   atorvastatin (Lipitor) 20 mg tablet TAKE 1 TABLET BY MOUTH DAILY 25   Kameron Cruz MD   biotin 10 mg tablet Take 2 tablets (20 mg) by mouth once daily.    Historical Provider, MD   calcium carbonate (CALCIUM 600 ORAL) 2 times a day.    Historical Provider, MD   cholecalciferol (Vitamin D-3) 50 mcg (2,000 unit) capsule Take 1 capsule (50 mcg) by mouth early in the morning..    Historical Provider, MD   diazePAM (Valium) 5 mg tablet Take one tablet ONE HOUR BEFORE THE MRI. Then take another tablet on arrival to MRI facility  Patient not taking: Reported on 2025 6/10/24   Glo GOMEZ  COLLEEN VillaltaN-CNP   eplerenone (Inspra) 25 mg tablet Take 1 tablet (25 mg) by mouth once daily. 12/12/24 Jason Ice, DO   estradiol (Estrace) 0.01 % (0.1 mg/gram) vaginal cream  4/10/25   Historical Provider, MD   gabapentin (Neurontin) 300 mg capsule TAKE 1 CAPSULE BY MOUTH TWICE  DAILY 5/15/25   Madison Pham APRN-CNP   HYDROcodone-acetaminophen (Norco) 5-325 mg tablet Take 1 tablet by mouth 3 times a day as needed for severe pain (7 - 10). 6/9/25 Jason Ice, DO   hydroxychloroquine (Plaquenil) 200 mg tablet Take 2 tablets (400 mg) by mouth once daily.    Historical Provider, MD   Incruse Ellipta 62.5 mcg/actuation inhalation Inhale 1 puff (62.5 mcg) once daily. 8/14/23 Jason Ice, DO   lansoprazole (Prevacid) 30 mg DR capsule TAKE 1 CAPSULE BY MOUTH IN THE  MORNING 3/5/25   Juvencio Ice, DO   levothyroxine (Synthroid, Levoxyl) 137 mcg tablet TAKE 1 TABLET BY MOUTH daily 11/18/24   Juvencio Ice, DO   methocarbamol (Robaxin) 500 mg tablet Take 1 tablet (500 mg) by mouth if needed for muscle spasms.    Historical Provider, MD   minoxidil (Loniten) 2.5 mg tablet Take 1 tablet (2.5 mg) by mouth once daily. 12/12/24 12/12/25 Jason Ice, DO   multivitamin-iron-minerals-folic acid (Multivitamin 50 Plus) tablet Take 1 tablet by mouth once daily. 5/25/18   Historical Provider, MD   nortriptyline (Pamelor) 25 mg capsule Take 2 capsules (50 mg) by mouth once daily. 3/27/25 3/27/26  Juvencio Ice, DO   nortriptyline (Pamelor) 25 mg capsule TAKE 2 CAPSULES BY MOUTH ONCE  DAILY 3/27/25   Juvencio Ice, DO   zinc gluconate 50 mg tablet Take 1 tablet by mouth once daily.    Historical Provider, MD   zolpidem (Ambien) 10 mg tablet Take 1 tablet (10 mg) by mouth once daily at bedtime. 6/9/25   Juvencio Ice, DO        Constitutional: Negative for fever, chills, or sweats   ENMT: Negative for nasal discharge, congestion, ear pain, mouth pain, throat pain. Positive for glasses.   Respiratory: Negative for cough, wheezing, shortness of breath.  Positive for coughing/SOB.    Cardiac: Negative for chest pain, dyspnea on exertion, palpitations   Gastrointestinal: Negative for nausea, vomiting, diarrhea, constipation, abdominal pain  Genitourinary: Negative for dysuria, flank pain, frequency, hematuria. See HPI   Musculoskeletal: Negative for decreased ROM, pain, swelling, weakness. Positive for generalized pain. Positive for left foot numbness/tingling.   Neurological: Negative for dizziness, confusion, headache  Psychiatric: Negative for mood changes   Skin: Negative for itching, rash, ulcer    Hematologic/Lymph: Negative for bruising, easy bleeding  Allergic/Immunologic: Negative itching, sneezing, swelling      Physical Exam  Vitals reviewed.   Constitutional:       Appearance: Normal appearance.   HENT:      Head: Normocephalic.      Mouth/Throat:      Mouth: Mucous membranes are moist.      Pharynx: Oropharynx is clear.   Eyes:      Pupils: Pupils are equal, round, and reactive to light.   Cardiovascular:      Rate and Rhythm: Normal rate and regular rhythm.      Heart sounds: Normal heart sounds.   Pulmonary:      Effort: Pulmonary effort is normal.      Breath sounds: Normal breath sounds.   Abdominal:      General: Bowel sounds are normal.      Palpations: Abdomen is soft.   Musculoskeletal:         General: Normal range of motion.      Cervical back: Normal range of motion.   Skin:     General: Skin is warm and dry.   Neurological:      General: No focal deficit present.      Mental Status: She is alert and oriented to person, place, and time.   Psychiatric:         Mood and Affect: Mood normal.         Behavior: Behavior normal.          PAT AIRWAY:   Airway:     Mallampati::  II    Neck ROM::  Limited  normal        Testing/Diagnostic:   Nuclear stress from 2022: IMPRESSION:  1. Normal stress myocardial perfusion imaging in response to  pharmacologic stress.  2. Well-maintained left ventricular function.  65%     CXR from 6/9/25: RESULT:   Lines,  "tubes, and devices:  None.   Lungs and pleura:  No consolidation. No lung mass. No pleural effusion.   No pneumothorax.   Cardiomediastinal silhouette:  Normal cardiomediastinal silhouette.   Bones and soft tissues:  Unremarkable.     Patient Specialist/PCP:   PCP\" Dr. Gonzalez  Cardiology: Dr. Cruz  Pulmonology: Dr. Wilson     Visit Vitals  BP (!) 185/77   Pulse 67   Temp 36.6 °C (97.9 °F) (Tympanic)   Resp 16   Ht 1.676 m (5' 6\")   Wt 85.1 kg (187 lb 11.2 oz)   LMP 09/30/2023   SpO2 97%   BMI 30.30 kg/m²   OB Status Postmenopausal   Smoking Status Former   BSA 1.99 m²       DASI Risk Score      Flowsheet Row Pre-Admission Testing from 6/17/2025 in [facility] - [place], [place]   Can you take care of yourself (eat, dress, bathe, or use toilet)?  2.75 filed at 06/17/2025 1105   Can you walk indoors, such as around your house? 1.75 filed at 06/17/2025 1105   Can you walk a block or two on level ground?  2.75 filed at 06/17/2025 1105   Can you climb a flight of stairs or walk up a hill? 5.5 filed at 06/17/2025 1105   Can you run a short distance? 0 filed at 06/17/2025 1105   Can you do light work around the house like dusting or washing dishes? 2.7 filed at 06/17/2025 1105   Can you do moderate work around the house like vacuuming, sweeping floors or carrying groceries? 3.5 filed at 06/17/2025 1105   Can you do heavy work around the house like scrubbing floors or lifting and moving heavy furniture?  8 filed at 06/17/2025 1105   Can you do yard work like raking leaves, weeding or pushing a mower? 0 filed at 06/17/2025 1105   Can you have sexual relations? 0 filed at 06/17/2025 1105   Can you participate in moderate recreational activities like golf, bowling, dancing, doubles tennis or throwing a baseball or football? 6 filed at 06/17/2025 1105   Can you participate in strenous sports like swimming, singles tennis, football, basketball, or skiing? 0 filed at 06/17/2025 1105   DASI SCORE 32.95 filed at 06/17/2025 1105   METS " Score (Will be calculated only when all the questions are answered) 6.8 filed at 06/17/2025 1105          Caprini DVT Assessment      Flowsheet Row Pre-Admission Testing from 6/17/2025 in [facility] - [place]   DVT Score (IF A SCORE IS NOT CALCULATING, MUST SELECT A BMI TO COMPLETE) 5 filed at 06/17/2025 1104   Surgical Factors Major surgery planned, including arthroscopic and laproscopic (1-2 hours) filed at 06/17/2025 1104   BMI (BMI MUST BE CHOSEN) 30 or less filed at 06/17/2025 1104          Modified Frailty Index      Flowsheet Row Pre-Admission Testing from 6/17/2025 in [facility] [place]   Non-independent functional status (problems with dressing, bathing, personal grooming, or cooking) 0 filed at 06/17/2025 1106   History of diabetes mellitus  0 filed at 06/17/2025 1106   History of COPD 0.0909 filed at 06/17/2025 1106   History of CHF No filed at 06/17/2025 1106   History of MI 0 filed at 06/17/2025 1106   History of Percutaneous Coronary Intervention, Cardiac Surgery, or Angina No filed at 06/17/2025 1106   Hypertension requiring the use of medication  0.0909 filed at 06/17/2025 1106   Peripheral vascular disease 0 filed at 06/17/2025 1106   Impaired sensorium (cognitive impairement or loss, clouding, or delirium) 0 filed at 06/17/2025 1106   TIA or CVA withouy residual deficit 0 filed at 06/17/2025 1106   Cerebrovascular accident with deficit 0 filed at 06/17/2025 1106   Modified Frailty Index Calculator .1818 filed at 06/17/2025 1106          [id number] Stroke Risk Points  Current as of just now        N/A 0 to 9 Points:      Last Change: N/A          The [id number] risk score (Lip MODESTO, et al. 2009. © 2010 [employer]) quantifies the risk of stroke for a patient with atrial fibrillation. For patients without atrial fibrillation or under the age of 18 this score appears as N/A. Higher score values generally indicate higher risk of stroke.        This score is not  applicable to this patient. Components are not calculated.          Revised Cardiac Risk Index      Flowsheet Row Pre-Admission Testing from 6/17/2025 in [facility] - [place]   High-Risk Surgery (Intraperitoneal, Intrathoracic,Suprainguinal vascular) 0 filed at 06/17/2025 1105   History of ischemic heart disease (History of MI, History of positive exercuse test, Current chest paint considered due to myocardial ischemia, Use of nitrate therapy, ECG with pathological Q Waves) 0 filed at 06/17/2025 1105   History of congestive heart failure (pulmonary edemia, bilateral rales or S3 gallop, Paroxysmal nocturnal dyspnea, CXR showing pulmonary vascular redistribution) 0 filed at 06/17/2025 1105   History of cerebrovascular disease (Prior TIA or stroke) 0 filed at 06/17/2025 1105   Pre-operative insulin treatment 0 filed at 06/17/2025 1105   Pre-operative creatinine>2 mg/dl 0 filed at 06/17/2025 1105   Revised Cardiac Risk Calculator 0 filed at 06/17/2025 1105          Apfel Simplified Score      Flowsheet Row Pre-Admission Testing from 6/17/2025 in [facility] [place]   Smoking status 1 filed at 06/17/2025 1105   History of motion sickness or PONV  0 filed at 06/17/2025 1105   Use of postoperative opioids 1 filed at 06/17/2025 1105   Gender - Female 1=Yes filed at 06/17/2025 1105   Apfel Simplified Score Calculator 3 filed at 06/17/2025 1105          Risk Analysis Index Results This Encounter         6/17/2025  1106             Do you live in a place other than your own home?: 0    When did you begin living in the place you are currently residing?: Greater than one year ago    Any kidney failure, kidney not working well, or seeing a kidney doctor (nephrologist)? If yes, was this for kidney stones or another problem?: 0 No    Any history of chronic (long-term) congestive heart failure (CHF)?: 0 No    Any shortness of breath when resting?: 0 No    In the past five years, have you been diagnosed with or treated  for cancer?: No    During the last 3 months has it become difficult for you to remember things or organize your thoughts?: 1 Yes    Have you lost weight of 10 pounds or more in the past 3 months without trying?: 0 No    Do you have any loss of appetitie?: 0 No    Getting Around (Mobility): 0 Can get around without help    Eatin Can plan and prepare own meals    Toiletin Can use toilet without any help    Personal Hygiene (Bathing, Hand Washing, Changing Clothes): 0 Can shower or bathe without any help    HO Cancer History: Patient does not indicate history of cancer    Total Risk Analysis Index Score Without Cancer: 27    Total Risk Analysis Index Score: 27          Stop Bang Score      [place] [facility] [place] you snore loudly? 0 filed at 2025 1104   Do you often feel tired or fatigued after your sleep? 0 filed at 2025 1104   Has anyone ever observed you stop breathing in your sleep? 0 filed at 2025 1104   Do you have or are you being treated for high blood pressure? 1 filed at 2025 1104   Recent BMI (Calculated) 30.3 filed at 2025 1104   Is BMI greater than 35 kg/m2? 0=No filed at 2025 1104   Age older than 50 years old? 1=Yes filed at 2025 1104   Is your neck circumference greater than 17 inches (Male) or 16 inches (Female)? 0 filed at 2025 1104   Gender - Male 0=No filed at 2025 1104   STOP-BANG Total Score 2 filed at 2025 1104          Prodigy: High Risk  Total Score: 15              Prodigy Age Score      Prodigy Previous Opioid Use Score           ARISCAT Score for Postoperative Pulmonary Complications      Flowsheet [place] [facility] [place] Calculated Score 3 filed at 2025 1106   Preoperative SpO2 0 filed at 2025 1106   Respiratory infection in the last month Either upper or lower (i.e., URI, bronchitis, pneumonia),  "with fever and antibiotic treatment 0 filed at 06/17/2025 1106   Preoperative anemia (Hgb less than 10 g/dl) 0 filed at 06/17/2025 1106   Surgical incision  0 filed at 06/17/2025 1106   Duration of surgery  16 filed at 06/17/2025 1106   Emergency Procedure  0 filed at 06/17/2025 1106   ARISCAT Total Score  19 filed at 06/17/2025 1106          Linette Perioperative Risk for Myocardial Infarction or Cardiac Arrest (NANCY)      Flowsheet Row Pre-Admission [place]17[place] [facility] [place]   Calculated Age Score 1.48 filed at 06/17/2025 1107   Functional Status  0 filed at 06/17/2025 1107   ASA Class  -3.29 filed at 06/17/2025 1107   Creatinine -0.10 filed at 06/17/2025 1107   Type of Procedure  0.76 filed at 06/17/2025 1107   NANCY Total Score  -6.4 filed at 06/17/2025 1107   NANCY % 0.17 filed at 06/17/2025 1107            Assessment and Plan:     Assessment and Plan:     Preop:   OR with Dr. Gore on 7/1/25   Labs ordered per Dr. Gore.   EKG obtained and enclosed. NSR. VR: 66 BPM      Per pulmonary note: \"As long as her above symptoms improved, she can proceed with surgery. She was instructed to contact office if they do not because that would put her at a significant higher risk of pulmonary complications from surgery \". She continues to have cough/SOB but stated that her CXR was good so she is fine. Dr. Gore was updated on this.     Per cardiology note: \"Preoperative evaluation. Latest imaging study showed no ischemia with a left ventricular ejection fraction of 65%. She is therefore cleared for the operative procedure with an acceptable cardiac risk. Can stop aspirin 5 days prior to the procedure.\"    HEENT:   Oral lichen planus: Follows with dermatology. Takes plaquenil daily-going to receive instructions from derm about plaquenil and surgery     Neurologic:   The patient is at an increased risk for post operative delirium secondary to age >/=65 , decrease functional status, polypharmacy , and " "type and duration of surgery . Preoperative brain exercise educational handout provided to patient.  The patient is at an increased risk for perioperative stroke secondary to cardiac disease, increased age, HTN, HLD, female sex , and general anesthesia.    Cardiac:  Hypertension: Usually stable per patient. Elevated today-she mentioned her grand-daughter is staying with her and she is not sleeping well and seemed a little anxious about upcoming procedure. She is going to check BP later when she is more relaxed. She verbalized understanding that she would call PCP if it remains elevated.   Hyperlipidemia: On statin   CAD: S/P stent in 2016   Follows with Dr. Cruz-cardiology   Duke Activity Status Index (DASI)  DASI Score: 32.95   MET Score: 6.8  RCRI  0 which is 3.9% 30 day risk of MACE (risk for cardiac death, nonfatal myocardial infarction, and nonfactal cardiac arrest)  NANCY score which indicates a   0.17% risk of intraoperative or 30-day postoperative MACE    Pulmonary:  COPD: Mild. Uses inhalers appropriately. Does have current cough/SOB. Recently seen by pulmonology who noted that this was likely just due to weather and smoke exposure from [email or web address]. CXR was obtained-No acute concerns noted   STOP-BANG score of  2. Low risk of obstructive sleep apnea.   ARISCAT: 19  points which is a low (1.6%) risk of in-hospital post-op pulmonary complications     Endocrine:  Hypothyroidism: On levothyroxine     GI:  GERD: Stable on lansoprazole   Apfel: 3 points 61% risk for post operative N/V    /Renal:     Neuro-muscular:   Osteoarthritis: States \"everything hurts\". States she sometimes walks \"off balance\" due to back pain and prior back surgeries.     Psychiatric:   Insomnia: Uses ambien as needed     Hematologic:   Caprini score 5, patient at high risk for perioperative DVT. Patient provided with VTE education/handout.     Skin check: Patient was instructed to make surgeon aware of any skin changes/concerns prior " to surgery.     Anesthesia: No history of anesthesia complications. No anesthesia concerns.      *See risk scores as previously documented     I spent a total of  40  minutes on the date of the service which included reviewing the patient's chart prior to appt, face-to-face patient care,  performing a physical examination, completing clinical documentation, discussing assessment and plan with the patient/family/caregiver, educating the patient/family/caregiver on pre-operative medication instructions and ordering tests if applicable.         [1]   Past Medical History:  Diagnosis Date    Allergic     Arthritis     COPD (chronic obstructive pulmonary disease) (Multi)     Disease of thyroid gland     Diverticulosis of intestine, part unspecified, without perforation or abscess without bleeding     Diverticulosis    Emphysema of lung (Multi)     GERD (gastroesophageal reflux disease)     Heart disease     Heart murmur     Hypertension     Inflammatory bowel disease     Obesity, unspecified     Adult-onset obesity    Personal history of other diseases of the circulatory system     History of hypertension    Personal history of other diseases of the circulatory system 10/09/2020    History of hypertension    Personal history of other diseases of the circulatory system     History of cardiac murmur    Personal history of other diseases of the digestive system     History of gastroesophageal reflux (GERD)    Personal history of other diseases of the digestive system     History of irritable bowel syndrome    Personal history of other diseases of the musculoskeletal system and connective tissue     History of arthritis    Personal history of other diseases of the musculoskeletal system and connective tissue     History of arthritis    Personal history of other diseases of the musculoskeletal system and connective tissue     History of fibromyalgia    Personal history of other diseases of the respiratory system     History  of chronic obstructive lung disease    Personal history of other diseases of the respiratory system     History of pulmonary emphysema    Personal history of other endocrine, nutritional and metabolic disease     History of thyroid disorder   [2]   Past Surgical History:  Procedure Laterality Date    APPENDECTOMY  04/27/2018    Appendectomy    BACK SURGERY  10/12/2017    Back Surgery x5- x2 cervical, x3 lumbar. Cervical-states she has a preet    BREAST BIOPSY Left     benign core bx    CARDIAC CATHETERIZATION      CARDIAC SURGERY  10/12/2017    Heart Surgery    CHOLECYSTECTOMY  05/24/2016    Cholecystectomy    ENDOMETRIAL ABLATION      JOINT REPLACEMENT      OTHER SURGICAL HISTORY  06/15/2022    Knee replacement    OTHER SURGICAL HISTORY  06/15/2022    Endometrial ablation    OTHER SURGICAL HISTORY  04/27/2018    Previous Stent Placement    OTHER SURGICAL HISTORY  04/27/2018    Abdominal Surgery   [3]   Family History  Problem Relation Name Age of Onset    Breast cancer Mother JEFRY GRANT 55    Hypertension Mother JEFRY GRANT     Cancer Father ISA GRANT JR     Hypertension Father ISA GRANT JR     Leukemia Father ISA GRANT JR     Blood Disorder Father ISA GRANT JR     Hypertension Sister      Hyperlipidemia Sister      Kidney disease Brother ISA GRANT JR     Stroke Brother ISA GRANT JR     Other (ischemic heart disease) Other      Cancer Maternal Grandmother GOOD APONTE    [4]   Allergies  Allergen Reactions    Ciprofloxacin Swelling and Angioedema     facial swelling    Sulfa (Sulfonamide Antibiotics) Swelling    Indocin [Indomethacin] Itching and GI Upset    Nsaids (Non-Steroidal Anti-Inflammatory Drug) GI Upset

## 2025-06-17 ENCOUNTER — PRE-ADMISSION TESTING (OUTPATIENT)
Dept: PREADMISSION TESTING | Facility: HOSPITAL | Age: 75
End: 2025-06-17
Payer: MEDICARE

## 2025-06-17 ENCOUNTER — LAB (OUTPATIENT)
Dept: LAB | Facility: HOSPITAL | Age: 75
End: 2025-06-17
Payer: MEDICARE

## 2025-06-17 VITALS
BODY MASS INDEX: 30.17 KG/M2 | OXYGEN SATURATION: 97 % | HEART RATE: 67 BPM | WEIGHT: 187.7 LBS | DIASTOLIC BLOOD PRESSURE: 77 MMHG | RESPIRATION RATE: 16 BRPM | TEMPERATURE: 97.9 F | SYSTOLIC BLOOD PRESSURE: 185 MMHG | HEIGHT: 66 IN

## 2025-06-17 DIAGNOSIS — Z01.818 PREOP EXAMINATION: Primary | ICD-10-CM

## 2025-06-17 DIAGNOSIS — N81.2 UTEROVAGINAL PROLAPSE, INCOMPLETE: ICD-10-CM

## 2025-06-17 DIAGNOSIS — N12 TUBULO-INTERSTITIAL NEPHRITIS, NOT SPECIFIED AS ACUTE OR CHRONIC: Primary | ICD-10-CM

## 2025-06-17 DIAGNOSIS — N39.3 STRESS INCONTINENCE (FEMALE) (MALE): ICD-10-CM

## 2025-06-17 DIAGNOSIS — N39.3 SUI (STRESS URINARY INCONTINENCE, FEMALE): ICD-10-CM

## 2025-06-17 DIAGNOSIS — M81.0 SENILE OSTEOPOROSIS: ICD-10-CM

## 2025-06-17 DIAGNOSIS — M50.00 CERVICAL DISC DISEASE WITH MYELOPATHY: ICD-10-CM

## 2025-06-17 LAB
ABO GROUP (TYPE) IN BLOOD: NORMAL
ANION GAP SERPL CALC-SCNC: 14 MMOL/L (ref 10–20)
ANTIBODY SCREEN: NORMAL
APPEARANCE UR: CLEAR
BILIRUB UR STRIP.AUTO-MCNC: NEGATIVE MG/DL
BUN SERPL-MCNC: 11 MG/DL (ref 6–23)
CALCIUM SERPL-MCNC: 9.7 MG/DL (ref 8.6–10.3)
CHLORIDE SERPL-SCNC: 103 MMOL/L (ref 98–107)
CO2 SERPL-SCNC: 25 MMOL/L (ref 21–32)
COLOR UR: COLORLESS
CREAT SERPL-MCNC: 0.68 MG/DL (ref 0.5–1.05)
EGFRCR SERPLBLD CKD-EPI 2021: >90 ML/MIN/1.73M*2
ERYTHROCYTE [DISTWIDTH] IN BLOOD BY AUTOMATED COUNT: 12.7 % (ref 11.5–14.5)
GLUCOSE SERPL-MCNC: 81 MG/DL (ref 74–99)
GLUCOSE UR STRIP.AUTO-MCNC: NORMAL MG/DL
HCT VFR BLD AUTO: 41.3 % (ref 36–46)
HGB BLD-MCNC: 13.3 G/DL (ref 12–16)
HOLD SPECIMEN: NORMAL
KETONES UR STRIP.AUTO-MCNC: NEGATIVE MG/DL
LEUKOCYTE ESTERASE UR QL STRIP.AUTO: NEGATIVE
MCH RBC QN AUTO: 29.9 PG (ref 26–34)
MCHC RBC AUTO-ENTMCNC: 32.2 G/DL (ref 32–36)
MCV RBC AUTO: 93 FL (ref 80–100)
NITRITE UR QL STRIP.AUTO: NEGATIVE
NRBC BLD-RTO: 0 /100 WBCS (ref 0–0)
PH UR STRIP.AUTO: 6.5 [PH]
PLATELET # BLD AUTO: 264 X10*3/UL (ref 150–450)
POTASSIUM SERPL-SCNC: 4.2 MMOL/L (ref 3.5–5.3)
PROT UR STRIP.AUTO-MCNC: NEGATIVE MG/DL
RBC # BLD AUTO: 4.45 X10*6/UL (ref 4–5.2)
RBC # UR STRIP.AUTO: NEGATIVE MG/DL
RH FACTOR (ANTIGEN D): NORMAL
SODIUM SERPL-SCNC: 138 MMOL/L (ref 136–145)
SP GR UR STRIP.AUTO: 1
UROBILINOGEN UR STRIP.AUTO-MCNC: NORMAL MG/DL
WBC # BLD AUTO: 6.5 X10*3/UL (ref 4.4–11.3)

## 2025-06-17 PROCEDURE — 87081 CULTURE SCREEN ONLY: CPT | Mod: STJLAB

## 2025-06-17 PROCEDURE — 80048 BASIC METABOLIC PNL TOTAL CA: CPT

## 2025-06-17 PROCEDURE — 86850 RBC ANTIBODY SCREEN: CPT

## 2025-06-17 PROCEDURE — 86901 BLOOD TYPING SEROLOGIC RH(D): CPT

## 2025-06-17 PROCEDURE — 81003 URINALYSIS AUTO W/O SCOPE: CPT

## 2025-06-17 PROCEDURE — 86900 BLOOD TYPING SEROLOGIC ABO: CPT

## 2025-06-17 PROCEDURE — 85027 COMPLETE CBC AUTOMATED: CPT

## 2025-06-17 PROCEDURE — 99203 OFFICE O/P NEW LOW 30 MIN: CPT

## 2025-06-17 PROCEDURE — 36415 COLL VENOUS BLD VENIPUNCTURE: CPT

## 2025-06-17 RX ORDER — CHLORHEXIDINE GLUCONATE ORAL RINSE 1.2 MG/ML
SOLUTION DENTAL
Qty: 473 ML | Refills: 0 | Status: SHIPPED | OUTPATIENT
Start: 2025-06-17

## 2025-06-17 ASSESSMENT — DUKE ACTIVITY SCORE INDEX (DASI)
CAN YOU RUN A SHORT DISTANCE: NO
CAN YOU DO LIGHT WORK AROUND THE HOUSE LIKE DUSTING OR WASHING DISHES: YES
CAN YOU WALK A BLOCK OR TWO ON LEVEL GROUND: YES
CAN YOU DO HEAVY WORK AROUND THE HOUSE LIKE SCRUBBING FLOORS OR LIFTING AND MOVING HEAVY FURNITURE: YES
CAN YOU DO YARD WORK LIKE RAKING LEAVES, WEEDING OR PUSHING A MOWER: NO
CAN YOU WALK INDOORS, SUCH AS AROUND YOUR HOUSE: YES
CAN YOU HAVE SEXUAL RELATIONS: NO
CAN YOU TAKE CARE OF YOURSELF (EAT, DRESS, BATHE, OR USE TOILET): YES
CAN YOU PARTICIPATE IN MODERATE RECREATIONAL ACTIVITIES LIKE GOLF, BOWLING, DANCING, DOUBLES TENNIS OR THROWING A BASEBALL OR FOOTBALL: YES
CAN YOU DO MODERATE WORK AROUND THE HOUSE LIKE VACUUMING, SWEEPING FLOORS OR CARRYING GROCERIES: YES
TOTAL_SCORE: 32.95
DASI METS SCORE: 6.8
CAN YOU CLIMB A FLIGHT OF STAIRS OR WALK UP A HILL: YES
CAN YOU PARTICIPATE IN STRENOUS SPORTS LIKE SWIMMING, SINGLES TENNIS, FOOTBALL, BASKETBALL, OR SKIING: NO

## 2025-06-17 ASSESSMENT — LIFESTYLE VARIABLES: SMOKING_STATUS: NONSMOKER

## 2025-06-17 ASSESSMENT — ACTIVITIES OF DAILY LIVING (ADL): ADL_SCORE: 0

## 2025-06-17 ASSESSMENT — PAIN SCALES - GENERAL: PAINLEVEL_OUTOF10: 0 - NO PAIN

## 2025-06-17 ASSESSMENT — PAIN - FUNCTIONAL ASSESSMENT: PAIN_FUNCTIONAL_ASSESSMENT: 0-10

## 2025-06-17 NOTE — PREPROCEDURE INSTRUCTIONS
Thank you for visiting Preadmission Testing at [facility]. If you have any changes to your health condition, please call the SURGEON's office to alert them and give them details of your symptoms.  STOP all NSAIDS (Ibuprofen, Motrin, Aleve), vitamins, herbals, supplements, and all over the counter medications for 7 days prior to surgery  Tylenol is okay to take up until the morning of surgery for pain or headache if needed         Preoperative Brain Exercises    What are brain exercises?  A brain exercise is any activity that engages your thinking (cognitive) skills.    What types of activities are considered brain exercises?  Jigsaw puzzles, crossword puzzles, word jumble, memory games, word search, and many more.  Many can be found free online or on your phone via a mobile yossi.    Why should I do brain exercises before my surgery?  More recent research has shown brain exercise before surgery can lower the risk of postoperative delirium (confusion) which can be especially important for older adults.  Patients who did brain exercises for 5 to 10 hours the days before surgery, cut their risk of postoperative delirium in half up to 1 week after surgery.      Preoperative Deep Breathing Exercises    Why it is important to do deep breathing exercises before my surgery?  Deep breathing exercises strengthen your breathing muscles.  This helps you to recover after your surgery and decreases the chance of breathing complications.    How are the deep breathing exercises done?  Sit straight with your back supported.  Breathe in deeply and slowly through your nose. Your lower rib cage should expand and your abdomen may move forward.  Hold that breath for 3 to 5 seconds.  Breathe out through pursed lips, slowly and completely.  Rest and repeat 10 times every hour while awake.  Rest longer if you become dizzy or lightheaded.      Patient and Family Education   Ways You Can Help Prevent Blood Clots     This handout explains some simple  things you can do to help prevent blood clots.      Blood clots are blockages that can form in the body's veins. When a blood clot forms in your deep veins, it may be called a deep vein thrombosis, or DVT for short. Blood clots can happen in any part of the body where blood flows, but they are most common in the arms and legs. If a piece of a blood clot breaks free and travels to the lungs, it is called a pulmonary embolus (PE). A PE can be a very serious problem.      Being in the hospital or having surgery can raise your chances of getting a blood clot because you may not be well enough to move around as much as you normally do.      Ways you can help prevent blood clots in the hospital         Wearing SCDs. SCDs stands for Sequential Compression Devices.   SCDs are special sleeves that wrap around your legs  They attach to a pump that fills them with air to gently squeeze your legs every few minutes.   This helps return the blood in your legs to your heart.   SCDs should only be taken off when walking or bathing.   SCDs may not be comfortable, but they can help save your life.               Wearing compression stockings - if your doctor orders them. These special snug fitting stockings gently squeeze your legs to help blood flow.       Walking. Walking helps move the blood in your legs.   If your doctor says it is ok, try walking the halls at least   5 times a day. Ask us to help you get up, so you don't fall.      Taking any blood thinning medicines your doctor orders.          ©[facility]; 3/23        Ways you can help prevent blood clots at home       Wearing compression stockings - if your doctor orders them. ? Walking - to help move the blood in your legs.       Taking any blood thinning medicines your doctor orders.      Signs of a blood clot or PE      Tell your doctor or nurse know right away if you have of the problems listed below.    If you are at home, seek medical care right away. Call [phone]  for chest pain or problems breathing.          Signs of a blood clot (DVT) - such as pain,  swelling, redness or warmth in your arm or leg      Signs of a pulmonary embolism (PE) - such as chest     pain or feeling short of breath

## 2025-06-17 NOTE — PREPROCEDURE INSTRUCTIONS
Medication List            Accurate as of June 17, 2025 11:23 AM. Always use your most recent med list.                albuterol 90 mcg/actuation inhaler  Medication Adjustments for Surgery: Take/Use as prescribed     amLODIPine 5 mg tablet  Commonly known as: Norvasc  Take 1 tablet (5 mg) by mouth once daily.  Medication Adjustments for Surgery: Take/Use as prescribed     aspirin 81 mg EC tablet  Additional Medication Adjustments for Surgery: Take last dose 5 days before surgery  Notes to patient: Per Dr. Cruz      atenolol 25 mg tablet  Commonly known as: Tenormin  TAKE 1 TABLET BY MOUTH ONCE  DAILY  Medication Adjustments for Surgery: Take/Use as prescribed     atorvastatin 20 mg tablet  Commonly known as: Lipitor  TAKE 1 TABLET BY MOUTH DAILY  Medication Adjustments for Surgery: Take/Use as prescribed     biotin 10 mg tablet  Additional Medication Adjustments for Surgery: Take last dose 7 days before surgery     CALCIUM 600 ORAL  Additional Medication Adjustments for Surgery: Take last dose 7 days before surgery     chlorhexidine 0.12 % solution  Commonly known as: Peridex  15 milliliter(s) orally once a day for 2 doses 15 ml  the night before surgery and 15 ml morning of surgery - swish for 30 seconds -DO NOT SWALLOW, SPIT OUT     cholecalciferol 50 mcg (2,000 units) capsule  Commonly known as: Vitamin D-3  Additional Medication Adjustments for Surgery: Take last dose 7 days before surgery     diazePAM 5 mg tablet  Commonly known as: Valium  Take one tablet ONE HOUR BEFORE THE MRI. Then take another tablet on arrival to MRI facility  Medication Adjustments for Surgery: Take/Use as prescribed     eplerenone 25 mg tablet  Commonly known as: Inspra  Take 1 tablet (25 mg) by mouth once daily.  Medication Adjustments for Surgery: Do Not take on the morning of surgery     estradiol 0.01 % (0.1 mg/gram) vaginal cream  Commonly known as: Estrace  Medication Adjustments for Surgery: Do Not take on the morning of  surgery     gabapentin 300 mg capsule  Commonly known as: Neurontin  TAKE 1 CAPSULE BY MOUTH TWICE  DAILY  Medication Adjustments for Surgery: Take/Use as prescribed     HYDROcodone-acetaminophen 5-325 mg tablet  Commonly known as: Norco  Take 1 tablet by mouth 3 times a day as needed for severe pain (7 - 10).  Medication Adjustments for Surgery: Take/Use as prescribed     hydroxychloroquine 200 mg tablet  Commonly known as: Plaquenil  Additional Medication Adjustments for Surgery: Other (Comment)  Notes to patient: Coordinate with prescribing provider for further instructions on this medications prior to surgery.     Incruse Ellipta 62.5 mcg/actuation inhalation  Generic drug: umeclidinium  Inhale 1 puff (62.5 mcg) once daily.  Medication Adjustments for Surgery: Take/Use as prescribed     lansoprazole 30 mg DR capsule  Commonly known as: Prevacid  TAKE 1 CAPSULE BY MOUTH IN THE  MORNING  Medication Adjustments for Surgery: Take/Use as prescribed     levothyroxine 137 mcg tablet  Commonly known as: Synthroid, Levoxyl  TAKE 1 TABLET BY MOUTH daily  Medication Adjustments for Surgery: Take/Use as prescribed     methocarbamol 500 mg tablet  Commonly known as: Robaxin  Medication Adjustments for Surgery: Take/Use as prescribed     minoxidil 2.5 mg tablet  Commonly known as: Loniten  Take 1 tablet (2.5 mg) by mouth once daily.  Medication Adjustments for Surgery: Take/Use as prescribed     Multivitamin 50 Plus  Generic drug: multivitamin with minerals iron-free  Additional Medication Adjustments for Surgery: Take last dose 7 days before surgery     * nortriptyline 25 mg capsule  Commonly known as: Pamelor  Take 2 capsules (50 mg) by mouth once daily.  Medication Adjustments for Surgery: Take/Use as prescribed     * nortriptyline 25 mg capsule  Commonly known as: Pamelor  TAKE 2 CAPSULES BY MOUTH ONCE  DAILY  Medication Adjustments for Surgery: Take/Use as prescribed     zinc gluconate 50 mg elemental tablet  Additional  Medication Adjustments for Surgery: Take last dose 7 days before surgery     zolpidem 10 mg tablet  Commonly known as: Ambien  Take 1 tablet (10 mg) by mouth once daily at bedtime.  Medication Adjustments for Surgery: Take/Use as prescribed           * This list has 2 medication(s) that are the same as other medications prescribed for you. Read the directions carefully, and ask your doctor or other care provider to review them with you.                      PRE-OPERATIVE INSTRUCTIONS    You will receive notification one business day prior to your procedure to confirm your arrival time. It is important that you answer your phone and/or check your messages during this time. If you do not hear from the surgery center by 5 pm. the day before your procedure, please call [phone].     Please enter the building through the Outpatient entrance and take the elevator off the lobby to the 2nd floor then check in at the Outpatient Surgery desk on the 2nd floor.    INSTRUCTIONS:  Talk to your surgeon for instructions if you should stop your aspirin, blood thinner, or diabetes medicines.  DO NOT take any multivitamins or over the counter supplements for 7-10 days before surgery.  If not being admitted, you must have an adult immediately available to drive you home after surgery. We also highly recommend you have someone stay with you for the entire day and night of your surgery.  For children having surgery, a parent or legal guardian must accompany them to the surgery center. If this is not possible, please call [phone] to make additional arrangements.  For adults who are unable to consent or make medical decisions for themselves, a legal guardian or Power of  must accompany them to the surgery center. If this is not possible, please call [phone] to make additional arrangements.  Wear comfortable, loose fitting clothing.  All jewelry and piercings must be removed. If you are unable to remove an item or have  a dermal piercing, please be sure to tell the nurse when you arrive for surgery.  Nail polish and make-up must be removed.  Avoid smoking or consuming alcohol for 24 hours before surgery.  To help prevent infection, please take a shower/bath and wash your hair the night before and/or morning of surgery (or follow other specific bathing instructions provided).    Preoperative Fasting Guidelines    Why must I stop eating and drinking near surgery time?  With sedation, food or liquid in your stomach can enter your lungs causing serious complications  Increases nausea and vomiting    When do I need to stop eating and drinking before my surgery?  Do not eat any solid food after midnight the night before your surgery/procedure unless otherwise instructed by your surgeon.   If you take a GLP-1 medication (ex: Trulicity, Ozempic, Mounjaro, Zepbound, Bydyreon, Tanzeun, Saxenda, Victoza, Adlyxin, Rybelus) please follow other specific instructions provided regarding preoperative          fasting.  You may have up to 13.5 ounces of clear liquid until TWO hours before your instructed arrival time to the hospital.  This includes water, black tea/coffee, (no milk or cream) apple juice, and electrolyte drinks (Gatorade).   You may chew gum until TWO hours before your surgery/procedure         If applicable, notify your surgeons office immediately of any new skin changes that occur to the surgical limb.      If you have any questions or concerns, please call Pre-Admission Testing at [phone].

## 2025-06-18 ENCOUNTER — HOSPITAL ENCOUNTER (OUTPATIENT)
Dept: CARDIOLOGY | Facility: HOSPITAL | Age: 75
Discharge: HOME | End: 2025-06-18
Payer: MEDICARE

## 2025-06-18 DIAGNOSIS — N39.3 SUI (STRESS URINARY INCONTINENCE, FEMALE): ICD-10-CM

## 2025-06-18 DIAGNOSIS — N81.2 UTEROVAGINAL PROLAPSE, INCOMPLETE: ICD-10-CM

## 2025-06-18 LAB
ATRIAL RATE: 66 BPM
P AXIS: 29 DEGREES
P OFFSET: 170 MS
P ONSET: 120 MS
PR INTERVAL: 200 MS
Q ONSET: 220 MS
QRS COUNT: 11 BEATS
QRS DURATION: 86 MS
QT INTERVAL: 422 MS
QTC CALCULATION(BAZETT): 442 MS
QTC FREDERICIA: 436 MS
R AXIS: 12 DEGREES
T AXIS: 39 DEGREES
T OFFSET: 431 MS
VENTRICULAR RATE: 66 BPM

## 2025-06-18 PROCEDURE — 93005 ELECTROCARDIOGRAM TRACING: CPT

## 2025-06-18 NOTE — PREPROCEDURE INSTRUCTIONS
Current Rx Instructions for Day of Surgery[1]                      PRE-OPERATIVE INSTRUCTIONS    You will receive notification one business day prior to your procedure to confirm your arrival time. It is important that you answer your phone and/or check your messages during this time. If you do not hear from the surgery center by 5 pm. the day before your procedure, please call [phone].     Please enter the building through the Outpatient entrance and take the elevator off the lobby to the 2nd floor then check in at the Outpatient Surgery desk on the 2nd floor.    INSTRUCTIONS:  Talk to your surgeon for instructions if you should stop your aspirin, blood thinner, or diabetes medicines.  DO NOT take any multivitamins or over the counter supplements for 7-10 days before surgery.  If not being admitted, you must have an adult immediately available to drive you home after surgery. We also highly recommend you have someone stay with you for the entire day and night of your surgery.  For children having surgery, a parent or legal guardian must accompany them to the surgery center. If this is not possible, please call [phone] to make additional arrangements.  For adults who are unable to consent or make medical decisions for themselves, a legal guardian or Power of  must accompany them to the surgery center. If this is not possible, please call [phone] to make additional arrangements.  Wear comfortable, loose fitting clothing.  All jewelry and piercings must be removed. If you are unable to remove an item or have a dermal piercing, please be sure to tell the nurse when you arrive for surgery.  Nail polish and make-up must be removed.  Avoid smoking or consuming alcohol for 24 hours before surgery.  To help prevent infection, please take a shower/bath and wash your hair the night before and/or morning of surgery (or follow other specific bathing instructions provided).    Preoperative Fasting  Guidelines    Why must I stop eating and drinking near surgery time?  With sedation, food or liquid in your stomach can enter your lungs causing serious complications  Increases nausea and vomiting    When do I need to stop eating and drinking before my surgery?  Do not eat any solid food after midnight the night before your surgery/procedure unless otherwise instructed by your surgeon.   If you take a GLP-1 medication (ex: Trulicity, Ozempic, Mounjaro, Zepbound, Bydyreon, Tanzeun, Saxenda, Victoza, Adlyxin, Rybelus) please follow other specific instructions provided regarding preoperative          fasting.  You may have up to 13.5 ounces of clear liquid until TWO hours before your instructed arrival time to the hospital.  This includes water, black tea/coffee, (no milk or cream) apple juice, and electrolyte drinks (Gatorade).   You may chew gum until TWO hours before your surgery/procedure         If applicable, notify your surgeons office immediately of any new skin changes that occur to the surgical limb.      If you have any questions or concerns, please call Pre-Admission Testing at [phone].                 Home Preoperative Antibacterial Shower with Chlorhexidine gluconate (CHG)     What is a home preoperative antibacterial shower?  This shower is a way of cleaning the skin with a germ killing solution before surgery. The solution contains chlorhexidine gluconate, commonly known as CHG. CHG is a skin cleanser with germ killing ability. Let your doctor know if you are allergic to chlorhexidine.    Why do I need to take a preoperative antibacterial shower?  Skin is not sterile. It is best to try to make your skin as free of germs as possible before surgery. Proper cleansing with a germ killing soap before surgery can lower the number of germs on your skin. This helps to reduce the risk of infection at the surgical site. Following the instructions listed below will help you prepare your skin for  surgery.    How do I use the solution?  Begin using your CHG soap the night before and again the morning of your procedure.   Do not shave the day before or day of surgery.  Remove all jewelry until after surgery. Take off rings and take out all body-piercing jewelry.  Wash your face and hair with normal soap and shampoo before you use the CHG soap.  Apply the CHG solution to a clean wet washcloth. Move away from the water to avoid premature rinsing of the CHG soap as you are applying. Firmly lather your entire body from the neck down. Do not use CHG on your face, eyes, ears, or genitals.   Pay special attention to the area where your incisions will be located.  Do not scrub your skin too hard.  It is important to allow the CHG soap to sit on your skin for 3-5 minutes.  Rinse the solution off your body completely. Do not wash with your normal soap after the CHG soap solution.  Pat yourself dry with a clean, soft towel.  Do not apply powders, lotions or deodorants as these might block how the CHG soap works.   Dress in clean clothing.  Be sure to sleep with clean, freshly laundered sheets.  Be aware that CHG can cause stains on fabric. Rinse your washcloth and other linens that have contact with CHG completely. Use only non-chlorine detergents to launder the items used.                                   [1]   No outpatient medications have been marked as taking for the 7/1/25 encounter (Hospital Encounter).

## 2025-06-19 LAB — STAPHYLOCOCCUS SPEC CULT: NORMAL

## 2025-06-23 ENCOUNTER — ANESTHESIA EVENT (OUTPATIENT)
Dept: OPERATING ROOM | Facility: HOSPITAL | Age: 75
End: 2025-06-23
Payer: MEDICARE

## 2025-06-24 ENCOUNTER — TELEPHONE (OUTPATIENT)
Dept: CARDIOLOGY | Facility: CLINIC | Age: 75
End: 2025-06-24
Payer: MEDICARE

## 2025-06-24 NOTE — TELEPHONE ENCOUNTER
Pelvic Health Physical Therapy   Treatment Note     Name: Tamara Lake  Clinic Number: [id number]    Therapy Diagnosis:   Encounter Diagnoses   Name Primary?    Pelvic pain Yes    Muscle spasm     Other lack of coordination      Physician: Mary Aguirre MD    Visit Date: 10/21/2020    Physician Orders: PT Eval and Treat   Medical Diagnosis from Referral: perineal pain   Evaluation Date: 9/2/2020  Authorization Period Expiration: 12/31/2020  Plan of Care Expiration: 11-  Visit # / Visits authorized: 4/ 5  Cancelled Visits: 1  No Show Visits: 0    Time In: 1506  Time Out: 1600  Total Billable Time: 56 minutes    Precautions: Standard    Subjective     Pt reports: No issues or pain reported since her last visit.  She has been keeping up with her stretches after her workouts.     She was compliant with home exercise program.  Response to previous treatment: no issues reported  Functional change: no changes reported     Pain: 0/10    Location: Mid back.  Denies pelvic pain today.     Objective     Tamara received therapeutic exercises to develop  ROM and flexibility for 15 minutes including:   Happy baby stretch 3x30 sec  Piriformis stretch 3x30 sec   Child's pose 3x30 sec   Open book stretch 2 sec x 10 reps Sawyer   Prone press ups x 5     Not performed today:  Groin stretch 3x30 sec  Cat-Camel stretch x20 reps   Threading the needle x 10 Sawyer (Pt reports pain with left side)    Tamara received the following manual therapy techniques: to develop flexibility and extensibility for 30 minutes including: soft tissue mobilization of abdominal wall musculature and connective tissue and SCS of layer 1 external and internal Pelvic floor   A folded towel was placed under pt's sacrum to help with calming nerve input into the muscles.       Tamara participated in neuromuscular re-education activities to develop Coordination, Control and Down training for 9 minutes including: pelvic floor relaxation/bulging  Pt called stated she having surgery a week from today. She been having swollen in her left leg and its painful. she want to know is it heart related.    training    Home Exercises Provided and Patient Education Provided     Education provided:   - anatomy/physiology of pelvic floor  Discussed progression of plan of care with patient; educated pt in activity modification; reviewed HEP with pt. Pt demonstrated and verbalized understanding of all instruction and was provided with a handout of HEP (see Patient Instructions).    Written Home Exercises Provided: yes.  Exercises were reviewed and Tamara was able to demonstrate them prior to the end of the session.  Tamara demonstrated good  understanding of the education provided.     See EMR under Patient Instructions for exercises provided 9/30/2020.    Assessment     Continued working on pelvic floor muscle downtraining and coordination training today.  Treatment focused on external SCS to layer 1 musculature.  Increased tightness noted in pelvic floor today and pt reports burning pain with light palpation. Used a towel roll under the sacrum to help decrease the sensatioof burning.   Also worked on coordination of diaphragmatic breathing with pelvic floor relaxation.  Continued hip stretching program to help decrease strain to pelvic structures.      Tamara is progressing well towards her goals.   Pt prognosis is Excellent.     Pt will continue to benefit from skilled outpatient physical therapy to address the deficits listed in the problem list box on initial evaluation, provide pt/family education and to maximize pt's level of independence in the home and community environment.     Pt's spiritual, cultural and educational needs considered and pt agreeable to plan of care and goals.     Anticipated barriers to physical therapy: none    Goals:   Short Term Goals: 12 weeks   Pt to demonstrate proper diaphragmatic breathing to help with calming the nervous system in order to decrease pain and to improve abdominal wall musculature extensibility.   Pt to report a decrease in pain to no more than 5 at it's worst with  vaginal exam.    Pt will report minimal to no pain with single digit pelvic assessment and display relaxation during this assessment in order to progress to dilator use.  Pt will report successful tampon use with < or = 5/10  pain for improved activity tolerance.       Long Term Goals: 4 weeks  Pt to be discharged with home plan for carry over after discharge.   Pt to report being able to have a comfortable vaginal exam without significant increase in pelvic pain.  Pt to report a decrease in pain to no more than 3 at it's worst with vaginal exam.    Pt will report successful tampon use with no pain reported to demonstrate a return towards prior level of function.     Plan     Plan of care Certification: 9/2/2020 to 11-.     Outpatient Physical Therapy 2 times weekly for 12 weeks to include the following interventions: therapeutic exercises, therapeutic activity, neuromuscular re-education, gait training, manual therapy, modalities PRN, patient/family education, dry needling and self care/home management    France Judd, PT

## 2025-06-24 NOTE — TELEPHONE ENCOUNTER
Called pt to discuss. Pt reports she has swelling in left foot: ankle, bone on outer aspect, heel. Pt reports it is painful to touch and walk and the outer part of ankle near bone is slightly reddened today. Pt reports in the morning no issues but as the day progresses swelling and tenderness begins.     Pt was seen on 6/4/25.    Please advise. Thanks.

## 2025-06-25 DIAGNOSIS — M10.9 ACUTE GOUT OF ANKLE, UNSPECIFIED CAUSE, UNSPECIFIED LATERALITY: Primary | ICD-10-CM

## 2025-06-25 RX ORDER — METHYLPREDNISOLONE 4 MG/1
TABLET ORAL
Qty: 21 TABLET | Refills: 0 | Status: SHIPPED | OUTPATIENT
Start: 2025-06-25 | End: 2025-07-02 | Stop reason: HOSPADM

## 2025-07-01 ENCOUNTER — ANESTHESIA (OUTPATIENT)
Dept: OPERATING ROOM | Facility: HOSPITAL | Age: 75
End: 2025-07-01
Payer: MEDICARE

## 2025-07-01 ENCOUNTER — HOSPITAL ENCOUNTER (OUTPATIENT)
Facility: HOSPITAL | Age: 75
Discharge: HOME | End: 2025-07-02
Attending: UROLOGY | Admitting: UROLOGY
Payer: MEDICARE

## 2025-07-01 DIAGNOSIS — N81.2 UTEROVAGINAL PROLAPSE, INCOMPLETE: Primary | ICD-10-CM

## 2025-07-01 DIAGNOSIS — N39.3 SUI (STRESS URINARY INCONTINENCE, FEMALE): ICD-10-CM

## 2025-07-01 PROBLEM — N81.4 UTEROVAGINAL PROLAPSE: Status: ACTIVE | Noted: 2025-07-01

## 2025-07-01 PROCEDURE — 2500000005 HC RX 250 GENERAL PHARMACY W/O HCPCS

## 2025-07-01 PROCEDURE — 2500000004 HC RX 250 GENERAL PHARMACY W/ HCPCS (ALT 636 FOR OP/ED)

## 2025-07-01 PROCEDURE — A57288 PR SLING OPER STRES INCONTINENCE: Performed by: ANESTHESIOLOGY

## 2025-07-01 PROCEDURE — 2500000001 HC RX 250 WO HCPCS SELF ADMINISTERED DRUGS (ALT 637 FOR MEDICARE OP)

## 2025-07-01 PROCEDURE — 57288 REPAIR BLADDER DEFECT: CPT | Performed by: UROLOGY

## 2025-07-01 PROCEDURE — 57250 REPAIR RECTUM & VAGINA: CPT | Performed by: UROLOGY

## 2025-07-01 PROCEDURE — 2500000004 HC RX 250 GENERAL PHARMACY W/ HCPCS (ALT 636 FOR OP/ED): Performed by: ANESTHESIOLOGIST ASSISTANT

## 2025-07-01 PROCEDURE — C1889 IMPLANT/INSERT DEVICE, NOC: HCPCS | Performed by: UROLOGY

## 2025-07-01 PROCEDURE — 2780000003 HC OR 278 NO HCPCS: Performed by: UROLOGY

## 2025-07-01 PROCEDURE — 58571 TLH W/T/O 250 G OR LESS: CPT | Performed by: UROLOGY

## 2025-07-01 PROCEDURE — 3700000002 HC GENERAL ANESTHESIA TIME - EACH INCREMENTAL 1 MINUTE: Performed by: UROLOGY

## 2025-07-01 PROCEDURE — 7100000001 HC RECOVERY ROOM TIME - INITIAL BASE CHARGE: Performed by: UROLOGY

## 2025-07-01 PROCEDURE — 2500000004 HC RX 250 GENERAL PHARMACY W/ HCPCS (ALT 636 FOR OP/ED): Performed by: UROLOGY

## 2025-07-01 PROCEDURE — 2500000005 HC RX 250 GENERAL PHARMACY W/O HCPCS: Performed by: ANESTHESIOLOGY

## 2025-07-01 PROCEDURE — 99100 ANES PT EXTEME AGE<1 YR&>70: CPT | Performed by: ANESTHESIOLOGY

## 2025-07-01 PROCEDURE — 7100000011 HC EXTENDED STAY RECOVERY HOURLY - NURSING UNIT

## 2025-07-01 PROCEDURE — 2500000002 HC RX 250 W HCPCS SELF ADMINISTERED DRUGS (ALT 637 FOR MEDICARE OP, ALT 636 FOR OP/ED)

## 2025-07-01 PROCEDURE — 3700000001 HC GENERAL ANESTHESIA TIME - INITIAL BASE CHARGE: Performed by: UROLOGY

## 2025-07-01 PROCEDURE — 96372 THER/PROPH/DIAG INJ SC/IM: CPT

## 2025-07-01 PROCEDURE — 88305 TISSUE EXAM BY PATHOLOGIST: CPT | Mod: TC,STJLAB | Performed by: UROLOGY

## 2025-07-01 PROCEDURE — 3600000017 HC OR TIME - EACH INCREMENTAL 1 MINUTE - PROCEDURE LEVEL SIX: Performed by: UROLOGY

## 2025-07-01 PROCEDURE — C1788 PORT, INDWELLING, IMP: HCPCS | Performed by: UROLOGY

## 2025-07-01 PROCEDURE — 2500000005 HC RX 250 GENERAL PHARMACY W/O HCPCS: Performed by: ANESTHESIOLOGIST ASSISTANT

## 2025-07-01 PROCEDURE — 7100000002 HC RECOVERY ROOM TIME - EACH INCREMENTAL 1 MINUTE: Performed by: UROLOGY

## 2025-07-01 PROCEDURE — 3600000018 HC OR TIME - INITIAL BASE CHARGE - PROCEDURE LEVEL SIX: Performed by: UROLOGY

## 2025-07-01 PROCEDURE — C1771 REP DEV, URINARY, W/SLING: HCPCS | Performed by: UROLOGY

## 2025-07-01 PROCEDURE — 88305 TISSUE EXAM BY PATHOLOGIST: CPT | Performed by: STUDENT IN AN ORGANIZED HEALTH CARE EDUCATION/TRAINING PROGRAM

## 2025-07-01 PROCEDURE — 2720000007 HC OR 272 NO HCPCS: Performed by: UROLOGY

## 2025-07-01 PROCEDURE — 2500000004 HC RX 250 GENERAL PHARMACY W/ HCPCS (ALT 636 FOR OP/ED): Mod: JZ | Performed by: ANESTHESIOLOGY

## 2025-07-01 PROCEDURE — A57288 PR SLING OPER STRES INCONTINENCE: Performed by: ANESTHESIOLOGIST ASSISTANT

## 2025-07-01 DEVICE — SUPRAPUBIC MID-URETHRAL SLING
Type: IMPLANTABLE DEVICE | Site: PELVIS | Status: FUNCTIONAL
Brand: LYNX™ SYSTEM

## 2025-07-01 RX ORDER — LABETALOL HYDROCHLORIDE 5 MG/ML
5 INJECTION, SOLUTION INTRAVENOUS ONCE AS NEEDED
Status: DISCONTINUED | OUTPATIENT
Start: 2025-07-01 | End: 2025-07-01 | Stop reason: HOSPADM

## 2025-07-01 RX ORDER — NALOXONE HYDROCHLORIDE 0.4 MG/ML
0.2 INJECTION, SOLUTION INTRAMUSCULAR; INTRAVENOUS; SUBCUTANEOUS EVERY 5 MIN PRN
Status: DISCONTINUED | OUTPATIENT
Start: 2025-07-01 | End: 2025-07-02 | Stop reason: HOSPADM

## 2025-07-01 RX ORDER — FAMOTIDINE 10 MG/ML
20 INJECTION, SOLUTION INTRAVENOUS ONCE
Status: DISCONTINUED | OUTPATIENT
Start: 2025-07-01 | End: 2025-07-01 | Stop reason: HOSPADM

## 2025-07-01 RX ORDER — DIPHENHYDRAMINE HYDROCHLORIDE 50 MG/ML
12.5 INJECTION, SOLUTION INTRAMUSCULAR; INTRAVENOUS ONCE AS NEEDED
Status: DISCONTINUED | OUTPATIENT
Start: 2025-07-01 | End: 2025-07-01 | Stop reason: HOSPADM

## 2025-07-01 RX ORDER — METOCLOPRAMIDE HYDROCHLORIDE 5 MG/ML
10 INJECTION INTRAMUSCULAR; INTRAVENOUS ONCE AS NEEDED
Status: COMPLETED | OUTPATIENT
Start: 2025-07-01 | End: 2025-07-01

## 2025-07-01 RX ORDER — OXYCODONE HYDROCHLORIDE 5 MG/1
5 TABLET ORAL EVERY 4 HOURS PRN
Status: DISCONTINUED | OUTPATIENT
Start: 2025-07-01 | End: 2025-07-02 | Stop reason: HOSPADM

## 2025-07-01 RX ORDER — LIDOCAINE HYDROCHLORIDE AND EPINEPHRINE 10; 10 UG/ML; MG/ML
INJECTION, SOLUTION INFILTRATION; PERINEURAL AS NEEDED
Status: DISCONTINUED | OUTPATIENT
Start: 2025-07-01 | End: 2025-07-01 | Stop reason: HOSPADM

## 2025-07-01 RX ORDER — ONDANSETRON HYDROCHLORIDE 2 MG/ML
INJECTION, SOLUTION INTRAVENOUS AS NEEDED
Status: DISCONTINUED | OUTPATIENT
Start: 2025-07-01 | End: 2025-07-01

## 2025-07-01 RX ORDER — HYDROMORPHONE HYDROCHLORIDE 1 MG/ML
INJECTION, SOLUTION INTRAMUSCULAR; INTRAVENOUS; SUBCUTANEOUS AS NEEDED
Status: DISCONTINUED | OUTPATIENT
Start: 2025-07-01 | End: 2025-07-01

## 2025-07-01 RX ORDER — LIDOCAINE HYDROCHLORIDE 10 MG/ML
0.1 INJECTION, SOLUTION INFILTRATION; PERINEURAL ONCE
Status: DISCONTINUED | OUTPATIENT
Start: 2025-07-01 | End: 2025-07-01 | Stop reason: HOSPADM

## 2025-07-01 RX ORDER — ATORVASTATIN CALCIUM 20 MG/1
20 TABLET, FILM COATED ORAL DAILY
Status: DISCONTINUED | OUTPATIENT
Start: 2025-07-01 | End: 2025-07-02 | Stop reason: HOSPADM

## 2025-07-01 RX ORDER — ZOLPIDEM TARTRATE 5 MG/1
10 TABLET ORAL NIGHTLY
Status: DISCONTINUED | OUTPATIENT
Start: 2025-07-01 | End: 2025-07-02 | Stop reason: HOSPADM

## 2025-07-01 RX ORDER — AMLODIPINE BESYLATE 5 MG/1
5 TABLET ORAL DAILY
Status: DISCONTINUED | OUTPATIENT
Start: 2025-07-01 | End: 2025-07-02 | Stop reason: HOSPADM

## 2025-07-01 RX ORDER — PROPOFOL 10 MG/ML
INJECTION, EMULSION INTRAVENOUS AS NEEDED
Status: DISCONTINUED | OUTPATIENT
Start: 2025-07-01 | End: 2025-07-01

## 2025-07-01 RX ORDER — HYDRALAZINE HYDROCHLORIDE 20 MG/ML
5 INJECTION INTRAMUSCULAR; INTRAVENOUS EVERY 30 MIN PRN
Status: DISCONTINUED | OUTPATIENT
Start: 2025-07-01 | End: 2025-07-01 | Stop reason: HOSPADM

## 2025-07-01 RX ORDER — ONDANSETRON 4 MG/1
4 TABLET, FILM COATED ORAL EVERY 8 HOURS PRN
Status: DISCONTINUED | OUTPATIENT
Start: 2025-07-01 | End: 2025-07-02 | Stop reason: HOSPADM

## 2025-07-01 RX ORDER — HEPARIN SODIUM 5000 [USP'U]/ML
5000 INJECTION, SOLUTION INTRAVENOUS; SUBCUTANEOUS EVERY 8 HOURS
Status: DISCONTINUED | OUTPATIENT
Start: 2025-07-01 | End: 2025-07-02 | Stop reason: HOSPADM

## 2025-07-01 RX ORDER — POLYETHYLENE GLYCOL 3350 17 G/17G
17 POWDER, FOR SOLUTION ORAL DAILY
Status: DISCONTINUED | OUTPATIENT
Start: 2025-07-01 | End: 2025-07-02 | Stop reason: HOSPADM

## 2025-07-01 RX ORDER — LEVOTHYROXINE SODIUM 137 UG/1
137 TABLET ORAL DAILY
Status: DISCONTINUED | OUTPATIENT
Start: 2025-07-01 | End: 2025-07-02 | Stop reason: HOSPADM

## 2025-07-01 RX ORDER — ACETAMINOPHEN 325 MG/1
975 TABLET ORAL EVERY 6 HOURS
Status: DISCONTINUED | OUTPATIENT
Start: 2025-07-01 | End: 2025-07-02 | Stop reason: HOSPADM

## 2025-07-01 RX ORDER — ASPIRIN 81 MG/1
81 TABLET ORAL DAILY
Status: DISCONTINUED | OUTPATIENT
Start: 2025-07-01 | End: 2025-07-02 | Stop reason: HOSPADM

## 2025-07-01 RX ORDER — HYDROMORPHONE HYDROCHLORIDE 0.2 MG/ML
0.2 INJECTION INTRAMUSCULAR; INTRAVENOUS; SUBCUTANEOUS EVERY 5 MIN PRN
Status: DISCONTINUED | OUTPATIENT
Start: 2025-07-01 | End: 2025-07-01 | Stop reason: HOSPADM

## 2025-07-01 RX ORDER — OXYCODONE HYDROCHLORIDE 5 MG/1
5 TABLET ORAL EVERY 4 HOURS PRN
Status: DISCONTINUED | OUTPATIENT
Start: 2025-07-01 | End: 2025-07-01 | Stop reason: HOSPADM

## 2025-07-01 RX ORDER — ESTRADIOL 0.1 MG/G
2 CREAM VAGINAL 2 TIMES DAILY
Status: DISCONTINUED | OUTPATIENT
Start: 2025-07-01 | End: 2025-07-02 | Stop reason: HOSPADM

## 2025-07-01 RX ORDER — LIDOCAINE HYDROCHLORIDE 20 MG/ML
INJECTION, SOLUTION EPIDURAL; INFILTRATION; INTRACAUDAL; PERINEURAL AS NEEDED
Status: DISCONTINUED | OUTPATIENT
Start: 2025-07-01 | End: 2025-07-01

## 2025-07-01 RX ORDER — ONDANSETRON HYDROCHLORIDE 2 MG/ML
4 INJECTION, SOLUTION INTRAVENOUS ONCE AS NEEDED
Status: DISCONTINUED | OUTPATIENT
Start: 2025-07-01 | End: 2025-07-01 | Stop reason: HOSPADM

## 2025-07-01 RX ORDER — BUPIVACAINE HYDROCHLORIDE 5 MG/ML
INJECTION, SOLUTION PERINEURAL AS NEEDED
Status: DISCONTINUED | OUTPATIENT
Start: 2025-07-01 | End: 2025-07-01 | Stop reason: HOSPADM

## 2025-07-01 RX ORDER — NEOSTIGMINE METHYLSULFATE 1 MG/ML
INJECTION INTRAVENOUS AS NEEDED
Status: DISCONTINUED | OUTPATIENT
Start: 2025-07-01 | End: 2025-07-01

## 2025-07-01 RX ORDER — HYDROXYCHLOROQUINE SULFATE 200 MG/1
400 TABLET, FILM COATED ORAL DAILY
Status: DISCONTINUED | OUTPATIENT
Start: 2025-07-01 | End: 2025-07-02 | Stop reason: HOSPADM

## 2025-07-01 RX ORDER — ACETAMINOPHEN 325 MG/1
975 TABLET ORAL ONCE
Status: DISCONTINUED | OUTPATIENT
Start: 2025-07-01 | End: 2025-07-01 | Stop reason: HOSPADM

## 2025-07-01 RX ORDER — CEFAZOLIN SODIUM 2 G/100ML
2 INJECTION, SOLUTION INTRAVENOUS ONCE
Status: COMPLETED | OUTPATIENT
Start: 2025-07-01 | End: 2025-07-01

## 2025-07-01 RX ORDER — SODIUM CHLORIDE, SODIUM LACTATE, POTASSIUM CHLORIDE, CALCIUM CHLORIDE 600; 310; 30; 20 MG/100ML; MG/100ML; MG/100ML; MG/100ML
100 INJECTION, SOLUTION INTRAVENOUS CONTINUOUS
Status: ACTIVE | OUTPATIENT
Start: 2025-07-01 | End: 2025-07-02

## 2025-07-01 RX ORDER — ALBUTEROL SULFATE 0.83 MG/ML
2.5 SOLUTION RESPIRATORY (INHALATION) ONCE AS NEEDED
Status: DISCONTINUED | OUTPATIENT
Start: 2025-07-01 | End: 2025-07-01 | Stop reason: HOSPADM

## 2025-07-01 RX ORDER — ALBUTEROL SULFATE 0.83 MG/ML
3 SOLUTION RESPIRATORY (INHALATION) EVERY 6 HOURS PRN
Status: DISCONTINUED | OUTPATIENT
Start: 2025-07-01 | End: 2025-07-02 | Stop reason: HOSPADM

## 2025-07-01 RX ORDER — ONDANSETRON HYDROCHLORIDE 2 MG/ML
4 INJECTION, SOLUTION INTRAVENOUS EVERY 8 HOURS PRN
Status: DISCONTINUED | OUTPATIENT
Start: 2025-07-01 | End: 2025-07-02 | Stop reason: HOSPADM

## 2025-07-01 RX ORDER — GLYCOPYRROLATE 0.2 MG/ML
INJECTION INTRAMUSCULAR; INTRAVENOUS AS NEEDED
Status: DISCONTINUED | OUTPATIENT
Start: 2025-07-01 | End: 2025-07-01

## 2025-07-01 RX ORDER — ATENOLOL 25 MG/1
25 TABLET ORAL DAILY
Status: DISCONTINUED | OUTPATIENT
Start: 2025-07-02 | End: 2025-07-02 | Stop reason: HOSPADM

## 2025-07-01 RX ORDER — HYDROMORPHONE HYDROCHLORIDE 0.2 MG/ML
0.2 INJECTION INTRAMUSCULAR; INTRAVENOUS; SUBCUTANEOUS
Status: DISCONTINUED | OUTPATIENT
Start: 2025-07-01 | End: 2025-07-02 | Stop reason: HOSPADM

## 2025-07-01 RX ORDER — ROCURONIUM BROMIDE 50 MG/5 ML
SYRINGE (ML) INTRAVENOUS AS NEEDED
Status: DISCONTINUED | OUTPATIENT
Start: 2025-07-01 | End: 2025-07-01

## 2025-07-01 RX ORDER — FENTANYL CITRATE 50 UG/ML
INJECTION, SOLUTION INTRAMUSCULAR; INTRAVENOUS AS NEEDED
Status: DISCONTINUED | OUTPATIENT
Start: 2025-07-01 | End: 2025-07-01

## 2025-07-01 RX ORDER — OXYCODONE HYDROCHLORIDE 10 MG/1
10 TABLET ORAL EVERY 6 HOURS PRN
Status: DISCONTINUED | OUTPATIENT
Start: 2025-07-01 | End: 2025-07-02 | Stop reason: HOSPADM

## 2025-07-01 RX ORDER — OXYCODONE AND ACETAMINOPHEN 5; 325 MG/1; MG/1
1 TABLET ORAL EVERY 4 HOURS PRN
Status: DISCONTINUED | OUTPATIENT
Start: 2025-07-01 | End: 2025-07-01 | Stop reason: HOSPADM

## 2025-07-01 RX ADMIN — ONDANSETRON 4 MG: 2 INJECTION INTRAMUSCULAR; INTRAVENOUS at 11:17

## 2025-07-01 RX ADMIN — PROPOFOL 120 MG: 10 INJECTION, EMULSION INTRAVENOUS at 08:05

## 2025-07-01 RX ADMIN — OXYCODONE HYDROCHLORIDE 5 MG: 5 TABLET ORAL at 16:51

## 2025-07-01 RX ADMIN — DEXAMETHASONE SODIUM PHOSPHATE 4 MG: 4 INJECTION, SOLUTION INTRAMUSCULAR; INTRAVENOUS at 08:29

## 2025-07-01 RX ADMIN — HYDROMORPHONE HYDROCHLORIDE 0.5 MG: 1 INJECTION, SOLUTION INTRAMUSCULAR; INTRAVENOUS; SUBCUTANEOUS at 14:12

## 2025-07-01 RX ADMIN — ZOLPIDEM TARTRATE 10 MG: 5 TABLET, FILM COATED ORAL at 22:28

## 2025-07-01 RX ADMIN — FENTANYL CITRATE 50 MCG: 50 INJECTION, SOLUTION INTRAMUSCULAR; INTRAVENOUS at 08:05

## 2025-07-01 RX ADMIN — PROPOFOL 30 MG: 10 INJECTION, EMULSION INTRAVENOUS at 08:42

## 2025-07-01 RX ADMIN — Medication 6 L/MIN: at 12:21

## 2025-07-01 RX ADMIN — Medication 20 MG: at 08:40

## 2025-07-01 RX ADMIN — HEPARIN SODIUM 5000 UNITS: 5000 INJECTION, SOLUTION INTRAVENOUS; SUBCUTANEOUS at 16:51

## 2025-07-01 RX ADMIN — PROPOFOL 30 MG: 10 INJECTION, EMULSION INTRAVENOUS at 08:31

## 2025-07-01 RX ADMIN — Medication 20 MG: at 09:27

## 2025-07-01 RX ADMIN — ASPIRIN 81 MG: 81 TABLET, COATED ORAL at 16:48

## 2025-07-01 RX ADMIN — HEPARIN SODIUM 5000 UNITS: 5000 INJECTION, SOLUTION INTRAVENOUS; SUBCUTANEOUS at 22:28

## 2025-07-01 RX ADMIN — HYDROMORPHONE HYDROCHLORIDE 0.5 MG: 1 INJECTION, SOLUTION INTRAMUSCULAR; INTRAVENOUS; SUBCUTANEOUS at 13:50

## 2025-07-01 RX ADMIN — METOCLOPRAMIDE 10 MG: 5 INJECTION, SOLUTION INTRAMUSCULAR; INTRAVENOUS at 13:50

## 2025-07-01 RX ADMIN — ESTRADIOL 2 G: 0.1 CREAM VAGINAL at 20:22

## 2025-07-01 RX ADMIN — NEOSTIGMINE METHYLSULFATE 5 MG: 1 INJECTION INTRAVENOUS at 11:41

## 2025-07-01 RX ADMIN — GLYCOPYRROLATE 0.8 MG: 0.2 INJECTION, SOLUTION INTRAMUSCULAR; INTRAVENOUS at 11:41

## 2025-07-01 RX ADMIN — HYDROMORPHONE HYDROCHLORIDE 0.5 MG: 1 INJECTION, SOLUTION INTRAMUSCULAR; INTRAVENOUS; SUBCUTANEOUS at 12:45

## 2025-07-01 RX ADMIN — Medication 10 MG: at 09:58

## 2025-07-01 RX ADMIN — ATORVASTATIN CALCIUM 20 MG: 20 TABLET, FILM COATED ORAL at 16:48

## 2025-07-01 RX ADMIN — HYDROMORPHONE HYDROCHLORIDE 0.5 MG: 1 INJECTION, SOLUTION INTRAMUSCULAR; INTRAVENOUS; SUBCUTANEOUS at 13:09

## 2025-07-01 RX ADMIN — ACETAMINOPHEN 975 MG: 325 TABLET ORAL at 20:22

## 2025-07-01 RX ADMIN — ACETAMINOPHEN 975 MG: 325 TABLET ORAL at 16:48

## 2025-07-01 RX ADMIN — FENTANYL CITRATE 50 MCG: 50 INJECTION, SOLUTION INTRAMUSCULAR; INTRAVENOUS at 08:51

## 2025-07-01 RX ADMIN — SODIUM CHLORIDE, POTASSIUM CHLORIDE, SODIUM LACTATE AND CALCIUM CHLORIDE: 600; 310; 30; 20 INJECTION, SOLUTION INTRAVENOUS at 08:01

## 2025-07-01 RX ADMIN — GLYCOPYRROLATE 0.2 MG: 0.2 INJECTION, SOLUTION INTRAMUSCULAR; INTRAVENOUS at 09:01

## 2025-07-01 RX ADMIN — HYDROMORPHONE HYDROCHLORIDE 0.5 MG: 1 INJECTION, SOLUTION INTRAMUSCULAR; INTRAVENOUS; SUBCUTANEOUS at 10:22

## 2025-07-01 RX ADMIN — SODIUM CHLORIDE, SODIUM LACTATE, POTASSIUM CHLORIDE, AND CALCIUM CHLORIDE 100 ML/HR: .6; .31; .03; .02 INJECTION, SOLUTION INTRAVENOUS at 14:24

## 2025-07-01 RX ADMIN — CEFAZOLIN SODIUM 2 G: 2 INJECTION, SOLUTION INTRAVENOUS at 08:14

## 2025-07-01 RX ADMIN — Medication 50 MG: at 08:05

## 2025-07-01 RX ADMIN — HYDROXYCHLOROQUINE SULFATE 400 MG: 200 TABLET, FILM COATED ORAL at 16:56

## 2025-07-01 RX ADMIN — LIDOCAINE HYDROCHLORIDE 80 MG: 20 INJECTION, SOLUTION EPIDURAL; INFILTRATION; INTRACAUDAL; PERINEURAL at 08:05

## 2025-07-01 RX ADMIN — PROPOFOL 20 MG: 10 INJECTION, EMULSION INTRAVENOUS at 08:47

## 2025-07-01 SDOH — SOCIAL STABILITY: SOCIAL INSECURITY: WITHIN THE LAST YEAR, HAVE YOU BEEN AFRAID OF YOUR PARTNER OR EX-PARTNER?: NO

## 2025-07-01 SDOH — SOCIAL STABILITY: SOCIAL INSECURITY
WITHIN THE LAST YEAR, HAVE YOU BEEN RAPED OR FORCED TO HAVE ANY KIND OF SEXUAL ACTIVITY BY YOUR PARTNER OR EX-PARTNER?: NO

## 2025-07-01 SDOH — SOCIAL STABILITY: SOCIAL INSECURITY: ARE THERE ANY APPARENT SIGNS OF INJURIES/BEHAVIORS THAT COULD BE RELATED TO ABUSE/NEGLECT?: NO

## 2025-07-01 SDOH — SOCIAL STABILITY: SOCIAL INSECURITY
WITHIN THE LAST YEAR, HAVE YOU BEEN KICKED, HIT, SLAPPED, OR OTHERWISE PHYSICALLY HURT BY YOUR PARTNER OR EX-PARTNER?: NO

## 2025-07-01 SDOH — SOCIAL STABILITY: SOCIAL INSECURITY: HAVE YOU HAD ANY THOUGHTS OF HARMING ANYONE ELSE?: NO

## 2025-07-01 SDOH — SOCIAL STABILITY: SOCIAL INSECURITY: DO YOU FEEL ANYONE HAS EXPLOITED OR TAKEN ADVANTAGE OF YOU FINANCIALLY OR OF YOUR PERSONAL PROPERTY?: NO

## 2025-07-01 SDOH — ECONOMIC STABILITY: FOOD INSECURITY: WITHIN THE PAST 12 MONTHS, THE FOOD YOU BOUGHT JUST DIDN'T LAST AND YOU DIDN'T HAVE MONEY TO GET MORE.: NEVER TRUE

## 2025-07-01 SDOH — ECONOMIC STABILITY: INCOME INSECURITY: IN THE PAST 12 MONTHS HAS THE ELECTRIC, GAS, OIL, OR WATER COMPANY THREATENED TO SHUT OFF SERVICES IN YOUR HOME?: NO

## 2025-07-01 SDOH — SOCIAL STABILITY: SOCIAL NETWORK: HOW OFTEN DO YOU ATTEND CHURCH OR RELIGIOUS SERVICES?: MORE THAN 4 TIMES PER YEAR

## 2025-07-01 SDOH — SOCIAL STABILITY: SOCIAL INSECURITY: WITHIN THE LAST YEAR, HAVE YOU BEEN HUMILIATED OR EMOTIONALLY ABUSED IN OTHER WAYS BY YOUR PARTNER OR EX-PARTNER?: NO

## 2025-07-01 SDOH — SOCIAL STABILITY: SOCIAL NETWORK
DO YOU BELONG TO ANY CLUBS OR ORGANIZATIONS SUCH AS CHURCH GROUPS, UNIONS, FRATERNAL OR ATHLETIC GROUPS, OR SCHOOL GROUPS?: NO

## 2025-07-01 SDOH — ECONOMIC STABILITY: HOUSING INSECURITY: DO YOU FEEL UNSAFE GOING BACK TO THE PLACE WHERE YOU LIVE?: NO

## 2025-07-01 SDOH — ECONOMIC STABILITY: FOOD INSECURITY: WITHIN THE PAST 12 MONTHS, YOU WORRIED THAT YOUR FOOD WOULD RUN OUT BEFORE YOU GOT THE MONEY TO BUY MORE.: NEVER TRUE

## 2025-07-01 SDOH — SOCIAL STABILITY: SOCIAL NETWORK: IN A TYPICAL WEEK, HOW MANY TIMES DO YOU TALK ON THE PHONE WITH FAMILY, FRIENDS, OR NEIGHBORS?: NEVER

## 2025-07-01 SDOH — SOCIAL STABILITY: SOCIAL INSECURITY: HAS ANYONE EVER THREATENED TO HURT YOUR FAMILY OR YOUR PETS?: NO

## 2025-07-01 SDOH — HEALTH STABILITY: PHYSICAL HEALTH
HOW OFTEN DO YOU NEED TO HAVE SOMEONE HELP YOU WHEN YOU READ INSTRUCTIONS, PAMPHLETS, OR OTHER WRITTEN MATERIAL FROM YOUR DOCTOR OR PHARMACY?: NEVER

## 2025-07-01 SDOH — SOCIAL STABILITY: SOCIAL INSECURITY: ARE YOU MARRIED, WIDOWED, DIVORCED, SEPARATED, NEVER MARRIED, OR LIVING WITH A PARTNER?: MARRIED

## 2025-07-01 SDOH — SOCIAL STABILITY: SOCIAL NETWORK: HOW OFTEN DO YOU GET TOGETHER WITH FRIENDS OR RELATIVES?: ONCE A WEEK

## 2025-07-01 SDOH — HEALTH STABILITY: MENTAL HEALTH: CURRENT SMOKER: 0

## 2025-07-01 SDOH — SOCIAL STABILITY: SOCIAL INSECURITY: DOES ANYONE TRY TO KEEP YOU FROM HAVING/CONTACTING OTHER FRIENDS OR DOING THINGS OUTSIDE YOUR HOME?: NO

## 2025-07-01 SDOH — SOCIAL STABILITY: SOCIAL INSECURITY: ARE YOU OR HAVE YOU BEEN THREATENED OR ABUSED PHYSICALLY, EMOTIONALLY, OR SEXUALLY BY ANYONE?: NO

## 2025-07-01 SDOH — SOCIAL STABILITY: SOCIAL NETWORK: HOW OFTEN DO YOU ATTEND MEETINGS OF THE CLUBS OR ORGANIZATIONS YOU BELONG TO?: NEVER

## 2025-07-01 SDOH — SOCIAL STABILITY: SOCIAL INSECURITY: DO YOU FEEL UNSAFE GOING BACK TO THE PLACE WHERE YOU ARE LIVING?: NO

## 2025-07-01 SDOH — SOCIAL STABILITY: SOCIAL INSECURITY: ABUSE: ADULT

## 2025-07-01 SDOH — SOCIAL STABILITY: SOCIAL INSECURITY: HAVE YOU HAD THOUGHTS OF HARMING ANYONE ELSE?: NO

## 2025-07-01 ASSESSMENT — ACTIVITIES OF DAILY LIVING (ADL)
PATIENT'S MEMORY ADEQUATE TO SAFELY COMPLETE DAILY ACTIVITIES?: YES
HEARING - LEFT EAR: FUNCTIONAL
ASSISTIVE_DEVICE: EYEGLASSES
LACK_OF_TRANSPORTATION: NO
GROOMING: INDEPENDENT
WALKS IN HOME: NEEDS ASSISTANCE
TOILETING: NEEDS ASSISTANCE
ADEQUATE_TO_COMPLETE_ADL: YES
DRESSING YOURSELF: INDEPENDENT
FEEDING YOURSELF: INDEPENDENT
LACK_OF_TRANSPORTATION: NO
HEARING - RIGHT EAR: FUNCTIONAL
BATHING: INDEPENDENT
JUDGMENT_ADEQUATE_SAFELY_COMPLETE_DAILY_ACTIVITIES: YES

## 2025-07-01 ASSESSMENT — PAIN DESCRIPTION - LOCATION: LOCATION: ABDOMEN

## 2025-07-01 ASSESSMENT — COGNITIVE AND FUNCTIONAL STATUS - GENERAL
MOBILITY SCORE: 18
MOVING FROM LYING ON BACK TO SITTING ON SIDE OF FLAT BED WITH BEDRAILS: A LITTLE
MOBILITY SCORE: 18
CLIMB 3 TO 5 STEPS WITH RAILING: A LITTLE
PATIENT BASELINE BEDBOUND: NO
STANDING UP FROM CHAIR USING ARMS: A LITTLE
DAILY ACTIVITIY SCORE: 21
CLIMB 3 TO 5 STEPS WITH RAILING: A LITTLE
DRESSING REGULAR LOWER BODY CLOTHING: A LITTLE
MOVING TO AND FROM BED TO CHAIR: A LITTLE
TOILETING: A LITTLE
HELP NEEDED FOR BATHING: A LITTLE
TURNING FROM BACK TO SIDE WHILE IN FLAT BAD: A LITTLE
MOVING FROM LYING ON BACK TO SITTING ON SIDE OF FLAT BED WITH BEDRAILS: A LITTLE
DRESSING REGULAR LOWER BODY CLOTHING: A LITTLE
MOVING TO AND FROM BED TO CHAIR: A LITTLE
HELP NEEDED FOR BATHING: A LITTLE
TURNING FROM BACK TO SIDE WHILE IN FLAT BAD: A LITTLE
DAILY ACTIVITIY SCORE: 21
WALKING IN HOSPITAL ROOM: A LITTLE
STANDING UP FROM CHAIR USING ARMS: A LITTLE
TOILETING: A LITTLE
WALKING IN HOSPITAL ROOM: A LITTLE

## 2025-07-01 ASSESSMENT — LIFESTYLE VARIABLES
HOW OFTEN DO YOU HAVE 6 OR MORE DRINKS ON ONE OCCASION: NEVER
SUBSTANCE_ABUSE_PAST_12_MONTHS: NO
HOW MANY STANDARD DRINKS CONTAINING ALCOHOL DO YOU HAVE ON A TYPICAL DAY: PATIENT DOES NOT DRINK
AUDIT-C TOTAL SCORE: 0
SKIP TO QUESTIONS 9-10: 1
PRESCIPTION_ABUSE_PAST_12_MONTHS: NO
HOW OFTEN DO YOU HAVE A DRINK CONTAINING ALCOHOL: NEVER
AUDIT-C TOTAL SCORE: 0

## 2025-07-01 ASSESSMENT — PAIN SCALES - GENERAL
PAINLEVEL_OUTOF10: 7
PAINLEVEL_OUTOF10: 2
PAINLEVEL_OUTOF10: 8
PAINLEVEL_OUTOF10: 6
PAINLEVEL_OUTOF10: 5 - MODERATE PAIN
PAINLEVEL_OUTOF10: 6
PAINLEVEL_OUTOF10: 0 - NO PAIN
PAINLEVEL_OUTOF10: 8
PAINLEVEL_OUTOF10: 7
PAINLEVEL_OUTOF10: 8
PAINLEVEL_OUTOF10: 7

## 2025-07-01 ASSESSMENT — PAIN DESCRIPTION - DESCRIPTORS
DESCRIPTORS: ACHING
DESCRIPTORS: PRESSURE
DESCRIPTORS: ACHING
DESCRIPTORS: PRESSURE

## 2025-07-01 ASSESSMENT — PATIENT HEALTH QUESTIONNAIRE - PHQ9
1. LITTLE INTEREST OR PLEASURE IN DOING THINGS: NOT AT ALL
SUM OF ALL RESPONSES TO PHQ9 QUESTIONS 1 & 2: 0
2. FEELING DOWN, DEPRESSED OR HOPELESS: NOT AT ALL

## 2025-07-01 ASSESSMENT — PAIN - FUNCTIONAL ASSESSMENT
PAIN_FUNCTIONAL_ASSESSMENT: 0-10
PAIN_FUNCTIONAL_ASSESSMENT: UNABLE TO SELF-REPORT
PAIN_FUNCTIONAL_ASSESSMENT: 0-10
PAIN_FUNCTIONAL_ASSESSMENT: UNABLE TO SELF-REPORT

## 2025-07-01 ASSESSMENT — COLUMBIA-SUICIDE SEVERITY RATING SCALE - C-SSRS
2. HAVE YOU ACTUALLY HAD ANY THOUGHTS OF KILLING YOURSELF?: NO
1. IN THE PAST MONTH, HAVE YOU WISHED YOU WERE DEAD OR WISHED YOU COULD GO TO SLEEP AND NOT WAKE UP?: NO
6. HAVE YOU EVER DONE ANYTHING, STARTED TO DO ANYTHING, OR PREPARED TO DO ANYTHING TO END YOUR LIFE?: NO

## 2025-07-01 ASSESSMENT — PAIN DESCRIPTION - ORIENTATION: ORIENTATION: ANTERIOR

## 2025-07-01 NOTE — ANESTHESIA PREPROCEDURE EVALUATION
Patient: Galina Chao    Procedure Information       Date/Time: 07/01/25 0845    Procedure: HYSTERECTOMY, TOTAL, ROBOT-ASSISTED, LAPAROSCOPIC, UTEROSACRAL SUSPENSION, POSTERIOR REPAIR, SLING AND CYSTOSCOPY    Location: [facility] OR 08 / [facility]    Surgeons: Alfonzo Gore MD            Relevant Problems   Cardiac   (+) Coronary artery disease   (+) Essential hypertension   (+) Hyperlipidemia   (+) Presence of stent in coronary artery      Pulmonary   (+) Dyspnea on exertion   (+) Moderate COPD (chronic obstructive pulmonary disease) (Multi)      Neuro   (+) Cervical radiculopathy   (+) Ulnar neuropathy      GI   (+) GERD (gastroesophageal reflux disease)      Endocrine   (+) Class 1 obesity with body mass index (BMI) of 30.0 to 30.9 in adult   (+) Hypothyroidism      Musculoskeletal   (+) Chronic right-sided low back pain without sciatica   (+) Degenerative cervical spinal stenosis   (+) Localized osteoarthritis of hand   (+) Myelopathy concurrent with and due to spinal stenosis of cervical region      HEENT   (+) Bilateral high frequency sensorineural hearing loss      ID   (+) Disease due to severe acute respiratory syndrome coronavirus 2 (SARS-CoV-2)      Skin   (+) Lichen planus       Clinical information reviewed:   Tobacco  Allergies  Meds   Med Hx  Surg Hx   Fam Hx  Soc Hx        NPO Detail:  NPO/Void Status  Carbohydrate Drink Given Prior to Surgery? : N  Date of Last Liquid: 06/30/25  Time of Last Liquid: 2200  Date of Last Solid: 06/30/25  Time of Last Solid: 1800  Last Intake Type: Clear fluids  Time of Last Void: 0739         Physical Exam    Airway  Mallampati: II  TM distance: >3 FB  Neck ROM: full     Cardiovascular   Rhythm: regular     Dental - normal exam     Pulmonary Breath sounds clear to auscultation     Abdominal            Anesthesia Plan    History of general anesthesia?: yes  History of complications of general anesthesia?: no    ASA 2     general     The patient is not a current  smoker.    intravenous induction   Anesthetic plan and risks discussed with patient.    Plan discussed with CAA.

## 2025-07-01 NOTE — ANESTHESIA PROCEDURE NOTES
Peripheral IV  Date/Time: 7/1/2025 8:12 AM  Inserted by: JOSE G Davis    Placement  Needle size: 20 G  Laterality: left  Location: wrist  Site prep: alcohol

## 2025-07-01 NOTE — CARE PLAN
The patient's goals for the shift include Pain control    The clinical goals for the shift include Pain control    Problem: Pain - Adult  Goal: Verbalizes/displays adequate comfort level or baseline comfort level  Outcome: Progressing     Problem: Safety - Adult  Goal: Free from fall injury  Outcome: Progressing     Problem: Discharge Planning  Goal: Discharge to home or other facility with appropriate resources  Outcome: Progressing     Problem: Chronic Conditions and Co-morbidities  Goal: Patient's chronic conditions and co-morbidity symptoms are monitored and maintained or improved  Outcome: Progressing     Problem: Nutrition  Goal: Nutrient intake appropriate for maintaining nutritional needs  Outcome: Progressing

## 2025-07-01 NOTE — ANESTHESIA POSTPROCEDURE EVALUATION
Patient: Galina Chao    Procedure Summary       Date: 07/01/25 Room / Location: [facility] OR 08 / [facility]    Anesthesia Start: 0801 Anesthesia Stop: 1223    Procedure: HYSTERECTOMY, TOTAL, ROBOT-ASSISTED, LAPAROSCOPIC, UTEROSACRAL SUSPENSION, POSTERIOR REPAIR, SLING AND CYSTOSCOPY Diagnosis:       Uterovaginal prolapse, incomplete      LEATHA (stress urinary incontinence, female)      (Uterovaginal prolapse, incomplete [N81.2])      (LEATHA (stress urinary incontinence, female) [N39.3])    Surgeons: Alfonzo Gore MD Responsible Provider: eLle Penny MD    Anesthesia Type: general ASA Status: 2            Anesthesia Type: general    Vitals Value Taken Time   /64 07/01/25 13:45   Temp 36 °C (96.8 °F) 07/01/25 12:21   Pulse 62 07/01/25 13:46   Resp 9 07/01/25 13:46   SpO2 100 % 07/01/25 13:46   Vitals shown include unfiled device data.    Anesthesia Post Evaluation    Patient location during evaluation: PACU  Patient participation: complete - patient participated  Level of consciousness: awake and alert  Pain management: satisfactory to patient  Airway patency: patent  Cardiovascular status: acceptable  Respiratory status: acceptable  Hydration status: acceptable  Postoperative Nausea and Vomiting: none        No notable events documented.

## 2025-07-01 NOTE — ANESTHESIA PROCEDURE NOTES
Airway  Date/Time: 7/1/2025 8:08 AM  Reason: elective    Airway not difficult    Staffing  Performed: JOSE G   Authorized by: Lele Penny MD    Performed by: JOSE G Davis  Patient location during procedure: OR    Patient Condition  Indications for airway management: anesthesia  Patient position: sniffing  Planned trial extubation  Sedation level: deep     Final Airway Details   Preoxygenated: yes  Final airway type: endotracheal airway  Successful airway: ETT  Cuffed: yes   Successful intubation technique: direct laryngoscopy  Adjuncts used in placement: intubating stylet  Blade: Gricelda  Blade size: #3  ETT size (mm): 7.0  Cormack-Lehane Classification: grade I - full view of glottis  Placement verified by: chest auscultation and capnometry   Measured from: lips  ETT to lips (cm): 22  Number of attempts at approach: 1

## 2025-07-01 NOTE — OP NOTE
HYSTERECTOMY, TOTAL, ROBOT-ASSISTED, LAPAROSCOPIC, POSTERIOR REPAIR, SLING AND CYSTOSCOPY Operative Note     Date: 2025  OR Location: [facility] OR    Name: Galina Chao, : 1950, Age: 75 y.o., MRN: [id number], Sex: female    Diagnosis  Pre-op Diagnosis      * Uterovaginal prolapse, incomplete [N81.2]     * LEATHA (stress urinary incontinence, female) [N39.3] Post-op Diagnosis     * Uterovaginal prolapse, incomplete [N81.2]     * LEATHA (stress urinary incontinence, female) [N39.3]     Procedures  HYSTERECTOMY, TOTAL, ROBOT-ASSISTED, LAPAROSCOPIC, UTEROSACRAL SUSPENSION, POSTERIOR REPAIR, SLING AND CYSTOSCOPY  [id number] - WA ANTER COLPORRHAPHY,BLAD/VAGINA    HYSTERECTOMY, TOTAL, ROBOT-ASSISTED, LAPAROSCOPIC, UTEROSACRAL SUSPENSION, POSTERIOR REPAIR, SLING AND CYSTOSCOPY  98632 - WA POST COLPORRHAPHY RECTOCELE W/WO PERINEORRHAPHY    HYSTERECTOMY, TOTAL, ROBOT-ASSISTED, LAPAROSCOPIC, UTEROSACRAL SUSPENSION, POSTERIOR REPAIR, SLING AND CYSTOSCOPY  [id number] - WA SLING OPERATION STRESS INCONTINENCE      Surgeons      * Alfonzo Gore - Primary    Resident/Fellow/Other Assistant:  Surgeons and Role:     * Zandra Small MD - Resident - Assisting    Staff:   Jeannette: Tess Leon Person: Bobbi  Surgical Assistant: Ruth Ann  Circulator: Aleida    Anesthesia Staff: Anesthesiologist: Lele Penny MD  C-AA: JOSE G Davis    Procedure Summary  Anesthesia: General  ASA: II  Estimated Blood Loss: 15mL  Intra-op Medications:   Administrations occurring from 0845 to 1150 on 25:   Medication Name Total Dose   BUPivacaine HCl (Marcaine) 0.5 % (5 mg/mL) injection 24 mL   lidocaine-epinephrine (Xylocaine W/EPI) 1 %-1:100,000 injection 10 mL   fentaNYL (Sublimaze) injection 50 mcg/mL 50 mcg   glycopyrrolate (Robinul) injection 1 mg   HYDROmorphone (Dilaudid) injection 1 mg/mL 0.5 mg   neostigmine (Bloxiverz) 10 mg/10 mL injection 5 mg   ondansetron (Zofran) 2 mg/mL injection 4 mg   propofol (Diprivan) injection 10 mg/mL 20 mg    rocuronium (Zemuron) 50 mg/5 mL prefilled syringe 30 mg              Anesthesia Record               Intraprocedure I/O Totals          Intake    LR bolus 1700.00 mL    Total Intake 1700 mL       Output    Urine 400 mL    Est. Blood Loss 50 mL    Total Output 450 mL       Net    Net Volume 1250 mL          Specimen:   ID Type Source Tests Collected by Time   1 : UTERUS, CERVIX, RIGHT FALLOPIAN TUBE AND RIGHT OVARY Tissue UTERUS, CERVIX, FALLOPIAN TUBE AND OVARY RIGHT SURGICAL PATHOLOGY EXAM Alfonzo Gore MD 7/1/2025 1022                 Drains and/or Catheters:   Urethral Catheter Non-latex 16 Fr. (Active)   Present on Admission to Healthcare Facility N 07/01/25 1550   Site Assessment Clean;Skin intact 07/01/25 1550   Collection Container Standard drainage bag 07/01/25 1550   Securement Method Securing device (Describe) 07/01/25 1550   Reason for Continuing Urinary Catheterization surgical procedures: urological/gynecological, pelvic oncology, anal, prolonged surgical procedure 07/01/25 1550             Implants:  Implants       Type Name Action Serial No.      Implant SLING SYSTEM, MID URETHERAL, LYNX SUPRAPUBIC - [id number] Implanted               Findings: Extensive lysis of adhesions required after initial entry into abdomen.  Patient had history of left salpingo-oophorectomy so repair biotic portion included total hysterectomy with right sided salpingo-oophorectomy. Apical Small trocar injury from urethral sling portion of the case appreciated on cystoscopy (patient right trocar) however minimal hematuria noted and repass of trocar resulted in excellent placement, confirmed without entrance into the bladder.  Uterosacral suspension deferred as apex of vagina had favorable anatomy after hysterectomy. Posterior repair and perineoplasty also performed.    Indications: Galina Chao is an 75 y.o. female who is having surgery for Uterovaginal prolapse, incomplete [N81.2]  LEATHA (stress urinary incontinence,  female) [N39.3].     The patient was seen in the preoperative area. The risks, benefits, complications, treatment options, non-operative alternatives, expected recovery and outcomes were discussed with the patient. The possibilities of reaction to medication, pulmonary aspiration, injury to surrounding structures, bleeding, recurrent infection, the need for additional procedures, failure to diagnose a condition, and creating a complication requiring transfusion or operation were discussed with the patient. The patient concurred with the proposed plan, giving informed consent.  The site of surgery was properly noted/marked if necessary per policy. The patient has been actively warmed in preoperative area. Preoperative antibiotics have been ordered and given within 1 hours of incision. Venous thrombosis prophylaxis are not indicated.    Procedure Details:  After informed consent was obtained, the patient was taken to the operating room where satisfactory anesthesia was induced.  She was prepped and draped in the normal sterile fashion for procedure in the dorsal lithotomy position.  She was given DVT prophylaxis with SCDs as well as antibiotic prophylaxis. The patient's vagina was first inspected and a VCare uterine manipulator was placed in standard fashion.     Robotic Hysterectomy  Attention was then turned to the patient's abdomen.  A 12 mm vertical skin incision was made above the umbilicus and carried down to the underlying layer of fascia.  The fascia was elevated, incised and tagged with suture.  The peritoneum was then identified and entered bluntly.  The 10 mm Paulina trocar port was placed and we then insufflated the abdomen.  Robotic camera was inserted and moderate adhesions were appreciated.  Using combination of blunt dissection and laparoscopic scissors, all adhesions that were restricting case progression were taken down. Abdominal survey, uterus, right fallopian tubes, and ovary appeared normal aside  from lack of left fallopian tube and ovary;patient had history of left salpingo-oophorectomy.patient had minor  adhesions in the area of the previous left salpingo-oophorectomy are both attached to bowel and uterus.  These were taken down during the robotic hysterectomy.  No abdominal trauma was noted. Two additional robotic arms were placed on the right and left side of the umbilicus under direct visualization.  An assistant port was placed in the left upper quadrant under direct visualization using an 8 mm AirSeal port.  The patient was placed in steep Trendelenburg.    The robot was then docked in the usual fashion.  The robotic instruments were inserted including monopolar scissors on the patient's right side and a bipolar Maryland on the patient's left side.  We then turned our attention to the robot.  The uterus was placed on counter-tension. The right round ligament was cauterized and transected Maryland bipolar and scissors (monopolar). The right retroperitoneal space was opened with monopolar cautery parallel to the IP ligament.  The right uteroovarian ligament was cauterized several times and transected. The right fallopian tube was also cauterized at the level of the cornua and transected. The remaining peritoneal attachments were taken down from the ovary.  The left round ligament was cauterized several times and transected.  The left uteroovarian ligament was cauterized several times and transected.  The posterior leaf of the broad was taken down to the posterior aspect of the cervix bilaterally.  The anterior leaf of the broad was taken down to the uterocervical junction and the bladder flap was created and developed.    The uterine arteries were skeletonized and then cauterized and the pedicles were then dissected away from the cervix bilaterally.  A circumferential colpotomy was made and the uterus and cervix were removed through the vagina.  The colpotomy was then closed with a running stitch of 2-0  VLoc.  Copious irrigation was performed and there was noted to be good hemostasis.  Cystoscopy was performed in the usual fashion with an intact bladder and bilateral UO efflux observed.  All instruments were then removed and the robot was undocked.  Counts were correct.  The umbilical fascia was then repaired using 0-Vicryl, and the umbilical skin was repaired using interrupted stitches of 4-0 Monocryl. The remaining port sites were closed with skin glue.     Posterior Repair, Urethral Sling    The Lonestar retractor was placed around the perineum. The anterior vaginal wall was infiltrated with lidocaine 1% with epinephrine and a superficial incision starting approximately 1 cm inferior to the urethral meatus was carried out approximately 1.5 cm with a knife. From superficial to deep, the anterior vaginal wall was then dissected from the tissue surrounding the urethra until the pubic bone was reached. This procedure was repeated on the opposite side. Two small knife holes were then created in the mons pubis just cephalad and approximately 1.5 cm lateral to the pubic symphysis. The sling trocars were then inserted through the holes in the mons pubis and were brought out through the dissected space in the vagina bilaterally. A cystoscopy was performed and no defects were noted within the bladder wall. The mesh sling was then connected to the trocars and pulled through the dissected space. After the sling was tensioned appropriately using a Hegar dilator, the remainder of the mesh protruding from the mons pubis was cut, leaving just two puncture holes on the mons pubis. The anterior vaginal wall was then closed with a 2-0 Vicryl suture in a running, locked fashion. The Wild catheter was replaced. Dermabond was placed over the puncture holes located on the mons pubis.     The perineoplasty margins were then outlined and injected with 1% lidocaine with epinephrine. A superficial incision was made through the outline  with a knife. The perineal area was de-skinned using a knife. The posterior vaginal wall mucosa was then infiltrated with 1% lidocaine and was  from the rectum with metzenbaum scissors. Once the dissection was completed, the rectovaginal fascial tissue and then the perineal muscular tissue was plicated in the midline with a series of U stitches until the dead space was completely closed. The vaginal mucosa was then re-approximated with 2-0 Vicryl until the wound was completely closed. Vaginal packing was placed.    The patient was then awakened from anesthesia, having tolerated procedure well, and was taken to the recovery room in stable condition. All sponge, needle, and instrument counts were correct at the end the case.    Evidence of Infection: No   Complications:  None; patient tolerated the procedure well.    Disposition: PACU - hemodynamically stable.  Condition: stable     Task Performed by RNFA or Surgical Assistant:  Port placement, bedsiding, superficial wound closure    Additional Details:   Admit for obs  TOV in am with AM labs    Attending Attestation: I was present and scrubbed for the entire procedure.    Alfonzo Gore  Phone Number: [phone]

## 2025-07-02 ENCOUNTER — PHARMACY VISIT (OUTPATIENT)
Dept: PHARMACY | Facility: CLINIC | Age: 75
End: 2025-07-02
Payer: COMMERCIAL

## 2025-07-02 VITALS
BODY MASS INDEX: 28.93 KG/M2 | OXYGEN SATURATION: 95 % | SYSTOLIC BLOOD PRESSURE: 121 MMHG | HEIGHT: 66 IN | DIASTOLIC BLOOD PRESSURE: 69 MMHG | WEIGHT: 180 LBS | TEMPERATURE: 97.5 F | RESPIRATION RATE: 16 BRPM | HEART RATE: 94 BPM

## 2025-07-02 LAB
ANION GAP SERPL CALC-SCNC: 14 MMOL/L (ref 10–20)
BUN SERPL-MCNC: 12 MG/DL (ref 6–23)
CALCIUM SERPL-MCNC: 8.8 MG/DL (ref 8.6–10.3)
CHLORIDE SERPL-SCNC: 103 MMOL/L (ref 98–107)
CO2 SERPL-SCNC: 21 MMOL/L (ref 21–32)
CREAT SERPL-MCNC: 0.5 MG/DL (ref 0.5–1.05)
EGFRCR SERPLBLD CKD-EPI 2021: >90 ML/MIN/1.73M*2
ERYTHROCYTE [DISTWIDTH] IN BLOOD BY AUTOMATED COUNT: 12.9 % (ref 11.5–14.5)
GLUCOSE SERPL-MCNC: 97 MG/DL (ref 74–99)
HCT VFR BLD AUTO: 35.1 % (ref 36–46)
HGB BLD-MCNC: 10.9 G/DL (ref 12–16)
MCH RBC QN AUTO: 29.5 PG (ref 26–34)
MCHC RBC AUTO-ENTMCNC: 31.1 G/DL (ref 32–36)
MCV RBC AUTO: 95 FL (ref 80–100)
NRBC BLD-RTO: 0 /100 WBCS (ref 0–0)
PLATELET # BLD AUTO: 211 X10*3/UL (ref 150–450)
POTASSIUM SERPL-SCNC: 4.9 MMOL/L (ref 3.5–5.3)
RBC # BLD AUTO: 3.69 X10*6/UL (ref 4–5.2)
SODIUM SERPL-SCNC: 133 MMOL/L (ref 136–145)
WBC # BLD AUTO: 11.6 X10*3/UL (ref 4.4–11.3)

## 2025-07-02 PROCEDURE — 7100000011 HC EXTENDED STAY RECOVERY HOURLY - NURSING UNIT

## 2025-07-02 PROCEDURE — 2500000001 HC RX 250 WO HCPCS SELF ADMINISTERED DRUGS (ALT 637 FOR MEDICARE OP)

## 2025-07-02 PROCEDURE — 36415 COLL VENOUS BLD VENIPUNCTURE: CPT

## 2025-07-02 PROCEDURE — 2500000004 HC RX 250 GENERAL PHARMACY W/ HCPCS (ALT 636 FOR OP/ED)

## 2025-07-02 PROCEDURE — RXMED WILLOW AMBULATORY MEDICATION CHARGE

## 2025-07-02 PROCEDURE — 96372 THER/PROPH/DIAG INJ SC/IM: CPT

## 2025-07-02 PROCEDURE — 2500000002 HC RX 250 W HCPCS SELF ADMINISTERED DRUGS (ALT 637 FOR MEDICARE OP, ALT 636 FOR OP/ED)

## 2025-07-02 PROCEDURE — 80048 BASIC METABOLIC PNL TOTAL CA: CPT

## 2025-07-02 PROCEDURE — 85027 COMPLETE CBC AUTOMATED: CPT

## 2025-07-02 RX ORDER — OXYCODONE HYDROCHLORIDE 5 MG/1
5 TABLET ORAL EVERY 4 HOURS PRN
Qty: 10 TABLET | Refills: 0 | Status: SHIPPED | OUTPATIENT
Start: 2025-07-02 | End: 2025-07-04

## 2025-07-02 RX ORDER — POLYETHYLENE GLYCOL 3350 17 G/17G
17 POWDER, FOR SOLUTION ORAL DAILY
Qty: 3 PACKET | Refills: 0 | Status: SHIPPED | OUTPATIENT
Start: 2025-07-03 | End: 2025-07-06

## 2025-07-02 RX ADMIN — AMLODIPINE BESYLATE 5 MG: 5 TABLET ORAL at 07:59

## 2025-07-02 RX ADMIN — HYDROXYCHLOROQUINE SULFATE 400 MG: 200 TABLET, FILM COATED ORAL at 07:59

## 2025-07-02 RX ADMIN — SODIUM CHLORIDE, SODIUM LACTATE, POTASSIUM CHLORIDE, AND CALCIUM CHLORIDE 100 ML/HR: .6; .31; .03; .02 INJECTION, SOLUTION INTRAVENOUS at 12:39

## 2025-07-02 RX ADMIN — ACETAMINOPHEN 975 MG: 325 TABLET ORAL at 10:12

## 2025-07-02 RX ADMIN — POLYETHYLENE GLYCOL 3350 17 G: 17 POWDER, FOR SOLUTION ORAL at 08:01

## 2025-07-02 RX ADMIN — SODIUM CHLORIDE, SODIUM LACTATE, POTASSIUM CHLORIDE, AND CALCIUM CHLORIDE 100 ML/HR: .6; .31; .03; .02 INJECTION, SOLUTION INTRAVENOUS at 01:29

## 2025-07-02 RX ADMIN — OXYCODONE HYDROCHLORIDE 5 MG: 5 TABLET ORAL at 07:57

## 2025-07-02 RX ADMIN — ATENOLOL 25 MG: 25 TABLET ORAL at 07:59

## 2025-07-02 RX ADMIN — LEVOTHYROXINE SODIUM 137 MCG: 0.14 TABLET ORAL at 06:06

## 2025-07-02 RX ADMIN — ASPIRIN 81 MG: 81 TABLET, COATED ORAL at 07:59

## 2025-07-02 RX ADMIN — OXYCODONE HYDROCHLORIDE 5 MG: 5 TABLET ORAL at 12:38

## 2025-07-02 RX ADMIN — ATORVASTATIN CALCIUM 20 MG: 20 TABLET, FILM COATED ORAL at 07:59

## 2025-07-02 RX ADMIN — HEPARIN SODIUM 5000 UNITS: 5000 INJECTION, SOLUTION INTRAVENOUS; SUBCUTANEOUS at 06:06

## 2025-07-02 RX ADMIN — ACETAMINOPHEN 975 MG: 325 TABLET ORAL at 02:41

## 2025-07-02 ASSESSMENT — PAIN - FUNCTIONAL ASSESSMENT
PAIN_FUNCTIONAL_ASSESSMENT: 0-10

## 2025-07-02 ASSESSMENT — COGNITIVE AND FUNCTIONAL STATUS - GENERAL
WALKING IN HOSPITAL ROOM: A LITTLE
STANDING UP FROM CHAIR USING ARMS: A LITTLE
DAILY ACTIVITIY SCORE: 22
TURNING FROM BACK TO SIDE WHILE IN FLAT BAD: A LITTLE
TOILETING: A LITTLE
DRESSING REGULAR LOWER BODY CLOTHING: A LITTLE
MOVING TO AND FROM BED TO CHAIR: A LITTLE
CLIMB 3 TO 5 STEPS WITH RAILING: A LITTLE
MOVING FROM LYING ON BACK TO SITTING ON SIDE OF FLAT BED WITH BEDRAILS: A LITTLE
MOBILITY SCORE: 18

## 2025-07-02 ASSESSMENT — PAIN DESCRIPTION - LOCATION
LOCATION: ABDOMEN
LOCATION: ABDOMEN

## 2025-07-02 ASSESSMENT — PAIN SCALES - GENERAL
PAINLEVEL_OUTOF10: 3
PAINLEVEL_OUTOF10: 6
PAINLEVEL_OUTOF10: 3
PAINLEVEL_OUTOF10: 5 - MODERATE PAIN
PAINLEVEL_OUTOF10: 8

## 2025-07-02 ASSESSMENT — PAIN DESCRIPTION - DESCRIPTORS
DESCRIPTORS: ACHING
DESCRIPTORS: ACHING;SHARP
DESCRIPTORS: ACHING

## 2025-07-02 ASSESSMENT — PAIN DESCRIPTION - ORIENTATION: ORIENTATION: ANTERIOR;MID

## 2025-07-02 NOTE — PROGRESS NOTES
Spiritual Care Visit  Spiritual Care Request    Reason for Visit:  Routine Visit: Introduction     Request Received From:       Focus of Care:  Visited With: Patient         Refer to :          Spiritual Care Assessment    Spiritual Assessment:                      Care Provided:  Intended Effects: Build relationship of care and support, Preserve dignity and respect, Establish rapport and connectedness, Demonstrate caring and concern  Methods: Assist with spiritual/Rastafarian practices, Offer spiritual/Rastafarian support  Interventions: Active listening, Ask guided questions, Perform a Rastafarian rite or ritual, Share words of hope and inspiration    Sense of Community and or Confucianism Affiliation:  Adventist         Addressed Needs/Concerns and/or Annette Through:     Sacramental Encounters  Sacrament of Sick-Anointing: Anointed    Outcome:        Advance Directives:         Spiritual Care Annotation    Annotation:

## 2025-07-02 NOTE — CARE PLAN
The patient's goals for the shift include comfort/safety    The clinical goals for the shift include Patient will have adequate pain control.  Will increase in activity and urinate with out difficulty.    Problem: Pain - Adult  Goal: Verbalizes/displays adequate comfort level or baseline comfort level  Outcome: Progressing     Problem: Safety - Adult  Goal: Free from fall injury  Outcome: Progressing     Problem: Discharge Planning  Goal: Discharge to home or other facility with appropriate resources  Outcome: Progressing     Problem: Chronic Conditions and Co-morbidities  Goal: Patient's chronic conditions and co-morbidity symptoms are monitored and maintained or improved  Outcome: Progressing     Problem: Nutrition  Goal: Nutrient intake appropriate for maintaining nutritional needs  Outcome: Progressing     Problem: Skin  Goal: Decreased wound size/increased tissue granulation at next dressing change  Outcome: Progressing  Goal: Participates in plan/prevention/treatment measures  Outcome: Progressing  Goal: Prevent/manage excess moisture  Outcome: Progressing  Goal: Prevent/minimize sheer/friction injuries  Outcome: Progressing  Goal: Promote/optimize nutrition  Outcome: Progressing  Goal: Promote skin healing  Outcome: Progressing

## 2025-07-02 NOTE — DISCHARGE INSTRUCTIONS
CATHETER CARE  Always keep the catheters tubing and drainage bag below the level of your bladder.  Avoid loops and kinks in the catheter tubing.  NOTIFY your physician if catheter falls out or catheter seems clogged and urine is not draining.   Do not wear the small leg bag to bed you should be provided with a larger overnight bag that you should wear to bed and can hang over the side of the bed.  We recommend wearing the large bag in the shower as well as this is easy to dry, and you do not get your leg straps wet from your leg bag.   Your catheter should be secured to your upper thigh, do not allow it to hang or dangle.

## 2025-07-02 NOTE — PROGRESS NOTES
07/02/25 1009   Discharge Planning   Living Arrangements Spouse/significant other   Support Systems Spouse/significant other   Type of Residence Private residence   Home or Post Acute Services None   Expected Discharge Disposition Home     Met with patient at bedside. Admitted for uterovaginal prolapse repair. Pt lives with spouse and was independent PTA with no HHC or DME. PCP is Juvencio Gonzalez. Pt feels she is able to manage her health and understands her medications. Was able to drive and obtain meds. No financial concerns. Pt plans to return home with no new discharge needs. Family will provide transport home.

## 2025-07-02 NOTE — CARE PLAN
Problem: Pain - Adult  Goal: Verbalizes/displays adequate comfort level or baseline comfort level  Outcome: Progressing     The patient's goals for the shift include Pain control    The clinical goals for the shift include Pain control

## 2025-07-02 NOTE — DISCHARGE INSTR - APPOINTMENTS
Call for follow up appointment tomorrow to be seen with Dr Gore for void trial on Monday July 7,2025 for void trial.

## 2025-07-02 NOTE — SIGNIFICANT EVENT
Asked to assist with facilitating this patients discharge per Dr. Gore  He was in to see patient this morning.  I interviewed patient to insure she was steady with ambulation, Urinating normally without retention, and no signs of bleeding since her vaginal packing was removed and to be sure she understands her DC instructions.I was not involved in her care.

## 2025-07-07 ENCOUNTER — CLINICAL SUPPORT (OUTPATIENT)
Dept: UROLOGY | Facility: CLINIC | Age: 75
End: 2025-07-07
Payer: MEDICARE

## 2025-07-07 VITALS — RESPIRATION RATE: 16 BRPM | TEMPERATURE: 96.8 F

## 2025-07-07 DIAGNOSIS — N39.3 SUI (STRESS URINARY INCONTINENCE, FEMALE): ICD-10-CM

## 2025-07-07 DIAGNOSIS — N81.4 UTEROVAGINAL PROLAPSE: ICD-10-CM

## 2025-07-07 PROCEDURE — 51700 IRRIGATION OF BLADDER: CPT | Performed by: UROLOGY

## 2025-07-07 NOTE — PROGRESS NOTES
Presents for trial of void, S/P hysterectomy, sling placement and posterior repair.  Bladder filled with 250 ml sterile water and was able to void 200 ml without difficulty.  Urine pink tinged.  Increase fluids. RTC as scheduled for follow up.

## 2025-07-09 ENCOUNTER — APPOINTMENT (OUTPATIENT)
Age: 75
End: 2025-07-09
Payer: MEDICARE

## 2025-07-10 ENCOUNTER — TELEPHONE (OUTPATIENT)
Dept: CARDIOLOGY | Facility: CLINIC | Age: 75
End: 2025-07-10
Payer: MEDICARE

## 2025-07-10 DIAGNOSIS — M10.9 ACUTE GOUT, UNSPECIFIED CAUSE, UNSPECIFIED SITE: Primary | ICD-10-CM

## 2025-07-10 NOTE — TELEPHONE ENCOUNTER
Pt called stated can dr. Cruz can he reorder the Medrol dospak. She wasn't able to pick it up in time

## 2025-07-11 RX ORDER — METHYLPREDNISOLONE 4 MG/1
TABLET ORAL
Qty: 21 TABLET | Refills: 0 | Status: SHIPPED | OUTPATIENT
Start: 2025-07-11 | End: 2025-07-17

## 2025-07-11 NOTE — TELEPHONE ENCOUNTER
Patient reported swelling and pain to left foot/ankle. ? Gout and medrol dosepak ordered on 6/25- methylPREDNISolone (Medrol Dospak) 4 mg tablets.    Patient did not  medication and it was dc'd off her med list after her hysterectomy/cystoscopy on 7/1.

## 2025-07-14 LAB
LABORATORY COMMENT REPORT: NORMAL
PATH REPORT.FINAL DX SPEC: NORMAL
PATH REPORT.GROSS SPEC: NORMAL
PATH REPORT.RELEVANT HX SPEC: NORMAL
PATH REPORT.TOTAL CANCER: NORMAL

## 2025-07-17 ENCOUNTER — APPOINTMENT (OUTPATIENT)
Dept: UROLOGY | Facility: CLINIC | Age: 75
End: 2025-07-17
Payer: MEDICARE

## 2025-07-17 VITALS
HEART RATE: 79 BPM | HEIGHT: 66 IN | DIASTOLIC BLOOD PRESSURE: 71 MMHG | WEIGHT: 184 LBS | SYSTOLIC BLOOD PRESSURE: 127 MMHG | BODY MASS INDEX: 29.57 KG/M2 | TEMPERATURE: 98.7 F

## 2025-07-17 DIAGNOSIS — N39.3 SUI (STRESS URINARY INCONTINENCE, FEMALE): ICD-10-CM

## 2025-07-17 DIAGNOSIS — N30.00 ACUTE CYSTITIS WITHOUT HEMATURIA: ICD-10-CM

## 2025-07-17 DIAGNOSIS — R30.0 DYSURIA: ICD-10-CM

## 2025-07-17 DIAGNOSIS — N81.4 UTEROVAGINAL PROLAPSE: Primary | ICD-10-CM

## 2025-07-17 DIAGNOSIS — N76.0 ACUTE VAGINITIS: ICD-10-CM

## 2025-07-17 LAB
POC APPEARANCE, URINE: ABNORMAL
POC BILIRUBIN, URINE: NEGATIVE
POC BLOOD, URINE: ABNORMAL
POC COLOR, URINE: YELLOW
POC GLUCOSE, URINE: NEGATIVE MG/DL
POC KETONES, URINE: NEGATIVE MG/DL
POC LEUKOCYTES, URINE: ABNORMAL
POC NITRITE,URINE: POSITIVE
POC PH, URINE: 5.5 PH
POC PROTEIN, URINE: ABNORMAL MG/DL
POC SPECIFIC GRAVITY, URINE: 1.01
POC UROBILINOGEN, URINE: 1 EU/DL

## 2025-07-17 PROCEDURE — 3074F SYST BP LT 130 MM HG: CPT | Performed by: NURSE PRACTITIONER

## 2025-07-17 PROCEDURE — 3008F BODY MASS INDEX DOCD: CPT | Performed by: NURSE PRACTITIONER

## 2025-07-17 PROCEDURE — 81003 URINALYSIS AUTO W/O SCOPE: CPT | Performed by: NURSE PRACTITIONER

## 2025-07-17 PROCEDURE — 1159F MED LIST DOCD IN RCRD: CPT | Performed by: NURSE PRACTITIONER

## 2025-07-17 PROCEDURE — 51798 US URINE CAPACITY MEASURE: CPT | Performed by: NURSE PRACTITIONER

## 2025-07-17 PROCEDURE — 99024 POSTOP FOLLOW-UP VISIT: CPT | Performed by: NURSE PRACTITIONER

## 2025-07-17 PROCEDURE — 3078F DIAST BP <80 MM HG: CPT | Performed by: NURSE PRACTITIONER

## 2025-07-17 PROCEDURE — G2211 COMPLEX E/M VISIT ADD ON: HCPCS | Performed by: NURSE PRACTITIONER

## 2025-07-17 RX ORDER — NITROFURANTOIN 25; 75 MG/1; MG/1
100 CAPSULE ORAL 2 TIMES DAILY
Qty: 14 CAPSULE | Refills: 0 | Status: SHIPPED | OUTPATIENT
Start: 2025-07-17 | End: 2025-07-24

## 2025-07-17 ASSESSMENT — PATIENT HEALTH QUESTIONNAIRE - PHQ9
2. FEELING DOWN, DEPRESSED OR HOPELESS: NOT AT ALL
SUM OF ALL RESPONSES TO PHQ9 QUESTIONS 1 AND 2: 0
1. LITTLE INTEREST OR PLEASURE IN DOING THINGS: NOT AT ALL

## 2025-07-17 NOTE — DISCHARGE SUMMARY
Discharge Diagnosis  Uterovaginal prolapse    Issues Requiring Follow-Up  Follow-up in 2 weeks with the [facility] for postop check    Test Results Pending At Discharge  Pending Labs       No current pending labs.            Hospital Course   Patient underwent total robotic assisted laparoscopic hysterectomy with posterior paramedial sling on July 1, 2025.  Patient had a smooth postoperative course.  She passed trial of void.    Visit Vitals  /69 (BP Location: Right arm, Patient Position: Sitting)   Pulse 94   Temp 36.4 °C (97.5 °F) (Temporal)   Resp 16     Vitals:    07/01/25 0738   Weight: 81.6 kg (180 lb)       Immunization History   Administered Date(s) Administered    COVID-19, mRNA, LNP-S, PF, 30 mcg/0.3 mL dose 11/26/2021    Flu vaccine (IIV4), preservative free *Check age/dose* 09/12/2018    Flu vaccine, quadrivalent, high-dose, preservative free, age 65y+ (FLUZONE) 11/09/2022    Flu vaccine, trivalent, preservative free, HIGH-DOSE, age 65y+ (Fluzone) 09/28/2016, 10/09/2017, 09/27/2019    Influenza, Seasonal, Quadrivalent, Adjuvanted 10/05/2020, 09/21/2021    Influenza, Unspecified 10/13/2009, 10/14/2010, 08/15/2012, 10/09/2013, 09/15/2014    Influenza, seasonal, injectable 09/08/2011    Novel influenza-H1N1-09, preservative-free 11/15/2009    Pfizer Purple Cap SARS-CoV-2 02/15/2021, 03/08/2021    Pneumococcal conjugate vaccine, 13-valent (PREVNAR 13) 11/03/2017    Pneumococcal polysaccharide vaccine, 23-valent, age 2 years and older (PNEUMOVAX 23) 09/29/2011    Td vaccine, age 7 years and older (TDVAX) 07/20/2010, 05/06/2014    Tdap vaccine, age 7 year and older (BOOSTRIX, ADACEL) 11/15/2018    Zoster, live 01/19/2012       Results        Pertinent Physical Exam At Time of Discharge  Physical Exam  Constitutional:       Appearance: Normal appearance.     Cardiovascular:      Rate and Rhythm: Normal rate and regular rhythm.   Pulmonary:      Effort: Pulmonary effort is normal.      Breath sounds: Normal  breath sounds.     Neurological:      General: No focal deficit present.      Mental Status: She is alert and oriented to person, place, and time.     Psychiatric:         Mood and Affect: Mood normal.         Behavior: Behavior normal.         Home Medications     Medication List      CHANGE how you take these medications     levothyroxine 137 mcg tablet; Commonly known as: Synthroid, Levoxyl;   TAKE 1 TABLET BY MOUTH daily; What changed: how much to take, how to take   this, when to take this     CONTINUE taking these medications     albuterol 90 mcg/actuation inhaler   amLODIPine 5 mg tablet; Commonly known as: Norvasc; Take 1 tablet (5 mg)   by mouth once daily.   aspirin 81 mg EC tablet   atenolol 25 mg tablet; Commonly known as: Tenormin; TAKE 1 TABLET BY   MOUTH ONCE  DAILY   atorvastatin 20 mg tablet; Commonly known as: Lipitor; TAKE 1 TABLET BY   MOUTH DAILY   biotin 10 mg tablet   CALCIUM 600 ORAL   cholecalciferol 50 mcg (2,000 units) capsule; Commonly known as: Vitamin   D-3   eplerenone 25 mg tablet; Commonly known as: Inspra; Take 1 tablet (25   mg) by mouth once daily.   estradiol 0.01 % (0.1 mg/gram) vaginal cream; Commonly known as: Estrace   gabapentin 300 mg capsule; Commonly known as: Neurontin; TAKE 1 CAPSULE   BY MOUTH TWICE  DAILY   hydroxychloroquine 200 mg tablet; Commonly known as: Plaquenil   Incruse Ellipta 62.5 mcg/actuation inhalation; Generic drug:   umeclidinium; Inhale 1 puff (62.5 mcg) once daily.   lansoprazole 30 mg DR capsule; Commonly known as: Prevacid; TAKE 1   CAPSULE BY MOUTH IN THE  MORNING   methocarbamol 500 mg tablet; Commonly known as: Robaxin   minoxidil 2.5 mg tablet; Commonly known as: Loniten; Take 1 tablet (2.5   mg) by mouth once daily.   Multivitamin 50 Plus; Generic drug: multivitamin with minerals iron-free   * nortriptyline 25 mg capsule; Commonly known as: Pamelor; Take 2   capsules (50 mg) by mouth once daily.   * nortriptyline 25 mg capsule; Commonly known  as: Pamelor; TAKE 2   CAPSULES BY MOUTH ONCE  DAILY   zinc gluconate 50 mg elemental tablet   zolpidem 10 mg tablet; Commonly known as: Ambien; Take 1 tablet (10 mg)   by mouth once daily at bedtime.  * This list has 2 medication(s) that are the same as other medications   prescribed for you. Read the directions carefully, and ask your doctor or   other care provider to review them with you.     STOP taking these medications     chlorhexidine 0.12 % solution; Commonly known as: Peridex   diazePAM 5 mg tablet; Commonly known as: Valium   HYDROcodone-acetaminophen 5-325 mg tablet; Commonly known as: Norco   methylPREDNISolone 4 mg tablets; Commonly known as: Medrol Dospak     ASK your doctor about these medications     oxyCODONE 5 mg immediate release tablet; Commonly known as: Roxicodone;   Take 1 tablet (5 mg) by mouth every 4 hours if needed for moderate pain (4   - 6) for up to 2 days.; Ask about: Should I take this medication?   polyethylene glycol 17 gram packet; Commonly known as: Glycolax,   Miralax; Take 1 packet (17 g) mixed in 4 to 6 ounces of water or other   beverage by mouth once daily for 3 days.; Ask about: Should I take this   medication?       Outpatient Follow-Up  Future Appointments   Date Time Provider Department Center   11/3/2025  2:30 PM Kameron Cruz MD [id number] [place]   11/4/2025  1:00 PM DO Raheel [id number] [place]   5/21/2026  1:45 PM Kameron Cruz MD [id number] [place]       Alfonzo Gore MD

## 2025-07-17 NOTE — PROGRESS NOTES
07/17/25   [id number]    POV     Subjective      HPI Galina Chao is a 75 y.o. female who presents for post op visit, status post hysterectomy, total, robotic assisted laparoscopic uterosacral suspension posterior repair, sling and cystoscopy with Dr. Gore on 7/1/25;     HPI today 7/17/25  Symptoms of UTI, pressure, f  eels like UTI w urgency/hesitancy  urine c/w UTI today  Some foul vaginal odor, not noted on exam today  No vaginal bleeding  Struggles with constipation vs diarrhea  Discussed miralax titrated and psyllium fiber (metamucil)  PVR 15 ml today    PMH, PSH, FH, SH reviewed    Objective     LMP 09/30/2023    Physical Exam  General: Appears comfortable and in no apparent distress, well nourished  Head: Normocephalic, atraumatic  Neck: trachea midline  Respiratory: respirations unlabored, no wheezes, and no use of accessory muscles  Cardiovascular: at rest no dyspnea, well perfused  Skin: no visible rashes or lesions; incisions all healing well, no sign of infection; well approximated;  Neurologic: grossly intact, oriented to person, place, and time  Psychiatric: mood and affect appropriate  Musculoskeletal: in chair for appt. no difficulty w upper body movement    : stitches palpated and visualized with speculum exam intact; palpated small possible hematoma right anterior vaginal wall; no abnormal odor or discharge, no bleeding; no overcorrection urethra; no leakage on exam; thin clear discharge, sent for culture for possible BV;       Assessment/Plan   Problem List Items Addressed This Visit          Genitourinary and Reproductive    Uterovaginal prolapse - Primary    LEATHA (stress urinary incontinence, female)     No orders of the defined types were placed in this encounter.     Plan:   Call if unable to urinate or symptoms of UTI, go to ER if weekend or evening  Call if fever, chills, nausea or vomiting  Discussed constipation prevention, MiraLAX or Colace  Follow up as scheduled  3 mos  Pelvic rest and  no lifting over 10 lbs for 6-8 weeks  Macrobid Rx sent in empirically  call Mouna at [phone] sooner if any concerns on nonemergency nurse line     Follow up Dr. Gore at [facility], patient calling to make appt. Included Dr. Gore on notes, possible anterior wall small hematoma, no painful, discussed likely time will correct as blood dissipates; non-tender; no bleeding and sutures intact;   Muona Altamirano, APRN-CNP  Lab Results   Component Value Date    GLUCOSE 97 07/02/2025    CALCIUM 8.8 07/02/2025     (L) 07/02/2025    K 4.9 07/02/2025    CO2 21 07/02/2025     07/02/2025    BUN 12 07/02/2025    CREATININE 0.50 07/02/2025

## 2025-07-17 NOTE — PATIENT INSTRUCTIONS
Plan:   Call if unable to urinate or symptoms of UTI, go to ER if weekend or evening  Call if fever, chills, nausea or vomiting  Discussed constipation prevention, MiraLAX or Colace  Follow up as scheduled  3 mos  Pelvic rest and no lifting over 10 lbs for 6-8 weeks  Macrobid Rx sent in empirically  Hand out on UTI Prevention Dmannose discussed, psyllium fiber (metamucil)   call Mouna at [phone] sooner if any concerns on nonemergency nurse line    Follow up Dr. Gore at [facility] [phone] to make appt please

## 2025-07-18 ENCOUNTER — RESULTS FOLLOW-UP (OUTPATIENT)
Dept: UROLOGY | Facility: CLINIC | Age: 75
End: 2025-07-18
Payer: MEDICARE

## 2025-07-18 LAB — BV SCORE VAG QL: NORMAL

## 2025-07-20 LAB — BACTERIA UR CULT: ABNORMAL

## 2025-07-21 DIAGNOSIS — N30.00 ACUTE CYSTITIS WITHOUT HEMATURIA: Primary | ICD-10-CM

## 2025-07-21 RX ORDER — AMOXICILLIN AND CLAVULANATE POTASSIUM 875; 125 MG/1; MG/1
1 TABLET, FILM COATED ORAL 2 TIMES DAILY
Qty: 14 TABLET | Refills: 0 | Status: SHIPPED | OUTPATIENT
Start: 2025-07-21 | End: 2025-08-01 | Stop reason: SDUPTHER

## 2025-07-21 NOTE — TELEPHONE ENCOUNTER
----- Message from Mouna Altamirano sent at 7/21/2025  4:35 PM EDT -----  Augmentin Rx sent in, nitrofurantoin resistant  ----- Message -----  From: TERESO Guzman  Sent: 7/17/2025  11:32 AM EDT  To: LAKESHA Zarate-CNP

## 2025-07-31 ENCOUNTER — APPOINTMENT (OUTPATIENT)
Dept: UROLOGY | Facility: CLINIC | Age: 75
End: 2025-07-31
Payer: MEDICARE

## 2025-08-01 ENCOUNTER — TELEPHONE (OUTPATIENT)
Dept: UROLOGY | Facility: CLINIC | Age: 75
End: 2025-08-01
Payer: MEDICARE

## 2025-08-01 DIAGNOSIS — N30.00 ACUTE CYSTITIS WITHOUT HEMATURIA: ICD-10-CM

## 2025-08-01 RX ORDER — AMOXICILLIN AND CLAVULANATE POTASSIUM 875; 125 MG/1; MG/1
1 TABLET, FILM COATED ORAL 2 TIMES DAILY
Qty: 14 TABLET | Refills: 0 | Status: SHIPPED | OUTPATIENT
Start: 2025-08-01 | End: 2025-08-08

## 2025-08-01 NOTE — TELEPHONE ENCOUNTER
Pt left message stating she has had a crazy week and misplaced her Augmentin rx and is asking if another course can be sent to her local [facility] pharmacy. Please advise as she is still symptomatic, thanks.

## 2025-08-07 DIAGNOSIS — M54.50 CHRONIC BILATERAL LOW BACK PAIN WITHOUT SCIATICA: ICD-10-CM

## 2025-08-07 DIAGNOSIS — G89.29 CHRONIC BILATERAL LOW BACK PAIN WITHOUT SCIATICA: ICD-10-CM

## 2025-08-08 RX ORDER — HYDROCODONE BITARTRATE AND ACETAMINOPHEN 5; 325 MG/1; MG/1
1 TABLET ORAL 3 TIMES DAILY PRN
Qty: 30 TABLET | Refills: 0 | Status: SHIPPED | OUTPATIENT
Start: 2025-08-08

## 2025-08-14 DIAGNOSIS — R30.0 DYSURIA: ICD-10-CM

## 2025-08-28 ENCOUNTER — APPOINTMENT (OUTPATIENT)
Dept: UROLOGY | Facility: CLINIC | Age: 75
End: 2025-08-28
Payer: MEDICARE

## 2025-08-28 VITALS
BODY MASS INDEX: 28.72 KG/M2 | SYSTOLIC BLOOD PRESSURE: 166 MMHG | WEIGHT: 183 LBS | DIASTOLIC BLOOD PRESSURE: 75 MMHG | HEART RATE: 79 BPM | TEMPERATURE: 98.1 F | HEIGHT: 67 IN

## 2025-08-28 DIAGNOSIS — N95.8 GENITOURINARY SYNDROME OF MENOPAUSE: ICD-10-CM

## 2025-08-28 DIAGNOSIS — L90.0 LICHEN SCLEROSUS: ICD-10-CM

## 2025-08-28 DIAGNOSIS — N81.4 UTEROVAGINAL PROLAPSE: Primary | ICD-10-CM

## 2025-08-28 DIAGNOSIS — L43.9 LICHEN PLANUS: ICD-10-CM

## 2025-08-28 PROCEDURE — 1159F MED LIST DOCD IN RCRD: CPT | Performed by: NURSE PRACTITIONER

## 2025-08-28 PROCEDURE — 99024 POSTOP FOLLOW-UP VISIT: CPT | Performed by: NURSE PRACTITIONER

## 2025-08-28 PROCEDURE — 3077F SYST BP >= 140 MM HG: CPT | Performed by: NURSE PRACTITIONER

## 2025-08-28 PROCEDURE — 3078F DIAST BP <80 MM HG: CPT | Performed by: NURSE PRACTITIONER

## 2025-08-28 PROCEDURE — 3008F BODY MASS INDEX DOCD: CPT | Performed by: NURSE PRACTITIONER

## 2025-08-28 RX ORDER — ESTRADIOL 0.1 MG/G
CREAM VAGINAL
Qty: 42.5 G | Refills: 5 | Status: SHIPPED | OUTPATIENT
Start: 2025-08-28 | End: 2025-08-28

## 2025-08-28 RX ORDER — ESTRADIOL 0.1 MG/G
CREAM VAGINAL
Qty: 42.5 G | Refills: 5 | Status: SHIPPED | OUTPATIENT
Start: 2025-08-28 | End: 2026-08-28

## 2025-08-28 RX ORDER — CLOBETASOL PROPIONATE 0.5 MG/G
OINTMENT TOPICAL
Qty: 60 G | Refills: 3 | Status: SHIPPED | OUTPATIENT
Start: 2025-08-28 | End: 2025-08-28

## 2025-08-28 RX ORDER — CLOBETASOL PROPIONATE 0.5 MG/G
OINTMENT TOPICAL
Qty: 60 G | Refills: 3 | Status: SHIPPED | OUTPATIENT
Start: 2025-08-28

## 2025-08-28 ASSESSMENT — ENCOUNTER SYMPTOMS: DEPRESSION: 0

## 2025-11-04 ENCOUNTER — APPOINTMENT (OUTPATIENT)
Dept: PRIMARY CARE | Facility: CLINIC | Age: 75
End: 2025-11-04
Payer: MEDICARE

## (undated) DEVICE — COVER, TIP HOT SHEARS ENDOWRIST

## (undated) DEVICE — APPLICATOR, CHLORAPREP, W/ORANGE TINT, 26ML

## (undated) DEVICE — SOLUTION, IRRIGATION, STERILE WATER, 1000 ML, POUR BOTTLE

## (undated) DEVICE — DRIVER, NEEDLE LARGE, DAVINCI XI

## (undated) DEVICE — SUTURE, MONOCRYL, 4-0, 27 IN, PS-2, UNDYED

## (undated) DEVICE — SOLUTION, IRRIGATION, SODIUM CHLORIDE 0.9%, 1000 ML, POUR BOTTLE

## (undated) DEVICE — STAY SET, SURGICAL , 5MM SHARP HOOK, CS/ 50

## (undated) DEVICE — CARE KIT, LAPAROSCOPIC, ADVANCED

## (undated) DEVICE — SYRINGE, 60 CC, IRRIGATION, BULB, CONTRO-BULB, PAPER POUCH

## (undated) DEVICE — DRAPE PACK, LAVH, W/ATTACHED LEGGINGS, W/POUCH, 100 X 114 IN, LF, STERILE

## (undated) DEVICE — ASSEMBLY, STRYKER FLOW 2, SUCTION IRRIGATOR, WITH TIP

## (undated) DEVICE — NEEDLE, INSUFFLATION, 13GAX120MM, DISP

## (undated) DEVICE — OBTURATOR, BLADELESS , SU

## (undated) DEVICE — ACCESS PORT, 8M M, LOW PROFILE W/BLADELESS OPTICAL TIP, 100MM LENGTH

## (undated) DEVICE — DRAPE, INSTRUMENT, W/POUCH, STERI DRAPE, 9 5/8 X 18 LONG

## (undated) DEVICE — TRAY, SURESTEP, SILICONE DRAINAGE BAG, STATLOCK, 16FR

## (undated) DEVICE — CLIP, LIGATING, HEM-O-LOCK, MEDIUM/LARGE, LF, GREEN

## (undated) DEVICE — DRAPE, UNDERBUTTOCKS

## (undated) DEVICE — SCISSORS, METZ, CURVED, 3/4 BLADE

## (undated) DEVICE — RETRACTOR, SURGICAL, RING, PLASTIC, DISPOSABLE

## (undated) DEVICE — DRAPE, ARM XI

## (undated) DEVICE — Device

## (undated) DEVICE — SYRINGE, 60 CC, IRRIGATION, PISTON, CATH TIP, W/LUER ADAPTER,DISP

## (undated) DEVICE — SUTURE, STRATAFIX, SPIRAL PDS PLUS, 0, 9IN, 36CM, CT-1, VIOLET

## (undated) DEVICE — FORCEPS, BIPOLAR MARYLAND, DAVINCI XI

## (undated) DEVICE — SEAL, UNIVERSAL, 5-12MM

## (undated) DEVICE — DRAPE, COLUMN, DAVINCI XI

## (undated) DEVICE — SCISSORS, MONOPOLAR, CURVED, 8MM

## (undated) DEVICE — TOWEL PACK, STERILE, 4/PACK, BLUE

## (undated) DEVICE — GRASPER, FENESTRATED TIP-UP XI

## (undated) DEVICE — ADHESIVE, SKIN, DERMABOND ADVANCED, 15CM, PEN-STYLE

## (undated) DEVICE — TUBING, SUCTION, CONNECTING, 9/32 X 10FT, LF

## (undated) DEVICE — SOLUTION, IRRIGATION, USP, SODIUM CHLORIDE 0.9%, 3000 ML, BAG

## (undated) DEVICE — GOWN, ASTOUND, XL

## (undated) DEVICE — BRIDGE, CYSTO DBL CHANNEL

## (undated) DEVICE — TIP, SUCTION, YANKAUER, FLEXIBLE

## (undated) DEVICE — DRESSING, NON-ADHERENT, TELFA, OUCHLESS, 3 X 8 IN, STERILE

## (undated) DEVICE — COUNTER, NEEDLE, FOAM BLOCK, POP-N-COUNT, W/BLADEGUARD, W/ADHESIVE 40 COUNT, RED

## (undated) DEVICE — SUTURE, PDS II, 0, 27 IN, CT-2, VIOLET

## (undated) DEVICE — GLOVE, SURGICAL, PROTEXIS PI , 7.5, PF, LF

## (undated) DEVICE — PACKING, VAGINAL, XRAY DETECTABLE, 2IN X 3YD, STERILE

## (undated) DEVICE — TUBING SET, TRI-LUMEN, FILTERED, F/AIRSEAL

## (undated) DEVICE — SUTURE, VICRYL, 0, 27 IN, UR-6, VIOLET

## (undated) DEVICE — SUTURE, CTD, VICRYL, 2-0, UND, BR, CT-2

## (undated) DEVICE — POSITIONING, THE PINK PAD, PIGAZZI SYSTEM

## (undated) DEVICE — SUTURE, VICRYL PLUS, 0, 27IN, CT-2, UND, BRAIDED

## (undated) DEVICE — RETRACTOR, CERVICAL CUP, VCARE, STANDARD

## (undated) DEVICE — DRAPE, SHEET, THREE QUARTER, FAN FOLD, 57 X 77 IN